# Patient Record
Sex: FEMALE | Race: WHITE | Employment: UNEMPLOYED | URBAN - METROPOLITAN AREA
[De-identification: names, ages, dates, MRNs, and addresses within clinical notes are randomized per-mention and may not be internally consistent; named-entity substitution may affect disease eponyms.]

---

## 2017-02-02 PROBLEM — F31.81 BIPOLAR 2 DISORDER (HCC): Status: ACTIVE | Noted: 2017-02-02

## 2017-02-02 PROBLEM — F11.23 OPIOID DEPENDENCE WITH WITHDRAWAL (HCC): Status: ACTIVE | Noted: 2017-02-02

## 2017-02-02 PROBLEM — F32.A DEPRESSION: Status: ACTIVE | Noted: 2017-02-02

## 2017-02-02 PROBLEM — F14.10 COCAINE ABUSE (HCC): Status: ACTIVE | Noted: 2017-02-02

## 2017-09-11 LAB — HIV AG/AB: NORMAL

## 2018-05-01 ENCOUNTER — OFFICE VISIT (OUTPATIENT)
Dept: FAMILY MEDICINE CLINIC | Age: 23
End: 2018-05-01

## 2018-05-01 VITALS
SYSTOLIC BLOOD PRESSURE: 116 MMHG | HEIGHT: 61 IN | DIASTOLIC BLOOD PRESSURE: 74 MMHG | HEART RATE: 85 BPM | WEIGHT: 201.8 LBS | OXYGEN SATURATION: 98 % | BODY MASS INDEX: 38.1 KG/M2 | RESPIRATION RATE: 14 BRPM

## 2018-05-01 DIAGNOSIS — M54.50 ACUTE MIDLINE LOW BACK PAIN WITHOUT SCIATICA: Primary | ICD-10-CM

## 2018-05-01 PROCEDURE — G8417 CALC BMI ABV UP PARAM F/U: HCPCS | Performed by: FAMILY MEDICINE

## 2018-05-01 PROCEDURE — 1036F TOBACCO NON-USER: CPT | Performed by: FAMILY MEDICINE

## 2018-05-01 PROCEDURE — G8427 DOCREV CUR MEDS BY ELIG CLIN: HCPCS | Performed by: FAMILY MEDICINE

## 2018-05-01 PROCEDURE — 99203 OFFICE O/P NEW LOW 30 MIN: CPT | Performed by: FAMILY MEDICINE

## 2018-05-01 RX ORDER — NORETHINDRONE ACETATE AND ETHINYL ESTRADIOL 1MG-20(21)
1 KIT ORAL
COMMUNITY
Start: 2018-03-26 | End: 2018-11-09

## 2018-05-01 RX ORDER — IBUPROFEN 800 MG/1
800 TABLET ORAL
COMMUNITY
Start: 2018-02-27 | End: 2021-07-19 | Stop reason: ALTCHOICE

## 2018-05-01 RX ORDER — ACYCLOVIR 400 MG/1
400 TABLET ORAL
COMMUNITY
Start: 2018-02-13

## 2018-05-01 RX ORDER — NAPROXEN 500 MG/1
500 TABLET ORAL 2 TIMES DAILY WITH MEALS
COMMUNITY
End: 2018-11-09

## 2018-05-01 ASSESSMENT — PATIENT HEALTH QUESTIONNAIRE - PHQ9
SUM OF ALL RESPONSES TO PHQ QUESTIONS 1-9: 0
SUM OF ALL RESPONSES TO PHQ9 QUESTIONS 1 & 2: 0
1. LITTLE INTEREST OR PLEASURE IN DOING THINGS: 0
2. FEELING DOWN, DEPRESSED OR HOPELESS: 0

## 2018-05-01 ASSESSMENT — ENCOUNTER SYMPTOMS
GASTROINTESTINAL NEGATIVE: 1
EYES NEGATIVE: 1
ALLERGIC/IMMUNOLOGIC NEGATIVE: 1
RESPIRATORY NEGATIVE: 1

## 2018-11-09 ENCOUNTER — OFFICE VISIT (OUTPATIENT)
Dept: FAMILY MEDICINE CLINIC | Age: 23
End: 2018-11-09
Payer: MEDICAID

## 2018-11-09 VITALS
WEIGHT: 205 LBS | HEART RATE: 85 BPM | BODY MASS INDEX: 39.38 KG/M2 | OXYGEN SATURATION: 98 % | RESPIRATION RATE: 14 BRPM | DIASTOLIC BLOOD PRESSURE: 84 MMHG | SYSTOLIC BLOOD PRESSURE: 110 MMHG

## 2018-11-09 DIAGNOSIS — R53.83 FATIGUE, UNSPECIFIED TYPE: Primary | ICD-10-CM

## 2018-11-09 DIAGNOSIS — Z23 NEED FOR INFLUENZA VACCINATION: ICD-10-CM

## 2018-11-09 DIAGNOSIS — R05.9 COUGH: ICD-10-CM

## 2018-11-09 PROCEDURE — 1036F TOBACCO NON-USER: CPT | Performed by: FAMILY MEDICINE

## 2018-11-09 PROCEDURE — 90686 IIV4 VACC NO PRSV 0.5 ML IM: CPT | Performed by: FAMILY MEDICINE

## 2018-11-09 PROCEDURE — G8417 CALC BMI ABV UP PARAM F/U: HCPCS | Performed by: FAMILY MEDICINE

## 2018-11-09 PROCEDURE — 90471 IMMUNIZATION ADMIN: CPT | Performed by: FAMILY MEDICINE

## 2018-11-09 PROCEDURE — G8482 FLU IMMUNIZE ORDER/ADMIN: HCPCS | Performed by: FAMILY MEDICINE

## 2018-11-09 PROCEDURE — G8427 DOCREV CUR MEDS BY ELIG CLIN: HCPCS | Performed by: FAMILY MEDICINE

## 2018-11-09 PROCEDURE — 99214 OFFICE O/P EST MOD 30 MIN: CPT | Performed by: FAMILY MEDICINE

## 2018-11-09 RX ORDER — ALBUTEROL SULFATE 90 UG/1
2 AEROSOL, METERED RESPIRATORY (INHALATION) EVERY 6 HOURS PRN
Qty: 1 INHALER | Refills: 0 | Status: SHIPPED | OUTPATIENT
Start: 2018-11-09 | End: 2021-03-29

## 2018-11-09 ASSESSMENT — ENCOUNTER SYMPTOMS
CHEST TIGHTNESS: 0
CHOKING: 0
EYES NEGATIVE: 1
APNEA: 0
SHORTNESS OF BREATH: 1
GASTROINTESTINAL NEGATIVE: 1
COUGH: 1

## 2018-11-09 NOTE — PROGRESS NOTES
SUBJECTIVE:  Patient ID: Vito Lopez is a 20 y.o. y.o. female     HPI   Fatigue: Patient complains of fatigue. Symptoms began several weeks ago. Sentinal symptom the patient feels fatigue began with: none. Symptoms of her fatigue have been diffuse soft tissue aches and pains and general malaise. Patient describes the following psychologic symptoms: none. Patient denies fever, significant change in weight, symptoms of arthritis and exercise intolerance. Symptoms have been intermittent. Severity has been mild. Previous visits for this problem: none. Cough: Patient complains of nonproductive cough. Symptoms began 1 month ago. The cough is non-productive, without wheezing, dyspnea or hemoptysis and is aggravated by exercise Associated symptoms include:shortness of breath. Patient does not have new pets. Patient does not have a history of asthma. Patient does not have a history of environmental allergens. Patient does not have recent travel. Patient does not have a history of smoking. Patient  does not have previous Chest X-ray. Patient does not have had a PPD done.   Concern about occasional easy bruising is not sure were why she had that in the past sometimes she remembers she had her arm or leg but sometimes she can't remember    Past Medical History:   Diagnosis Date    Anxiety     [facility]     Borderline personality disorder ([facility]     Depression     H/O drug abuse     hx of heroin/cocaine    IBS (irritable bowel syndrome)     Obese       Past Surgical History:   Procedure Laterality Date    WISDOM TOOTH EXTRACTION       Family History   Problem Relation Age of Onset    Other Maternal Grandmother         demetia    Heart Disease Maternal Grandmother     High Cholesterol Maternal Grandfather     Diabetes Maternal Grandfather     High Blood Pressure Maternal Grandfather     Heart Disease Maternal Grandfather      Social History     Social History    Marital status:

## 2018-11-09 NOTE — PROGRESS NOTES
Vaccine Information Sheet, \"Influenza - Inactivated\"  given to Dami Lancaster, or parent/legal guardian of  Dmai Lancaster and verbalized understanding. Patient responses:    Have you ever had a reaction to a flu vaccine? No  Are you able to eat eggs without adverse effects? Yes  Do you have any current illness? No  Have you ever had Guillian Chestnut Syndrome? No    Flu vaccine given per order. Please see immunization tab.

## 2018-11-09 NOTE — PATIENT INSTRUCTIONS
energy. You may feel fatigue because of too much or not enough activity. It can also come from stress, lack of sleep, boredom, and poor diet. Many medical problems, such as viral infections, can cause fatigue. Emotional problems, especially depression, are often the cause of fatigue. Fatigue is most often a symptom of another problem. Treatment for fatigue depends on the cause. For example, if you have fatigue because you have a certain health problem, treating this problem also treats your fatigue. If depression or anxiety is the cause, treatment may help. Follow-up care is a key part of your treatment and safety. Be sure to make and go to all appointments, and call your doctor if you are having problems. It's also a good idea to know your test results and keep a list of the medicines you take. How can you care for yourself at home? · Get regular exercise. But don't overdo it. Go back and forth between rest and exercise. · Get plenty of rest.  · Eat a healthy diet. Do not skip meals, especially breakfast.  · Reduce your use of caffeine, tobacco, and alcohol. Caffeine is most often found in coffee, tea, cola drinks, and chocolate. · Limit medicines that can cause fatigue. This includes tranquilizers and cold and allergy medicines. When should you call for help? Watch closely for changes in your health, and be sure to contact your doctor if:    · You have new symptoms such as fever or a rash.     · Your fatigue gets worse.     · You have been feeling down, depressed, or hopeless. Or you may have lost interest in things that you usually enjoy.     · You are not getting better as expected. Where can you learn more? Go to [email or web address] and sign in to your "[employer] account. Enter Q326 in the [employer] box to learn more about \"Fatigue: Care Instructions. \"     If you do not have an account, please click on the \"Sign Up Now\" link.   Current as of: November 20, 2017  Content

## 2018-11-16 DIAGNOSIS — R53.83 FATIGUE, UNSPECIFIED TYPE: ICD-10-CM

## 2018-11-16 LAB
HCT VFR BLD CALC: 38.9 % (ref 36–48)
HEMOGLOBIN: 12.7 G/DL (ref 12–16)
MCH RBC QN AUTO: 28.9 PG (ref 26–34)
MCHC RBC AUTO-ENTMCNC: 32.6 G/DL (ref 31–36)
MCV RBC AUTO: 88.7 FL (ref 80–100)
PDW BLD-RTO: 14.3 % (ref 12.4–15.4)
PLATELET # BLD: 332 K/UL (ref 135–450)
PMV BLD AUTO: 9.4 FL (ref 5–10.5)
RBC # BLD: 4.39 M/UL (ref 4–5.2)
WBC # BLD: 8.1 K/UL (ref 4–11)

## 2018-11-17 LAB
ALBUMIN SERPL-MCNC: 4.7 G/DL (ref 3.4–5)
ALP BLD-CCNC: 103 U/L (ref 40–129)
ALT SERPL-CCNC: 30 U/L (ref 10–40)
ANION GAP SERPL CALCULATED.3IONS-SCNC: 14 MMOL/L (ref 3–16)
AST SERPL-CCNC: 21 U/L (ref 15–37)
BILIRUB SERPL-MCNC: 0.4 MG/DL (ref 0–1)
BILIRUBIN DIRECT: <0.2 MG/DL (ref 0–0.3)
BILIRUBIN, INDIRECT: NORMAL MG/DL (ref 0–1)
BUN BLDV-MCNC: 12 MG/DL (ref 7–20)
CALCIUM SERPL-MCNC: 9.7 MG/DL (ref 8.3–10.6)
CHLORIDE BLD-SCNC: 101 MMOL/L (ref 99–110)
CHOLESTEROL, TOTAL: 164 MG/DL (ref 0–199)
CO2: 23 MMOL/L (ref 21–32)
CREAT SERPL-MCNC: 0.5 MG/DL (ref 0.6–1.1)
FOLATE: 13.96 NG/ML (ref 4.78–24.2)
GFR AFRICAN AMERICAN: >60
GFR NON-AFRICAN AMERICAN: >60
GLUCOSE BLD-MCNC: 90 MG/DL (ref 70–99)
HDLC SERPL-MCNC: 52 MG/DL (ref 40–60)
LDL CHOLESTEROL CALCULATED: 99 MG/DL
POTASSIUM SERPL-SCNC: 4.3 MMOL/L (ref 3.5–5.1)
SODIUM BLD-SCNC: 138 MMOL/L (ref 136–145)
TOTAL PROTEIN: 8.1 G/DL (ref 6.4–8.2)
TRIGL SERPL-MCNC: 63 MG/DL (ref 0–150)
TSH SERPL DL<=0.05 MIU/L-ACNC: 2.13 UIU/ML (ref 0.27–4.2)
VITAMIN B-12: 400 PG/ML (ref 211–911)
VITAMIN D 25-HYDROXY: 29.6 NG/ML
VLDLC SERPL CALC-MCNC: 13 MG/DL

## 2018-11-30 ENCOUNTER — OFFICE VISIT (OUTPATIENT)
Dept: FAMILY MEDICINE CLINIC | Age: 23
End: 2018-11-30
Payer: MEDICAID

## 2018-11-30 VITALS
WEIGHT: 201.2 LBS | DIASTOLIC BLOOD PRESSURE: 78 MMHG | RESPIRATION RATE: 14 BRPM | HEART RATE: 91 BPM | OXYGEN SATURATION: 97 % | BODY MASS INDEX: 38.65 KG/M2 | SYSTOLIC BLOOD PRESSURE: 132 MMHG

## 2018-11-30 DIAGNOSIS — R53.83 FATIGUE, UNSPECIFIED TYPE: Primary | ICD-10-CM

## 2018-11-30 PROCEDURE — G8427 DOCREV CUR MEDS BY ELIG CLIN: HCPCS | Performed by: FAMILY MEDICINE

## 2018-11-30 PROCEDURE — G8417 CALC BMI ABV UP PARAM F/U: HCPCS | Performed by: FAMILY MEDICINE

## 2018-11-30 PROCEDURE — 99213 OFFICE O/P EST LOW 20 MIN: CPT | Performed by: FAMILY MEDICINE

## 2018-11-30 PROCEDURE — G8482 FLU IMMUNIZE ORDER/ADMIN: HCPCS | Performed by: FAMILY MEDICINE

## 2018-11-30 PROCEDURE — 1036F TOBACCO NON-USER: CPT | Performed by: FAMILY MEDICINE

## 2018-11-30 ASSESSMENT — ENCOUNTER SYMPTOMS
EYES NEGATIVE: 1
RESPIRATORY NEGATIVE: 1
GASTROINTESTINAL NEGATIVE: 1

## 2020-06-19 ENCOUNTER — VIRTUAL VISIT (OUTPATIENT)
Dept: PRIMARY CARE CLINIC | Age: 25
End: 2020-06-19
Payer: COMMERCIAL

## 2020-06-19 PROCEDURE — 99213 OFFICE O/P EST LOW 20 MIN: CPT | Performed by: FAMILY MEDICINE

## 2020-06-19 RX ORDER — MOMETASONE FUROATE 1 MG/G
CREAM TOPICAL
Qty: 50 G | Refills: 0 | Status: SHIPPED | OUTPATIENT
Start: 2020-06-19 | End: 2021-03-29

## 2020-06-19 SDOH — ECONOMIC STABILITY: TRANSPORTATION INSECURITY
IN THE PAST 12 MONTHS, HAS LACK OF TRANSPORTATION KEPT YOU FROM MEETINGS, WORK, OR FROM GETTING THINGS NEEDED FOR DAILY LIVING?: NO

## 2020-06-19 SDOH — ECONOMIC STABILITY: FOOD INSECURITY: WITHIN THE PAST 12 MONTHS, YOU WORRIED THAT YOUR FOOD WOULD RUN OUT BEFORE YOU GOT MONEY TO BUY MORE.: NEVER TRUE

## 2020-06-19 SDOH — ECONOMIC STABILITY: FOOD INSECURITY: WITHIN THE PAST 12 MONTHS, THE FOOD YOU BOUGHT JUST DIDN'T LAST AND YOU DIDN'T HAVE MONEY TO GET MORE.: NEVER TRUE

## 2020-06-19 SDOH — ECONOMIC STABILITY: TRANSPORTATION INSECURITY
IN THE PAST 12 MONTHS, HAS THE LACK OF TRANSPORTATION KEPT YOU FROM MEDICAL APPOINTMENTS OR FROM GETTING MEDICATIONS?: NO

## 2020-06-19 SDOH — ECONOMIC STABILITY: INCOME INSECURITY: HOW HARD IS IT FOR YOU TO PAY FOR THE VERY BASICS LIKE FOOD, HOUSING, MEDICAL CARE, AND HEATING?: NOT HARD AT ALL

## 2020-06-19 ASSESSMENT — ENCOUNTER SYMPTOMS
RESPIRATORY NEGATIVE: 1
EYES NEGATIVE: 1
GASTROINTESTINAL NEGATIVE: 1

## 2020-06-19 NOTE — PROGRESS NOTES
SUBJECTIVE:  Patient ID: Candelaria Patton is a 30 y.o. y.o. female     HPI   Patient is here with a concern about possible abscess under her left arm she felt it yesterday little sore she feels the whole side of her is a little bit not right  No fever no chills  She does shave in her armpit  She was drinking some kind of beer and she felt flushed she took Benadryl that took care of it she feels better  No prior history of abscess  She just gave birth to twin boys  Past Medical History:   Diagnosis Date    Anxiety     [facility] disorder ([facility]     Borderline personality disorder ([facility]     Depression     H/O drug abuse ([facility]     hx of heroin/cocaine    IBS (irritable bowel syndrome)     Obese       Past Surgical History:   Procedure Laterality Date    WISDOM TOOTH EXTRACTION       Family History   Problem Relation Age of Onset    Other Maternal Grandmother         demetia    Heart Disease Maternal Grandmother     High Cholesterol Maternal Grandfather     Diabetes Maternal Grandfather     High Blood Pressure Maternal Grandfather     Heart Disease Maternal Grandfather      Social History     Socioeconomic History    Marital status:      Spouse name: None    Number of children: None    Years of education: None    Highest education level: None   Occupational History     Comment: Refused to answer   Social Needs    Financial resource strain: Not hard at all   [place] insecurity     Worry: Never true     Inability: Never true    Transportation needs     Medical: No     Non-medical: No   Tobacco Use    Smoking status: Never Smoker    Smokeless tobacco: Never Used   Substance and Sexual Activity    Alcohol use: Yes     Comment: socially    Drug use: Yes     Types: Cocaine, IV     Comment: heroin-snort    Sexual activity: Yes     Partners: Male   Lifestyle    Physical activity     Days per week: None     Minutes per session: None    Stress: None   Relationships    Social connections     Talks on normal.      Breath sounds: Normal breath sounds. No wheezing or rales. Abdominal:      General: Bowel sounds are normal. There is no distension. Palpations: Abdomen is soft. There is no mass. Tenderness: There is no abdominal tenderness. Hernia: No hernia is present. Lymphadenopathy:      Cervical: No cervical adenopathy. Skin:     Findings: No rash. Neurological:      Mental Status: She is alert and oriented to person, place, and time. ASSESSMENT:     Diagnosis Orders   1.  Bug bite, sequela  mometasone (ELOCON) 0.1 % cream       PLAN:  See orders  Discussed skin care  Any worse changes to let me know assured the patient there is no abscess

## 2020-09-24 ENCOUNTER — OFFICE VISIT (OUTPATIENT)
Dept: PRIMARY CARE CLINIC | Age: 25
End: 2020-09-24
Payer: COMMERCIAL

## 2020-09-24 VITALS
TEMPERATURE: 98 F | HEART RATE: 89 BPM | BODY MASS INDEX: 35.84 KG/M2 | OXYGEN SATURATION: 99 % | SYSTOLIC BLOOD PRESSURE: 122 MMHG | WEIGHT: 186.6 LBS | RESPIRATION RATE: 14 BRPM | DIASTOLIC BLOOD PRESSURE: 78 MMHG

## 2020-09-24 PROCEDURE — G8417 CALC BMI ABV UP PARAM F/U: HCPCS | Performed by: FAMILY MEDICINE

## 2020-09-24 PROCEDURE — G8427 DOCREV CUR MEDS BY ELIG CLIN: HCPCS | Performed by: FAMILY MEDICINE

## 2020-09-24 PROCEDURE — 99212 OFFICE O/P EST SF 10 MIN: CPT | Performed by: FAMILY MEDICINE

## 2020-09-24 PROCEDURE — 1036F TOBACCO NON-USER: CPT | Performed by: FAMILY MEDICINE

## 2020-09-24 RX ORDER — LANOLIN ALCOHOL/MO/W.PET/CERES
325 CREAM (GRAM) TOPICAL
COMMUNITY
End: 2021-04-12

## 2020-09-24 NOTE — PROGRESS NOTES
SUBJECTIVE:  Patient ID: Sara Cadet is a 30 y.o. y.o. female     HPI   Pt with concern  About a  Mole on her back,  She is concerned because a family history of skin cancer  She wants to see dermatology  Past Medical History:   Diagnosis Date    Anxiety     B[facility] ([facility]     Borderline personality disorder ([facility]     Depression     H/O drug abuse ([facility]     hx of heroin/cocaine    IBS (irritable bowel syndrome)     Obese       Past Surgical History:   Procedure Laterality Date    WISDOM TOOTH EXTRACTION       Family History   Problem Relation Age of Onset    Other Maternal Grandmother         demetia    Heart Disease Maternal Grandmother     High Cholesterol Maternal Grandfather     Diabetes Maternal Grandfather     High Blood Pressure Maternal Grandfather     Heart Disease Maternal Grandfather      Social History     Socioeconomic History    Marital status: Life Partner     Spouse name: None    Number of children: None    Years of education: None    Highest education level: None   Occupational History     Comment: Refused to answer   Social Needs    Financial resource strain: Not hard at all   [place] insecurity     Worry: Never true     Inability: Never true    Transportation needs     Medical: No     Non-medical: No   Tobacco Use    Smoking status: Never Smoker    Smokeless tobacco: Never Used   Substance and Sexual Activity    Alcohol use: Yes     Comment: socially    Drug use: Yes     Types: Cocaine, IV     Comment: heroin-snort    Sexual activity: Yes     Partners: Male   Lifestyle    Physical activity     Days per week: None     Minutes per session: None    Stress: None   Relationships    Social connections     Talks on phone: None     Gets together: None     Attends Sikhism service: None     Active member of club or organization: None     Attends meetings of clubs or organizations: None     Relationship status: None    Intimate partner violence     Fear of current or ex partner: None     Emotionally abused: None     Physically abused: None     Forced sexual activity: None   Other Topics Concern    None   Social History Narrative    None     Current Outpatient Medications   Medication Sig Dispense Refill    ferrous sulfate (FE TABS 325) 325 (65 Fe) MG EC tablet Take 325 mg by mouth daily (with breakfast)      metFORMIN (GLUCOPHAGE) 500 MG tablet Take 500 mg by mouth daily (with breakfast)      acyclovir (ZOVIRAX) 400 MG tablet Take 400 mg by mouth      ibuprofen (ADVIL;MOTRIN) 800 MG tablet Take 800 mg by mouth      mometasone (ELOCON) 0.1 % cream Apply topically daily. 50 g 0    albuterol sulfate HFA (PROAIR HFA) 108 (90 Base) MCG/ACT inhaler Inhale 2 puffs into the lungs every 6 hours as needed for Wheezing (Patient not taking: Reported on 6/19/2020) 1 Inhaler 0    PRENATAL MULTIVIT-MIN-FE-FA PO Take by mouth       No current facility-administered medications for this visit. Allergies   Allergen Reactions    Nickel Rash     Skin began to excoriate    Lexapro [Escitalopram Oxalate]      Jittery and could not sleep       Review of Systems   Constitutional: Negative. HENT: Negative. Eyes: Negative. Respiratory: Negative. Cardiovascular: Negative. Gastrointestinal: Negative. All other systems reviewed and are negative. OBJECTIVE:  /78 (Site: Left Upper Arm, Position: Sitting, Cuff Size: Large Adult)   Pulse 89   Temp 98 °F (36.7 °C) (Temporal)   Resp 14   Wt 186 lb 9.6 oz (84.6 kg)   LMP 09/20/2020 (Approximate)   SpO2 99%   Breastfeeding No   BMI 35.84 kg/m²     Physical Exam  Vitals signs reviewed. Skin:               ASSESSMENT:     Diagnosis Orders   1.  Skin lesion  Ambulatory referral to Dermatology       PLAN:  See orders  Discussed skin care  Avoid sun/ and the  use of sunscreen

## 2020-09-28 ASSESSMENT — ENCOUNTER SYMPTOMS
GASTROINTESTINAL NEGATIVE: 1
RESPIRATORY NEGATIVE: 1
EYES NEGATIVE: 1

## 2020-12-01 ENCOUNTER — OFFICE VISIT (OUTPATIENT)
Dept: PRIMARY CARE CLINIC | Age: 25
End: 2020-12-01
Payer: COMMERCIAL

## 2020-12-01 VITALS
DIASTOLIC BLOOD PRESSURE: 76 MMHG | BODY MASS INDEX: 32.89 KG/M2 | SYSTOLIC BLOOD PRESSURE: 128 MMHG | WEIGHT: 185.6 LBS | RESPIRATION RATE: 12 BRPM | HEART RATE: 75 BPM | HEIGHT: 63 IN | OXYGEN SATURATION: 98 % | TEMPERATURE: 97.3 F

## 2020-12-01 PROCEDURE — 1036F TOBACCO NON-USER: CPT | Performed by: FAMILY MEDICINE

## 2020-12-01 PROCEDURE — G8417 CALC BMI ABV UP PARAM F/U: HCPCS | Performed by: FAMILY MEDICINE

## 2020-12-01 PROCEDURE — 99213 OFFICE O/P EST LOW 20 MIN: CPT | Performed by: FAMILY MEDICINE

## 2020-12-01 PROCEDURE — G8482 FLU IMMUNIZE ORDER/ADMIN: HCPCS | Performed by: FAMILY MEDICINE

## 2020-12-01 PROCEDURE — G8427 DOCREV CUR MEDS BY ELIG CLIN: HCPCS | Performed by: FAMILY MEDICINE

## 2020-12-01 RX ORDER — UBIDECARENONE 75 MG
50 CAPSULE ORAL DAILY
COMMUNITY
End: 2021-04-12

## 2020-12-01 RX ORDER — FLUTICASONE PROPIONATE 50 MCG
2 SPRAY, SUSPENSION (ML) NASAL DAILY
Qty: 1 BOTTLE | Refills: 1 | Status: SHIPPED | OUTPATIENT
Start: 2020-12-01 | End: 2021-04-20

## 2020-12-01 ASSESSMENT — ENCOUNTER SYMPTOMS
RESPIRATORY NEGATIVE: 1
GASTROINTESTINAL NEGATIVE: 1
EYES NEGATIVE: 1

## 2020-12-01 NOTE — PROGRESS NOTES
SUBJECTIVE:  Patient ID: Mora Borja is a 30 y.o. y.o. female     HPI   Pt with concern about left ear pain/pressure feels vibrating , it did affect her hearing in someway,  Weeks ago she had an upper respiratory infection and the symptoms started gradually  No fever  No Sore throat no cough  Past Medical History:   Diagnosis Date    Anxiety     Bipolar 2 disorder ([facility]     Borderline personality disorder ([facility]     Depression     H/O drug abuse ([facility]     hx of heroin/cocaine    IBS (irritable bowel syndrome)     Obese       Past Surgical History:   Procedure Laterality Date    WISDOM TOOTH EXTRACTION       Family History   Problem Relation Age of Onset    Other Maternal Grandmother         demetia    Heart Disease Maternal Grandmother     High Cholesterol Maternal Grandfather     Diabetes Maternal Grandfather     High Blood Pressure Maternal Grandfather     Heart Disease Maternal Grandfather      Social History     Socioeconomic History    Marital status: Life Partner     Spouse name: None    Number of children: None    Years of education: None    Highest education level: None   Occupational History     Comment: Refused to answer   Social Needs    Financial resource strain: Not hard at all   [employer] insecurity     Worry: Never true     Inability: Never true    Transportation needs     Medical: No     Non-medical: No   Tobacco Use    Smoking status: Never Smoker    Smokeless tobacco: Never Used   Substance and Sexual Activity    Alcohol use: Yes     Comment: socially    Drug use: Yes     Types: Cocaine, IV     Comment: heroin-snort    Sexual activity: Yes     Partners: Male   Lifestyle    Physical activity     Days per week: None     Minutes per session: None    Stress: None   Relationships    Social connections     Talks on phone: None     Gets together: None     Attends Yarsanism service: None     Active member of club or organization: None     Attends meetings of clubs or organizations: None     Relationship status: None    Intimate partner violence     Fear of current or ex partner: None     Emotionally abused: None     Physically abused: None     Forced sexual activity: None   Other Topics Concern    None   Social History Narrative    None     Current Outpatient Medications   Medication Sig Dispense Refill    vitamin B-12 (CYANOCOBALAMIN) 100 MCG tablet Take 50 mcg by mouth daily      ferrous sulfate (FE TABS 325) 325 (65 Fe) MG EC tablet Take 325 mg by mouth daily (with breakfast)      metFORMIN (GLUCOPHAGE) 500 MG tablet Take 500 mg by mouth 2 times daily (with meals)       acyclovir (ZOVIRAX) 400 MG tablet Take 400 mg by mouth      ibuprofen (ADVIL;MOTRIN) 800 MG tablet Take 800 mg by mouth      mometasone (ELOCON) 0.1 % cream Apply topically daily. (Patient not taking: Reported on 12/1/2020) 50 g 0    albuterol sulfate HFA (PROAIR HFA) 108 (90 Base) MCG/ACT inhaler Inhale 2 puffs into the lungs every 6 hours as needed for Wheezing (Patient not taking: Reported on 12/1/2020) 1 Inhaler 0    PRENATAL MULTIVIT-MIN-FE-FA PO Take by mouth       No current facility-administered medications for this visit. Allergies   Allergen Reactions    Nickel Rash     Skin began to excoriate    Lexapro [Escitalopram Oxalate]      Jittery and could not sleep       Review of Systems   Constitutional: Negative. HENT: Positive for ear pain, postnasal drip and tinnitus. Eyes: Negative. Respiratory: Negative. Cardiovascular: Negative. Gastrointestinal: Negative. All other systems reviewed and are negative. OBJECTIVE:  /76 (Site: Left Upper Arm, Position: Sitting, Cuff Size: Small Adult)   Pulse 75   Temp 97.3 °F (36.3 °C) (Temporal)   Resp 12   Ht 5' 3.25\" (1.607 m)   Wt 185 lb 9.6 oz (84.2 kg)   LMP 10/28/2020   SpO2 98%   Breastfeeding No   BMI 32.62 kg/m²     Physical Exam  Vitals signs reviewed. HENT:      Head: Normocephalic.       Right Ear:

## 2021-02-23 ENCOUNTER — OFFICE VISIT (OUTPATIENT)
Dept: PRIMARY CARE CLINIC | Age: 26
End: 2021-02-23
Payer: COMMERCIAL

## 2021-02-23 VITALS
DIASTOLIC BLOOD PRESSURE: 70 MMHG | SYSTOLIC BLOOD PRESSURE: 130 MMHG | RESPIRATION RATE: 12 BRPM | WEIGHT: 187 LBS | OXYGEN SATURATION: 98 % | BODY MASS INDEX: 32.86 KG/M2 | HEART RATE: 76 BPM | TEMPERATURE: 98.2 F

## 2021-02-23 DIAGNOSIS — G89.29 CHRONIC LEFT SHOULDER PAIN: Primary | ICD-10-CM

## 2021-02-23 DIAGNOSIS — M25.512 CHRONIC LEFT SHOULDER PAIN: Primary | ICD-10-CM

## 2021-02-23 DIAGNOSIS — R51.9 ACUTE INTRACTABLE HEADACHE, UNSPECIFIED HEADACHE TYPE: ICD-10-CM

## 2021-02-23 LAB
HCT VFR BLD CALC: 39.8 % (ref 36–48)
HEMOGLOBIN: 13.2 G/DL (ref 12–16)
MCH RBC QN AUTO: 29.8 PG (ref 26–34)
MCHC RBC AUTO-ENTMCNC: 33.1 G/DL (ref 31–36)
MCV RBC AUTO: 89.9 FL (ref 80–100)
PDW BLD-RTO: 13.6 % (ref 12.4–15.4)
PLATELET # BLD: 334 K/UL (ref 135–450)
PMV BLD AUTO: 9.2 FL (ref 5–10.5)
RBC # BLD: 4.42 M/UL (ref 4–5.2)
SEDIMENTATION RATE, ERYTHROCYTE: 14 MM/HR (ref 0–20)
WBC # BLD: 7.7 K/UL (ref 4–11)

## 2021-02-23 PROCEDURE — G8417 CALC BMI ABV UP PARAM F/U: HCPCS | Performed by: FAMILY MEDICINE

## 2021-02-23 PROCEDURE — 99214 OFFICE O/P EST MOD 30 MIN: CPT | Performed by: FAMILY MEDICINE

## 2021-02-23 PROCEDURE — 1036F TOBACCO NON-USER: CPT | Performed by: FAMILY MEDICINE

## 2021-02-23 PROCEDURE — G8427 DOCREV CUR MEDS BY ELIG CLIN: HCPCS | Performed by: FAMILY MEDICINE

## 2021-02-23 PROCEDURE — G8482 FLU IMMUNIZE ORDER/ADMIN: HCPCS | Performed by: FAMILY MEDICINE

## 2021-02-23 RX ORDER — RIZATRIPTAN BENZOATE 10 MG/1
10 TABLET ORAL
Qty: 12 TABLET | Refills: 2 | Status: SHIPPED | OUTPATIENT
Start: 2021-02-23 | End: 2021-03-05 | Stop reason: SDUPTHER

## 2021-02-23 RX ORDER — CYCLOBENZAPRINE HCL 5 MG
5 TABLET ORAL NIGHTLY PRN
Qty: 30 TABLET | Refills: 0 | Status: SHIPPED | OUTPATIENT
Start: 2021-02-23 | End: 2021-06-01

## 2021-02-23 NOTE — PROGRESS NOTES
SUBJECTIVE:  Patient ID: Erasmo Peraza is a 27 y.o. y.o. female     HPI   Shoulder Pain: Patient complaints of left shoulder pain. This is evaluated as a personal injury. The pain is described as aching and throbbing. The onset of the pain was gradual, starting about several months ago. The pain occurs every day and lasts 4 hours. Location is lateral, neck. No history of dislocation. Symptoms are aggravated by reaching, repetitive use. Symptoms are diminished by  rest.   Limited activities include: no limitations. mild stiffness, no weakness, no swelling, no crepitus noted is reported. 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 Headache: Patient presents with headache. Symptoms began about several months ago. Generally, the headaches last about a few hours and occur frequent, daily. The headache do not seem to be related to any time of the day. The headaches are usually sharp and throbbing and are right-sided unilateral, left-sided unilateral in location. The patient rates her most severe headaches a 8 on a scale from 1 to 10. Recently, the headaches are increasing in frequency. School attendance or other daily activities are affected by the headaches. Precipitating factors include none which have been determined. The headaches are usually not preceded by an aura. Associated neurologic symptoms which are present include: decreased physical activity. The patient denies depression, loss of balance, muscle weakness, numbness of extremities, vomiting in the early morning and worsening school/work performance. Other associated symptoms include: nothing pertinent. Symptoms which are not present include: conjunctivitis, diarrhea, ear pulling, fever, hoarseness, nasal congestion, nausea, photophobia and rash. Home treatment has included acetaminophen and ibuprofen with fair improvement.  Other history includes: headaches of unknown type diagnosed in the past. Family history includes no known family members with significant headaches.         Past Medical History:   Diagnosis Date    Anxiety     [facility]     [facility]     Depression     H/O drug abuse ([facility]     hx of heroin/cocaine    IBS (irritable bowel syndrome)     Obese       Past Surgical History:   Procedure Laterality Date    WISDOM TOOTH EXTRACTION       Family History   Problem Relation Age of Onset    Other Maternal Grandmother         demetia    Heart Disease Maternal Grandmother     High Cholesterol Maternal Grandfather     Diabetes Maternal Grandfather     High Blood Pressure Maternal Grandfather     Heart Disease Maternal Grandfather      Social History     Socioeconomic History    Marital status: Life Partner     Spouse name: None    Number of children: None    Years of education: None    Highest education level: None   Occupational History     Comment: Refused to answer   Social Needs    Financial resource strain: Not hard at all   [place]-[employer]iel[employer] insecurity     Worry: Never true     Inability: Never true    Transportation needs     Medical: No     Non-medical: No   Tobacco Use    Smoking status: Never Smoker    Smokeless tobacco: Never Used   Substance and Sexual Activity    Alcohol use: Yes     Comment: socially    Drug use: Yes     Types: Cocaine, IV     Comment: heroin-snort    Sexual activity: Yes     Partners: Male   Lifestyle    Physical activity     Days per week: None     Minutes per session: None    Stress: None   Relationships    Social connections     Talks on phone: None     Gets together: None     Attends [employer] service: None     Active member of club or organization: None     Attends meetings of clubs or organizations: None     Relationship status: None    Intimate partner violence     Fear of current or ex partner: None     Emotionally abused: None     Physically abused: None     Forced sexual activity: None   Other Topics Concern    None   Social History Narrative    None     Current Outpatient Medications   Medication Sig Dispense Refill    vitamin B-12 (CYANOCOBALAMIN) 100 MCG tablet Take 50 mcg by mouth daily      fluticasone (FLONASE) 50 MCG/ACT nasal spray 2 sprays by Each Nostril route daily 1 Bottle 1    ferrous sulfate (FE TABS 325) 325 (65 Fe) MG EC tablet Take 325 mg by mouth daily (with breakfast)      metFORMIN (GLUCOPHAGE) 500 MG tablet Take 500 mg by mouth 2 times daily (with meals)       acyclovir (ZOVIRAX) 400 MG tablet Take 400 mg by mouth      ibuprofen (ADVIL;MOTRIN) 800 MG tablet Take 800 mg by mouth      mometasone (ELOCON) 0.1 % cream Apply topically daily. (Patient not taking: Reported on 12/1/2020) 50 g 0    albuterol sulfate HFA (PROAIR HFA) 108 (90 Base) MCG/ACT inhaler Inhale 2 puffs into the lungs every 6 hours as needed for Wheezing (Patient not taking: Reported on 12/1/2020) 1 Inhaler 0     No current facility-administered medications for this visit. Allergies   Allergen Reactions    Nickel Rash     Skin began to excoriate    Lexapro [Escitalopram Oxalate]      Jittery and could not sleep       Review of Systems   Constitutional: Negative. HENT: Negative. Eyes: Negative. Respiratory: Negative. Cardiovascular: Negative. Gastrointestinal: Negative. Neurological: Positive for headaches. Negative for dizziness, tremors, seizures, syncope, facial asymmetry, speech difficulty, weakness, light-headedness and numbness. All other systems reviewed and are negative. OBJECTIVE:  /70 (Site: Left Upper Arm, Position: Sitting, Cuff Size: Large Adult)   Pulse 76   Temp 98.2 °F (36.8 °C)   Resp 12   Wt 187 lb (84.8 kg)   LMP 02/10/2021 (Exact Date)   SpO2 98%   Breastfeeding No   BMI 32.86 kg/m²     Physical Exam  Vitals signs reviewed. Constitutional:       Appearance: Normal appearance. HENT:      Head: Normocephalic and atraumatic. Eyes:      General: No scleral icterus.      Conjunctiva/sclera: Conjunctivae normal.   Neck:      Thyroid: No thyromegaly. Vascular: No JVD. Cardiovascular:      Rate and Rhythm: Normal rate and regular rhythm. Heart sounds: Normal heart sounds. No murmur. No friction rub. No gallop. Pulmonary:      Effort: Pulmonary effort is normal.      Breath sounds: Normal breath sounds. No wheezing or rales. Abdominal:      General: Bowel sounds are normal. There is no distension. Palpations: Abdomen is soft. There is no mass. Tenderness: There is no abdominal tenderness. Hernia: No hernia is present. Musculoskeletal:      Left shoulder: She exhibits decreased range of motion, tenderness and pain. She exhibits no bony tenderness, no swelling, no effusion, no crepitus, no deformity, no laceration, no spasm, normal pulse and normal strength. Lymphadenopathy:      Cervical: No cervical adenopathy. Skin:     Findings: No rash. Neurological:      Mental Status: She is alert and oriented to person, place, and time. ASSESSMENT:   Diagnosis Orders   1. Chronic left shoulder pain  cyclobenzaprine (FLEXERIL) 5 MG tablet   2.  Acute intractable headache, unspecified headache type  rizatriptan (MAXALT) 10 MG tablet    CBC    Basic Metabolic Panel    TSH without Reflex    Vitamin B12 & Folate    Hepatic Function Panel    Sedimentation Rate    C-Reactive Protein       PLAN:  See orders  Discussed may be there is a connection between her headache and left shoulder pain continue to monitor  Exercise sheet is given to patient  Close follow-up  Keep a diary of headaches

## 2021-02-23 NOTE — PATIENT INSTRUCTIONS
Patient Education        Shoulder Blade: Exercises  Introduction  Here are some examples of exercises for you to try. The exercises may be suggested for a condition or for rehabilitation. Start each exercise slowly. Ease off the exercises if you start to have pain. You will be told when to start these exercises and which ones will work best for you. How to do the exercises  Shoulder roll   1. Stand tall with your chin slightly tucked. Imagine that a string at the top of your head is pulling you straight up. 2. Keep your arms relaxed. All motion will be in your shoulders. 3. Shrug your shoulders up toward your ears, then up and back. [place] your shoulders down and back, like you're sliding your hands down into your back pants pockets. 4. Repeat the circles at least 2 to 4 times. 5. This exercise is also helpful anytime you want to relax. Lower neck and upper back stretch   1. With your arms about shoulder height, clasp your hands in front of you. 2. Drop your chin toward your chest.  3. Reach straight forward so you are rounding your upper back. Think about pulling your shoulder blades apart. Zabrina Kc feel a stretch across your upper back and shoulders. Hold for at least 6 seconds. 4. Repeat 2 to 4 times. Triceps stretch   1. Reach your arm straight up. 2. Keeping your elbow in place, bend your arm and reach your hand down behind your back. 3. With your other hand, apply gentle pressure to the bent elbow. Zabrina Kc feel a stretch at the back of your upper arm and shoulder. Hold about 6 seconds. 4. Repeat 2 to 4 times with each arm. Shoulder stretch   1. Relax your shoulders. 2. Raise one arm to shoulder height, and reach it across your chest.  3. Pull the arm slightly toward you with your other arm. This will help you get a gentle stretch. Hold for about 6 seconds. 4. Repeat 2 to 4 times. Shoulder blade squeeze   1. Sit or stand up tall with your arms at your sides. 2. Keep your shoulders relaxed and down, not shrugged. 3. Squeeze your shoulder blades together. Hold for 6 seconds, then relax. 4. Repeat 8 to 12 times. Straight-arm shoulder blade squeeze   1. Sit or stand tall. Relax your shoulders. 2. With palms down, hold your elastic tubing or band straight out in front of you. 3. Start with slight tension in the tubing or band, with your hands about shoulder-width apart. 4. Slowly pull straight out to the sides, squeezing your shoulder blades together. Keep your arms straight and at shoulder height. Slowly release. 5. Repeat 8 to 12 times. Rowing   1. [place] your elastic tubing or band at about waist height. Take one end in each hand. 2. Sit or stand with your feet hip-width apart. 3. Hold your arms straight in front of you. Adjust your distance to create slight tension in the tubing or band. 4. Slightly tuck your chin. Relax your shoulders. 5. Without shrugging your shoulders, pull straight back. Your elbows will pass alongside your waist.    Pull-downs   1. [place] your elastic tubing or band in the top of a closed door. Take one end in each hand. 2. Either sit or stand, depending on what is more comfortable. If you feel unsteady, sit on a chair. 3. Start with your arms up and comfortably apart, elbows straight. There should be a slight tension in the tubing or band. 4. Slightly tuck your chin, and look straight ahead. 5. Keeping your back straight, slowly pull down and back, bending your elbows. 6. Stop where your hands are level with your chin, in a \"goalpost\" position. 7. Repeat 8 to 12 times. Chest T stretch   1. Lie on your back. Raise your knees so they are bent. Plant your feet on the floor, hip-width apart. 2. Tuck your chin, and relax your shoulders. 3. Reach your arms straight out to the sides. If you don't feel a mild stretch in your shoulders and across your chest, use a foam roll or a tightly rolled blanket under your spine, from your tailbone to your head. 4. Relax in this position for at least 15 to 30 seconds while you breathe normally. Repeat 2 to 4 times. 5. As you get used to this stretch, keep adding a little more time until you are able relax in this position for 2 or 3 minutes. When you can relax for at least 2 minutes, you only need to do the exercise 1 time per session. Chest goalpost stretch   1. Lie on your back. Raise your knees so they are bent. Plant your feet on the floor, hip-width apart. 2. Tuck your chin, and relax your shoulders. 3. Reach your arms straight out to the sides. 4. Bend your arms at the elbows, with your hands pointed toward the top of your head. Your arms should make an L on either side of your head. Your palms should be facing up. 5. If you don't feel a mild stretch in your shoulders and across your chest, use a foam roll or tightly rolled blanket under your spine, from your tailbone to your head. 6. Relax in this position for at least 15 to 30 seconds while you breathe normally. Repeat 2 to 4 times. 7. Each day you do this exercise, add a little more time until you can relax in this position for 2 or 3 minutes. When you can relax for at least 2 minutes, you only need to do the exercise 1 time per session. Follow-up care is a key part of your treatment and safety. Be sure to make and go to all appointments, and call your doctor if you are having problems. It's also a good idea to know your test results and keep a list of the medicines you take. Where can you learn more? Go to https://TerraGo Technologiesozieleb.Sellbox. org and sign in to your [employer] account. Enter [phone] in the [employer] box to learn more about \"Shoulder Blade: Exercises. \" If you do not have an account, please click on the \"Sign Up Now\" link. Current as of: March 2, 2020               Content Version: 12.6  © 2006-2020 [employer] Inc[employer]. Care instructions adapted under license by [facility] ([facility]). If you have questions about a medical condition or this instruction, always ask your healthcare professional. [facility] any warranty or liability for your use of this information. Patient Education        Shoulder Arthritis: Exercises  Introduction  Here are some examples of exercises for you to try. The exercises may be suggested for a condition or for rehabilitation. Start each exercise slowly. Ease off the exercises if you start to have pain. You will be told when to start these exercises and which ones will work best for you. How to do the exercises  Shoulder flexion (lying down)   To make a wand for this exercise, use a piece of PVC pipe or a broom handle with the broom removed. Make the wand about a foot wider than your shoulders. 1. Lie on your back, holding a wand with both hands. Your palms should face down as you hold the wand. 2. Keeping your elbows straight, slowly raise your arms over your head. Raise them until you feel a stretch in your shoulders, upper back, and chest.  3. Hold for 15 to 30 seconds. 4. Repeat 2 to 4 times. Shoulder rotation (lying down)   To make a wand for this exercise, use a piece of PVC pipe or a broom handle with the broom removed. Make the wand about a foot wider than your shoulders. 1. Lie on your back. Hold a wand with both hands with your elbows bent and palms up. 2. Keep your elbows close to your body, and move the wand across your body toward the sore arm. 3. Hold for 8 to 12 seconds. 4. Repeat 2 to 4 times. Shoulder internal rotation with towel   1. Hold a towel above and behind your head with the arm that is not sore. 2. With your sore arm, reach behind your back and grasp the towel. 2. Stand or sit with your shoulder relaxed and your elbow bent 90 degrees. Your upper arm should rest comfortably against your side. Squeeze a rolled towel between your elbow and your body for comfort. This will help keep your arm at your side. 3. Hold one end of the elastic band in the hand of the painful arm. 4. Slowly rotate your forearm toward your body until it touches your belly. Slowly move it back to where you started. 5. Keep your elbow and upper arm firmly tucked against the towel roll or at your side. 6. Repeat 8 to 12 times. Pendulum swing   If you have pain in your back, do not do this exercise. 1. Hold on to a table or the back of a chair with your good arm. Then bend forward a little and let your sore arm hang straight down. This exercise does not use the arm muscles. Rather, use your legs and your hips to create movement that makes your arm swing freely. 2. Use the movement from your hips and legs to guide the slightly swinging arm back and forth like a pendulum (or elephant trunk). Then guide it in circles that start small (about the size of a dinner plate). Make the circles a bit larger each day, as your pain allows. 3. Do this exercise for 5 minutes, 5 to 7 times each day. 4. As you have less pain, try bending over a little farther to do this exercise. This will increase the amount of movement at your shoulder. Follow-up care is a key part of your treatment and safety. Be sure to make and go to all appointments, and call your doctor if you are having problems. It's also a good idea to know your test results and keep a list of the medicines you take. Where can you learn more? Go to https://FuturestateITgavi.KODA. org and sign in to your [employer] account. Enter H562 in the [employer] box to learn more about \"Shoulder Arthritis: Exercises. \"     If you do not have an account, please click on the \"Sign Up Now\" link. Current as of: March 2, 2020               Content Version: 12.6  © 2430-2779 [employer]. Care instructions adapted under license by [facility] ([facility]). If you have questions about a medical condition or this instruction, always ask your healthcare professional. [address] any warranty or liability for your use of this information. Patient Education        Headache: Care Instructions  Your Care Instructions     Headaches have many possible causes. Most headaches aren't a sign of a more serious problem, and they will get better on their own. Home treatment may help you feel better faster. The doctor has checked you carefully, but problems can develop later. If you notice any problems or new symptoms, get medical treatment right away. Follow-up care is a key part of your treatment and safety. Be sure to make and go to all appointments, and call your doctor if you are having problems. It's also a good idea to know your test results and keep a list of the medicines you take. How can you care for yourself at home? · Do not drive if you have taken a prescription pain medicine. · Rest in a quiet, dark room until your headache is gone. Close your eyes and try to relax or go to sleep. Don't watch TV or read. · Put a cold, moist cloth or cold pack on the painful area for 10 to 20 minutes at a time. Put a thin cloth between the cold pack and your skin. · Use a warm, moist towel or a heating pad set on low to relax tight shoulder and neck muscles. · Have someone gently massage your neck and shoulders. · Take pain medicines exactly as directed. ? If the doctor gave you a prescription medicine for pain, take it as prescribed. ? If you are not taking a prescription pain medicine, ask your doctor if you can take an over-the-counter medicine. · Be careful not to take pain medicine more often than the instructions allow, because you may get worse or more frequent headaches when the medicine wears off. · Do not ignore new symptoms that occur with a headache, such as a fever, weakness or numbness, vision changes, or confusion. These may be signs of a more serious problem. To prevent headaches  · Keep a headache diary so you can figure out what triggers your headaches. Avoiding triggers may help you prevent headaches. Record when each headache began, how long it lasted, and what the pain was like (throbbing, aching, stabbing, or dull). Write down any other symptoms you had with the headache, such as nausea, flashing lights or dark spots, or sensitivity to bright light or loud noise. Note if the headache occurred near your period. List anything that might have triggered the headache, such as certain foods (chocolate, cheese, wine) or odors, smoke, bright light, stress, or lack of sleep. · Find healthy ways to deal with stress. Headaches are most common during or right after stressful times. Take time to relax before and after you do something that has caused a headache in the past.  · Try to keep your muscles relaxed by keeping good posture. Check your jaw, face, neck, and shoulder muscles for tension, and try relaxing them. When sitting at a desk, change positions often, and stretch for 30 seconds each hour. · Get plenty of sleep and exercise. · Eat regularly and well. Long periods without food can trigger a headache. · Treat yourself to a massage. Some people find that regular massages are very helpful in relieving tension. · Limit caffeine by not drinking too much coffee, tea, or soda. But don't quit caffeine suddenly, because that can also give you headaches. · Reduce eyestrain from computers by blinking frequently and looking away from the computer screen every so often. Make sure you have proper eyewear and that your monitor is set up properly, about an arm's length away. · Seek help if you have depression or anxiety. Your headaches may be linked to these conditions. Treatment can both prevent headaches and help with symptoms of anxiety or depression. When should you call for help? Call 911 anytime you think you may need emergency care. For example, call if:    · You have signs of a stroke. These may include:  ? Sudden numbness, paralysis, or weakness in your face, arm, or leg, especially on only one side of your body. ? Sudden vision changes. ? Sudden trouble speaking. ? Sudden confusion or trouble understanding simple statements. ? Sudden problems with walking or balance. ? A sudden, severe headache that is different from past headaches. Call your doctor now or seek immediate medical care if:    · You have a new or worse headache.     · Your headache gets much worse. Where can you learn more? Go to https://2345.com.LiquidText. org and sign in to your [employer]egis [employer] account. Enter [id number] in the K[employer] box to learn more about \"Headache: Care Instructions. \"     If you do not have an account, please click on the \"Sign Up Now\" link. Current as of: November 20, 2019               Content Version: 12.6  © 0339-1690 [employer]atron, Incorporated. Care instructions adapted under license by [facility] ([facility]). If you have questions about a medical condition or this instruction, always ask your healthcare professional. [place] [address] any warranty or liability for your use of this information.          Patient Education        Migraine Headache: Care Instructions  Your Care Instructions Migraines are painful, throbbing headaches that often start on one side of the head. They may cause nausea and vomiting and make you sensitive to light, sound, or smell. Without treatment, migraines can last from 4 hours to a few days. Medicines can help prevent migraines or stop them after they have started. Your doctor can help you find which ones work best for you. Follow-up care is a key part of your treatment and safety. Be sure to make and go to all appointments, and call your doctor if you are having problems. It's also a good idea to know your test results and keep a list of the medicines you take. How can you care for yourself at home? · Do not drive if you have taken a prescription pain medicine. · Rest in a quiet, dark room until your headache is gone. Close your eyes, and try to relax or go to sleep. Don't watch TV or read. · Put a cold, moist cloth or cold pack on the painful area for 10 to 20 minutes at a time. Put a thin cloth between the cold pack and your skin. · Use a warm, moist towel or a heating pad set on low to relax tight shoulder and neck muscles. · Have someone gently massage your neck and shoulders. · Take your medicines exactly as prescribed. Call your doctor if you think you are having a problem with your medicine. You will get more details on the specific medicines your doctor prescribes. · Don't take medicine for headache pain too often. Talk to your doctor if you are taking medicine more than 2 days a week to stop a headache. Taking too much pain medicine can lead to more headaches. These are called medicine-overuse headaches.   To prevent migraines · Keep a headache diary so you can figure out what triggers your headaches. Avoiding triggers may help you prevent headaches. Record when each headache began, how long it lasted, and what the pain was like. Write down any other symptoms you had with the headache, such as nausea, flashing lights or dark spots, or sensitivity to bright light or loud noise. Note if the headache occurred near your period. List anything that might have triggered the headache. Triggers may include certain foods (chocolate, cheese, wine) or odors, smoke, bright light, stress, or lack of sleep. · If your doctor has prescribed medicine for your migraines, take it as directed. You may have medicine that you take only when you get a migraine and medicine that you take all the time to help prevent migraines. ? If your doctor has prescribed medicine for when you get a headache, take it at the first sign of a migraine, unless your doctor has given you other instructions. ? If your doctor has prescribed medicine to prevent migraines, take it exactly as prescribed. Call your doctor if you think you are having a problem with your medicine. · Find healthy ways to deal with stress. Migraines are most common during or right after stressful times. Try finding ways to reduce stress like practicing mindfulness or deep breathing exercises. · Get plenty of sleep and exercise. But be careful to not push yourself too hard during exercise. It may trigger a headache. · Eat meals on a regular schedule. Avoid foods and drinks that often trigger migraines. These include chocolate, alcohol (especially red wine and port), aspartame, monosodium glutamate (MSG), and some additives found in foods (such as hot dogs, greenfield, cold cuts, aged cheeses, and pickled foods). · Limit caffeine. Don't drink too much coffee, tea, or soda. But don't quit caffeine suddenly. That can also give you migraines. · Do not smoke or allow others to smoke around you. If you need help quitting, talk to your doctor about stop-smoking programs and medicines. These can increase your chances of quitting for good. · If you are taking birth control pills or hormone therapy, talk to your doctor about whether they are triggering your migraines. When should you call for help? Call 911 anytime you think you may need emergency care. For example, call if:    · You have signs of a stroke. These may include:  ? Sudden numbness, paralysis, or weakness in your face, arm, or leg, especially on only one side of your body. ? Sudden vision changes. ? Sudden trouble speaking. ? Sudden confusion or trouble understanding simple statements. ? Sudden problems with walking or balance. ? A sudden, severe headache that is different from past headaches. Call your doctor now or seek immediate medical care if:    · You have new or worse nausea and vomiting.     · You have a new or higher fever.     · Your headache gets much worse. Watch closely for changes in your health, and be sure to contact your doctor if:    · You are not getting better after 2 days (48 hours). Where can you learn more? Go to https://Turbulenz.J&V Big Game [employer]fitters. org and sign in to your [facility] account. Enter E647 in the [facility] box to learn more about \"Migraine Headache: Care Instructions. \"     If you do not have an account, please click on the \"Sign Up Now\" link. Current as of: November 20, 2019               Content Version: 12.6  © 0323-0739 [employer]ogogo, Incorporated. Care instructions adapted under license by [facility] ([facility]). If you have questions about a medical condition or this instruction, always ask your healthcare professional. [place] [address] any warranty or liability for your use of this information.

## 2021-02-24 LAB
ALBUMIN SERPL-MCNC: 4.9 G/DL (ref 3.4–5)
ALP BLD-CCNC: 110 U/L (ref 40–129)
ALT SERPL-CCNC: 67 U/L (ref 10–40)
ANION GAP SERPL CALCULATED.3IONS-SCNC: 12 MMOL/L (ref 3–16)
AST SERPL-CCNC: 32 U/L (ref 15–37)
BILIRUB SERPL-MCNC: 0.4 MG/DL (ref 0–1)
BILIRUBIN DIRECT: <0.2 MG/DL (ref 0–0.3)
BILIRUBIN, INDIRECT: ABNORMAL MG/DL (ref 0–1)
BUN BLDV-MCNC: 10 MG/DL (ref 7–20)
C-REACTIVE PROTEIN: <3 MG/L (ref 0–5.1)
CALCIUM SERPL-MCNC: 10.1 MG/DL (ref 8.3–10.6)
CHLORIDE BLD-SCNC: 102 MMOL/L (ref 99–110)
CO2: 24 MMOL/L (ref 21–32)
CREAT SERPL-MCNC: 0.6 MG/DL (ref 0.6–1.1)
FOLATE: 9.04 NG/ML (ref 4.78–24.2)
GFR AFRICAN AMERICAN: >60
GFR NON-AFRICAN AMERICAN: >60
GLUCOSE BLD-MCNC: 83 MG/DL (ref 70–99)
POTASSIUM SERPL-SCNC: 4.7 MMOL/L (ref 3.5–5.1)
SODIUM BLD-SCNC: 138 MMOL/L (ref 136–145)
TOTAL PROTEIN: 8.7 G/DL (ref 6.4–8.2)
TSH SERPL DL<=0.05 MIU/L-ACNC: 2.59 UIU/ML (ref 0.27–4.2)
VITAMIN B-12: 1355 PG/ML (ref 211–911)

## 2021-02-25 ASSESSMENT — ENCOUNTER SYMPTOMS
GASTROINTESTINAL NEGATIVE: 1
EYES NEGATIVE: 1
RESPIRATORY NEGATIVE: 1

## 2021-03-02 ENCOUNTER — TELEPHONE (OUTPATIENT)
Dept: PRIMARY CARE CLINIC | Age: 26
End: 2021-03-02

## 2021-03-05 DIAGNOSIS — R51.9 ACUTE INTRACTABLE HEADACHE, UNSPECIFIED HEADACHE TYPE: ICD-10-CM

## 2021-03-05 RX ORDER — RIZATRIPTAN BENZOATE 10 MG/1
10 TABLET ORAL
Qty: 12 TABLET | Refills: 2 | Status: SHIPPED | OUTPATIENT
Start: 2021-03-05 | End: 2021-03-29

## 2021-03-05 RX ORDER — SUMATRIPTAN 100 MG/1
100 TABLET, FILM COATED ORAL
Qty: 12 TABLET | Refills: 1 | Status: SHIPPED | OUTPATIENT
Start: 2021-03-05 | End: 2021-03-29 | Stop reason: SDUPTHER

## 2021-03-05 NOTE — TELEPHONE ENCOUNTER
Patient states that she can not  her maxalt until 3/14. Patient is taking medication daily, sometimes 2 pills a day. Can Rx be changed?

## 2021-03-05 NOTE — TELEPHONE ENCOUNTER
Medication:   Requested Prescriptions     Pending Prescriptions Disp Refills    rizatriptan (MAXALT) 10 MG tablet 12 tablet 2     Sig: Take 1 tablet by mouth once as needed for Migraine May repeat in 2 hours if needed     Last Filled:  2/23/21    Last appt: 2/23/2021   Next appt: Visit date not found

## 2021-03-08 ENCOUNTER — TELEPHONE (OUTPATIENT)
Dept: PRIMARY CARE CLINIC | Age: 26
End: 2021-03-08

## 2021-03-08 NOTE — TELEPHONE ENCOUNTER
Need to know if you want the directions kept they way they are or add for the patient to repeat in 2 hours.  Please Clarify on the following medication       SUMAtriptan (IMITREX) 100 MG tablet [[id number]]  ENDED    Order Details  Dose: 100 mg Route: Oral Frequency: ONCE PRN for Migraine   Dispense Quantity: 12 tablet Refills: 1          Sig: Take 1 tablet by mouth once as needed for Migraine                 Thank you

## 2021-03-29 ENCOUNTER — OFFICE VISIT (OUTPATIENT)
Dept: PRIMARY CARE CLINIC | Age: 26
End: 2021-03-29
Payer: COMMERCIAL

## 2021-03-29 VITALS
TEMPERATURE: 98 F | WEIGHT: 188.6 LBS | BODY MASS INDEX: 33.15 KG/M2 | RESPIRATION RATE: 12 BRPM | DIASTOLIC BLOOD PRESSURE: 70 MMHG | OXYGEN SATURATION: 99 % | HEART RATE: 115 BPM | SYSTOLIC BLOOD PRESSURE: 102 MMHG

## 2021-03-29 DIAGNOSIS — G43.819 OTHER MIGRAINE WITHOUT STATUS MIGRAINOSUS, INTRACTABLE: Primary | ICD-10-CM

## 2021-03-29 PROCEDURE — 99214 OFFICE O/P EST MOD 30 MIN: CPT | Performed by: FAMILY MEDICINE

## 2021-03-29 PROCEDURE — 1036F TOBACCO NON-USER: CPT | Performed by: FAMILY MEDICINE

## 2021-03-29 PROCEDURE — G8427 DOCREV CUR MEDS BY ELIG CLIN: HCPCS | Performed by: FAMILY MEDICINE

## 2021-03-29 PROCEDURE — G8417 CALC BMI ABV UP PARAM F/U: HCPCS | Performed by: FAMILY MEDICINE

## 2021-03-29 PROCEDURE — G8482 FLU IMMUNIZE ORDER/ADMIN: HCPCS | Performed by: FAMILY MEDICINE

## 2021-03-29 RX ORDER — SUMATRIPTAN 100 MG/1
100 TABLET, FILM COATED ORAL
Qty: 12 TABLET | Refills: 1 | Status: SHIPPED | OUTPATIENT
Start: 2021-03-29 | End: 2021-05-20

## 2021-03-29 RX ORDER — TOPIRAMATE 25 MG/1
25 TABLET ORAL NIGHTLY
Qty: 30 TABLET | Refills: 2 | Status: SHIPPED | OUTPATIENT
Start: 2021-03-29 | End: 2021-04-20

## 2021-03-29 ASSESSMENT — ENCOUNTER SYMPTOMS
RESPIRATORY NEGATIVE: 1
GASTROINTESTINAL NEGATIVE: 1
EYES NEGATIVE: 1

## 2021-03-29 NOTE — PROGRESS NOTES
SUBJECTIVE:  Patient ID: Heidi Lockwood is a 27 y.o. y.o. female     Migraine   Pertinent negatives include no dizziness, numbness, seizures or weakness. Headache: Patient presents with headache, her migraine headache is getting worse Maxalt was not helping much Imitrex was sent to the wrong pharmacy could not get it symptoms began about several months ago. Generally, the headaches last about a few hours and occur frequent, daily. The headache do not seem to be related to any time of the day. The headaches are usually sharp and throbbing and are right-sided unilateral, left-sided unilateral in location. The patient rates her most severe headaches a 8 on a scale from 1 to 10. Recently, the headaches are increasing in frequency. School attendance or other daily activities are affected by the headaches. Precipitating factors include none which have been determined. The headaches are usually not preceded by an aura. Associated neurologic symptoms which are present include: decreased physical activity. The patient denies depression, loss of balance, muscle weakness, numbness of extremities, vomiting in the early morning and worsening school/work performance. Other associated symptoms include: nothing pertinent. Symptoms which are not present include: conjunctivitis, diarrhea, ear pulling, fever, hoarseness, nasal congestion, nausea, photophobia and rash. Home treatment has included acetaminophen and ibuprofen with fair improvement. Other history includes: headaches of unknown type diagnosed in the past. Family history includes no known family members with significant headaches.         Past Medical History:   Diagnosis Date    Anxiety     [facility] ([facility]     Borderline personality disorder ([facility]     Depression     H/O drug abuse (HCC)     hx of heroin/cocaine    IBS (irritable bowel syndrome)     Obese       Past Surgical History:   Procedure Laterality Date    WISDOM TOOTH EXTRACTION       Family History   Problem Relation Age of Onset    Other Maternal Grandmother         demetia    Heart Disease Maternal Grandmother     High Cholesterol Maternal Grandfather     Diabetes Maternal Grandfather     High Blood Pressure Maternal Grandfather     Heart Disease Maternal Grandfather      Social History     Socioeconomic History    Marital status: Life Partner     Spouse name: Not on file    Number of children: Not on file    Years of education: Not on file    Highest education level: Not on file   Occupational History     Comment: Refused to answer   Social Needs    Financial resource strain: Not hard at all   [address] insecurity     Worry: Never true     Inability: Never true    Transportation needs     Medical: No     Non-medical: No   Tobacco Use    Smoking status: Never Smoker    Smokeless tobacco: Never Used   Substance and Sexual Activity    Alcohol use: Yes     Comment: socially    Drug use: Yes     Types: Cocaine, IV     Comment: heroin-snort    Sexual activity: Yes     Partners: Male   Lifestyle    Physical activity     Days per week: Not on file     Minutes per session: Not on file    Stress: Not on file   Relationships    Social connections     Talks on phone: Not on file     Gets together: Not on file     Attends Restorationist service: Not on file     Active member of club or organization: Not on file     Attends meetings of clubs or organizations: Not on file     Relationship status: Not on file    Intimate partner violence     Fear of current or ex partner: Not on file     Emotionally abused: Not on file     Physically abused: Not on file     Forced sexual activity: Not on file   Other Topics Concern    Not on file   Social History Narrative    Not on file     Current Outpatient Medications   Medication Sig Dispense Refill    fluticasone (FLONASE) 50 MCG/ACT nasal spray 2 sprays by Each Nostril route daily 1 Bottle 1    metFORMIN (GLUCOPHAGE) 500 MG tablet Take 500 mg by mouth 2 times daily (with meals)       acyclovir (ZOVIRAX) 400 MG tablet Take 400 mg by mouth      ibuprofen (ADVIL;MOTRIN) 800 MG tablet Take 800 mg by mouth      SUMAtriptan (IMITREX) 100 MG tablet Take 1 tablet by mouth once as needed for Migraine 12 tablet 1    vitamin B-12 (CYANOCOBALAMIN) 100 MCG tablet Take 50 mcg by mouth daily      ferrous sulfate (FE TABS 325) 325 (65 Fe) MG EC tablet Take 325 mg by mouth daily (with breakfast)       No current facility-administered medications for this visit. Allergies   Allergen Reactions    Nickel Rash     Skin began to excoriate    Lexapro [Escitalopram Oxalate]      Jittery and could not sleep       Review of Systems   Constitutional: Negative. HENT: Negative. Eyes: Negative. Respiratory: Negative. Cardiovascular: Negative. Gastrointestinal: Negative. Neurological: Positive for headaches. Negative for dizziness, tremors, seizures, syncope, facial asymmetry, speech difficulty, weakness, light-headedness and numbness. All other systems reviewed and are negative. OBJECTIVE:  /70 (Site: Left Upper Arm, Position: Sitting, Cuff Size: Large Adult)   Pulse 115   Temp 98 °F (36.7 °C)   Resp 12   Wt 188 lb 9.6 oz (85.5 kg)   LMP 03/10/2021   SpO2 99%   BMI 33.15 kg/m²     Physical Exam  Vitals signs reviewed. Constitutional:       Appearance: Normal appearance. HENT:      Head: Normocephalic and atraumatic. Eyes:      General: No scleral icterus. Conjunctiva/sclera: Conjunctivae normal.   Neck:      Thyroid: No thyromegaly. Vascular: No JVD. Cardiovascular:      Rate and Rhythm: Normal rate and regular rhythm. Heart sounds: Normal heart sounds. No murmur. No friction rub. No gallop. Pulmonary:      Effort: Pulmonary effort is normal.      Breath sounds: Normal breath sounds. No wheezing or rales. Abdominal:      General: Bowel sounds are normal. There is no distension. Palpations: Abdomen is soft.  There is no mass. Tenderness: There is no abdominal tenderness. Hernia: No hernia is present. Musculoskeletal:      Left shoulder: She exhibits normal range of motion, no tenderness, no bony tenderness, no swelling, no effusion, no crepitus, no deformity, no laceration, no pain, no spasm, normal pulse and normal strength. Lymphadenopathy:      Cervical: No cervical adenopathy. Skin:     Findings: No rash. Neurological:      Mental Status: She is alert and oriented to person, place, and time. ASSESSMENT:   Diagnosis Orders   1.  Other migraine without status migrainosus, intractable  SUMAtriptan (IMITREX) 100 MG tablet    MRI BRAIN W WO CONTRAST    topiramate (TOPAMAX) 25 MG tablet       PLAN:  See orders  Start Topamax  Close follow-up  Keep a diary of headache  Information for migraine/headaches given to patient

## 2021-04-06 ENCOUNTER — HOSPITAL ENCOUNTER (OUTPATIENT)
Dept: MRI IMAGING | Age: 26
Discharge: HOME | End: 2021-04-06
Payer: COMMERCIAL

## 2021-04-06 DIAGNOSIS — G43.819 OTHER MIGRAINE WITHOUT STATUS MIGRAINOSUS, INTRACTABLE: ICD-10-CM

## 2021-04-06 PROCEDURE — A9579 GAD-BASE MR CONTRAST NOS,1ML: HCPCS | Performed by: FAMILY MEDICINE

## 2021-04-06 PROCEDURE — 70553 MRI BRAIN STEM W/O & W/DYE: CPT

## 2021-04-06 PROCEDURE — 6360000004 HC RX CONTRAST MEDICATION: Performed by: FAMILY MEDICINE

## 2021-04-06 RX ADMIN — GADOTERIDOL 17 ML: 279.3 INJECTION, SOLUTION INTRAVENOUS at 13:25

## 2021-04-12 ENCOUNTER — VIRTUAL VISIT (OUTPATIENT)
Dept: PRIMARY CARE CLINIC | Age: 26
End: 2021-04-12
Payer: COMMERCIAL

## 2021-04-12 DIAGNOSIS — J06.9 VIRAL URI: Primary | ICD-10-CM

## 2021-04-12 PROCEDURE — 1036F TOBACCO NON-USER: CPT | Performed by: FAMILY MEDICINE

## 2021-04-12 PROCEDURE — G8417 CALC BMI ABV UP PARAM F/U: HCPCS | Performed by: FAMILY MEDICINE

## 2021-04-12 PROCEDURE — 99213 OFFICE O/P EST LOW 20 MIN: CPT | Performed by: FAMILY MEDICINE

## 2021-04-12 PROCEDURE — G8427 DOCREV CUR MEDS BY ELIG CLIN: HCPCS | Performed by: FAMILY MEDICINE

## 2021-04-12 ASSESSMENT — ENCOUNTER SYMPTOMS
GASTROINTESTINAL NEGATIVE: 1
STRIDOR: 0
WHEEZING: 0
SHORTNESS OF BREATH: 0
SORE THROAT: 1
COUGH: 1
APNEA: 0
CHOKING: 0
CHEST TIGHTNESS: 0

## 2021-04-12 NOTE — PROGRESS NOTES
2021    TELEHEALTH EVALUATION -- Audio/Visual (During KKNUT-04 public health emergency)    HPI:    Morales Magallanes (:  1995) has requested an audio/video evaluation for the following concern(s):    Upper Respiratory Infection: Patient complains of symptoms of a URI. Symptoms include congestion, cough, fever and sore throat. Onset of symptoms was a few days ago, gradually improving since that time. She also c/o congestion, low grade fever, nasal congestion, productive cough with  clear  colored sputum and sinus pressure for the past 5 days . She is drinking plenty of fluids. Evaluation to date: none. Treatment to date: oral decongestant, antihistamines. Review of Systems   Constitutional: Positive for chills and fever. HENT: Positive for congestion, postnasal drip and sore throat. Respiratory: Positive for cough. Negative for apnea, choking, chest tightness, shortness of breath, wheezing and stridor. Cardiovascular: Negative. Gastrointestinal: Negative. All other systems reviewed and are negative. Prior to Visit Medications    Medication Sig Taking?  Authorizing Provider   topiramate (TOPAMAX) 25 MG tablet Take 1 tablet by mouth nightly Yes Timothy Macedo MD   fluticasone (FLONASE) 50 MCG/ACT nasal spray 2 sprays by Each Nostril route daily Yes Timothy Macedo MD   metFORMIN (GLUCOPHAGE) 500 MG tablet Take 500 mg by mouth 2 times daily (with meals)  Yes Historical Provider, MD   acyclovir (ZOVIRAX) 400 MG tablet Take 400 mg by mouth Yes Historical Provider, MD   ibuprofen (ADVIL;MOTRIN) 800 MG tablet Take 800 mg by mouth Yes Historical Provider, MD   SUMAtriptan (IMITREX) 100 MG tablet Take 1 tablet by mouth once as needed for Migraine  Timothy Macedo MD   vitamin B-12 (CYANOCOBALAMIN) 100 MCG tablet Take 50 mcg by mouth daily  Historical Provider, MD   ferrous sulfate (FE TABS 325) 325 (65 Fe) MG EC tablet Take 325 mg by mouth daily (with breakfast)  Historical Provider, MD       Social History     Tobacco Use    Smoking status: Never Smoker    Smokeless tobacco: Never Used   Substance Use Topics    Alcohol use: Yes     Comment: socially    Drug use: Yes     Types: Cocaine, IV     Comment: heroin-snort        Allergies   Allergen Reactions    Nickel Rash     Skin began to excoriate    Lexapro [Escitalopram Oxalate]      Jittery and could not sleep   ,   Past Medical History:   Diagnosis Date    Anxiety     B[facility] 2 disorder ([facility]     Borderline personality disorder ([facility]     Depression     [facility]     hx of heroin/cocaine    IBS (irritable bowel syndrome)     Obese    ,   Past Surgical History:   Procedure Laterality Date    WISDOM TOOTH EXTRACTION     ,   Social History     Tobacco Use    Smoking status: Never Smoker    Smokeless tobacco: Never Used   Substance Use Topics    Alcohol use: Yes     Comment: socially    Drug use: Yes     Types: Cocaine, IV     Comment: heroin-snort   ,   Family History   Problem Relation Age of Onset    Other Maternal Grandmother         demetia    Heart Disease Maternal Grandmother     High Cholesterol Maternal Grandfather     Diabetes Maternal Grandfather     High Blood Pressure Maternal Grandfather     Heart Disease Maternal Grandfather    ,   Immunization History   Administered Date(s) Administered    BCG (Tin BCG) 08/21/1998, 03/12/2012    DTP 1993, 1993, 1993, 08/08/1994, 08/21/1998    HPV Quadrivalent (Gardasil) 05/18/2009, 07/23/2009, 11/25/2009    Hepatitis B 08/21/1998, 10/24/1998, 12/12/1998    Hib, unspecified 08/21/1998    Influenza Nasal 09/29/2010    Influenza Vaccine, unspecified formulation 01/11/2013, 10/22/2013, 10/21/2015, 10/01/2016, 09/29/2017    Influenza, Live, Intranasal, Quadv, (Flumist 2-49 yrs) 09/29/2010    Influenza, MDCK Quadv, IM, PF (Flucelvax 4 yrs and older) 09/17/2020    Influenza, Quadv, IM, PF (6 mo and older Fluzone, Flulaval, Fluarix, and 3 yrs and older Afluria) 10/21/2015, 11/09/2018    MMR 05/20/1994, 02/16/2004    Meningococcal MPSV4 (Menomune) 06/20/2005, 08/17/2011    PPD Test 03/12/2012    Polio OPV 1993, 1993, 1993, 08/08/1994    Td, unspecified formulation 06/20/2005    Tdap (Boostrix, Adacel) 05/18/2009, 12/29/2017    Varicella (Varivax) 09/26/1998, 12/16/2013   ,   Health Maintenance   Topic Date Due    Hepatitis C screen  Never done    Hepatitis B vaccine (4 of 4 - 4-dose series) 12/19/1998    COVID-19 Vaccine (1) Never done    Cervical cancer screen  09/11/2020    DTaP/Tdap/Td vaccine (8 - Td) 12/29/2027    Varicella vaccine  Completed    Flu vaccine  Completed    HIV screen  Completed    Hepatitis A vaccine  Aged Out    Hib vaccine  Aged Out    Meningococcal (ACWY) vaccine  Aged Out    Pneumococcal 0-64 years Vaccine  Aged Out       PHYSICAL EXAMINATION:  [ INSTRUCTIONS:  \"[x]\" Indicates a positive item  \"[]\" Indicates a negative item  -- DELETE ALL ITEMS NOT EXAMINED]  Vital Signs: (As obtained by patient/caregiver or practitioner observation)    Blood pressure-  Heart rate-    Respiratory rate-    Temperature-  Pulse oximetry-     Constitutional: [x] Appears well-developed and well-nourished [x] No apparent distress      [] Abnormal-   Mental status  [x] Alert and awake  [x] Oriented to person/place/time []Able to follow commands      Eyes:  EOM    [x]  Normal  [] Abnormal-  Sclera  [x]  Normal  [] Abnormal -         Discharge [x]  None visible  [] Abnormal -    HENT:   [x] Normocephalic, atraumatic.   [] Abnormal   [x] Mouth/Throat: Mucous membranes are moist.     External Ears [x] Normal  [] Abnormal-     Neck: [x] No visualized mass     Pulmonary/Chest: [] Respiratory effort normal.  [] No visualized signs of difficulty breathing or respiratory distress        [] Abnormal-      Musculoskeletal:   [] Normal gait with no signs of ataxia         [] Normal range of motion of neck        [] Abnormal-       Neurological: [x] No Facial Asymmetry (Cranial nerve 7 motor function) (limited exam to video visit)          [] No gaze palsy        [] Abnormal-         Skin:        [x] No significant exanthematous lesions or discoloration noted on facial skin         [] Abnormal-            Psychiatric:       [x] Normal Affect [] No Hallucinations        [] Abnormal-     Other pertinent observable physical exam findings-     ASSESSMENT/PLAN:   Diagnosis Orders   1. Viral URI     Symptomatic treatment orders   recommend doing Covid testing  She ready feeling better to monitor her symptoms    Isa Hunt, was evaluated through a synchronous (real-time) audio-video encounter. The patient (or guardian if applicable) is aware that this is a billable service. Verbal consent to proceed has been obtained within the past 12 months. The visit was conducted pursuant to the emergency declaration under the [address] [place] [address] authority and the [employer] General Act. Patient identification was verified, and a caregiver was present when appropriate. The patient was located in a state where the provider was credentialed to provide care. Total time spent on this encounter: Not billed by time    --Lashawn Fernaneds MD on 4/12/2021 at 2:53 PM    An electronic signature was used to authenticate this note.

## 2021-04-13 ENCOUNTER — OFFICE VISIT (OUTPATIENT)
Dept: PRIMARY CARE CLINIC | Age: 26
End: 2021-04-13
Payer: COMMERCIAL

## 2021-04-13 DIAGNOSIS — Z20.822 SUSPECTED COVID-19 VIRUS INFECTION: Primary | ICD-10-CM

## 2021-04-13 LAB — SARS-COV-2: NOT DETECTED

## 2021-04-13 PROCEDURE — 99211 OFF/OP EST MAY X REQ PHY/QHP: CPT | Performed by: NURSE PRACTITIONER

## 2021-04-13 PROCEDURE — G8417 CALC BMI ABV UP PARAM F/U: HCPCS | Performed by: NURSE PRACTITIONER

## 2021-04-13 PROCEDURE — G8428 CUR MEDS NOT DOCUMENT: HCPCS | Performed by: NURSE PRACTITIONER

## 2021-04-13 NOTE — PROGRESS NOTES
Rocio Starks received a viral test for COVID-19. They were educated on isolation and quarantine as appropriate. For any symptoms, they were directed to seek care from their PCP, given contact information to establish with a doctor, directed to an urgent care or the emergency room.

## 2021-04-20 DIAGNOSIS — G43.819 OTHER MIGRAINE WITHOUT STATUS MIGRAINOSUS, INTRACTABLE: ICD-10-CM

## 2021-04-20 DIAGNOSIS — H69.82 DYSFUNCTION OF LEFT EUSTACHIAN TUBE: ICD-10-CM

## 2021-04-20 RX ORDER — TOPIRAMATE 25 MG/1
TABLET ORAL
Qty: 30 TABLET | Refills: 2 | Status: SHIPPED | OUTPATIENT
Start: 2021-04-20 | End: 2021-05-18

## 2021-04-20 RX ORDER — FLUTICASONE PROPIONATE 50 MCG
SPRAY, SUSPENSION (ML) NASAL
Qty: 1 BOTTLE | Refills: 1 | Status: SHIPPED | OUTPATIENT
Start: 2021-04-20 | End: 2021-06-22

## 2021-04-20 NOTE — TELEPHONE ENCOUNTER
Medication:   Requested Prescriptions     Pending Prescriptions Disp Refills    fluticasone (FLONASE) 50 MCG/ACT nasal spray [Pharmacy Med Name: FLUTICASONE PROP 50 MCG SPRAY] 1 Bottle 1     Sig: SPRAY 2 SPRAYS INTO EACH NOSTRIL EVERY DAY    topiramate (TOPAMAX) 25 MG tablet [Pharmacy Med Name: TOPIRAMATE 25 MG TABLET] 30 tablet 2     Sig: TAKE 1 TABLET BY MOUTH EVERY DAY AT NIGHT        Last Filled:      Patient Phone Number: [phone] (home)     Last appt: 4/12/2021   Next appt: 4/26/2021    Last OARRS: No flowsheet data found.     Preferred Pharmacy:   [address], [address] [place] [address] [phone] Adrianne Husain [phone]  [address] [place] [id number]  Phone: [phone] Fax: [address] [facility], Jenison Oostsingel  [phone] Adrianne Husain [phone]  [address] [place] [address] [address] [address]  Phone: [phone] Fax: [phone]    [facility] [phone] [place] [address], [address]  [phone] Adrianne Husain [phone]  [place] [address]  Phone: [phone] Fax: [phone]

## 2021-04-26 ENCOUNTER — OFFICE VISIT (OUTPATIENT)
Dept: PRIMARY CARE CLINIC | Age: 26
End: 2021-04-26
Payer: COMMERCIAL

## 2021-04-26 VITALS
TEMPERATURE: 97.9 F | DIASTOLIC BLOOD PRESSURE: 70 MMHG | BODY MASS INDEX: 32.3 KG/M2 | RESPIRATION RATE: 12 BRPM | HEART RATE: 106 BPM | SYSTOLIC BLOOD PRESSURE: 124 MMHG | WEIGHT: 183.8 LBS | OXYGEN SATURATION: 99 %

## 2021-04-26 DIAGNOSIS — G43.819 OTHER MIGRAINE WITHOUT STATUS MIGRAINOSUS, INTRACTABLE: Primary | ICD-10-CM

## 2021-04-26 DIAGNOSIS — J32.0 MAXILLARY SINUSITIS, UNSPECIFIED CHRONICITY: ICD-10-CM

## 2021-04-26 PROCEDURE — 1036F TOBACCO NON-USER: CPT | Performed by: FAMILY MEDICINE

## 2021-04-26 PROCEDURE — G8417 CALC BMI ABV UP PARAM F/U: HCPCS | Performed by: FAMILY MEDICINE

## 2021-04-26 PROCEDURE — G8427 DOCREV CUR MEDS BY ELIG CLIN: HCPCS | Performed by: FAMILY MEDICINE

## 2021-04-26 PROCEDURE — 99214 OFFICE O/P EST MOD 30 MIN: CPT | Performed by: FAMILY MEDICINE

## 2021-04-26 RX ORDER — AMOXICILLIN AND CLAVULANATE POTASSIUM 875; 125 MG/1; MG/1
1 TABLET, FILM COATED ORAL 2 TIMES DAILY
Qty: 20 TABLET | Refills: 0 | Status: SHIPPED | OUTPATIENT
Start: 2021-04-26 | End: 2021-05-06

## 2021-04-26 ASSESSMENT — ENCOUNTER SYMPTOMS
RESPIRATORY NEGATIVE: 1
SINUS PRESSURE: 1
GASTROINTESTINAL NEGATIVE: 1
EYES NEGATIVE: 1

## 2021-04-26 NOTE — PROGRESS NOTES
SUBJECTIVE:  Patient ID: Shukri Hart is a 27 y.o. y.o. female        Headache: Patient presents for follow up  with headache. Symptoms began about several months ago. Generally, the headaches last about a few hours and occur frequent, daily. The headache do not seem to be related to any time of the day. The headaches are usually sharp and throbbing and are right-sided unilateral, left-sided unilateral in location. The patient rates her most severe headaches a 8 on a scale from 1 to 10. Recently, the headaches are decreasing in frequency  activities are affected by the headaches. Precipitating factors include none which have been determined. The headaches are usually not preceded by an aura. Associated neurologic symptoms which are present include: decreased physical activity. The patient denies depression, loss of balance, muscle weakness, numbness of extremities, vomiting in the early morning and worsening school/work performance. Other associated symptoms include: nothing pertinent. Symptoms which are not present include: conjunctivitis, diarrhea, ear pulling, fever, hoarseness, nasal congestion, nausea, photophobia and rash. Home treatment has included acetaminophen and ibuprofen with fair improvement. Other history includes: headaches of unknown type diagnosed in the past. Family history includes no known family members with significant headaches.   Doing a lot better on current medication  She had an MRI show thickening in the mucus in the sinuses plus mucus retention cysts in the left maxillary where she feels most of the pressure        Past Medical History:   Diagnosis Date    Anxiety     Bipolar 2 disorder ([address]     [facility]line [facility]     Depression     H/O drug abuse ([facility]     hx of heroin/cocaine    IBS (irritable bowel syndrome)     Obese       Past Surgical History:   Procedure Laterality Date    WISDOM TOOTH EXTRACTION       Family History   Problem Relation Age of Onset  Other Maternal Grandmother         demetia    Heart Disease Maternal Grandmother     High Cholesterol Maternal Grandfather     Diabetes Maternal Grandfather     High Blood Pressure Maternal Grandfather     Heart Disease Maternal Grandfather      Social History     Socioeconomic History    Marital status: Life Partner     Spouse name: None    Number of children: None    Years of education: None    Highest education level: None   Occupational History     Comment: Refused to answer   Social Needs    Financial resource strain: Not hard at all   Kenisha-Mariela insecurity     Worry: Never true     Inability: Never true    Transportation needs     Medical: No     Non-medical: No   Tobacco Use    Smoking status: Never Smoker    Smokeless tobacco: Never Used   Substance and Sexual Activity    Alcohol use: Yes     Comment: socially    Drug use: Yes     Types: Cocaine, IV     Comment: heroin-snort    Sexual activity: Yes     Partners: Male   Lifestyle    Physical activity     Days per week: None     Minutes per session: None    Stress: None   Relationships    Social connections     Talks on phone: None     Gets together: None     Attends Anabaptism service: None     Active member of club or organization: None     Attends meetings of clubs or organizations: None     Relationship status: None    Intimate partner violence     Fear of current or ex partner: None     Emotionally abused: None     Physically abused: None     Forced sexual activity: None   Other Topics Concern    None   Social History Narrative    None     Current Outpatient Medications   Medication Sig Dispense Refill    Fexofenadine-Pseudoephedrine (ALLEGRA-D 12 HOUR PO) Take 1 tablet by mouth daily      fluticasone (FLONASE) 50 MCG/ACT nasal spray SPRAY 2 SPRAYS INTO EACH NOSTRIL EVERY DAY 1 Bottle 1    topiramate (TOPAMAX) 25 MG tablet TAKE 1 TABLET BY MOUTH EVERY DAY AT NIGHT 30 tablet 2    metFORMIN (GLUCOPHAGE) 500 MG tablet Take 500 mg by mouth 2 times daily (with meals)       acyclovir (ZOVIRAX) 400 MG tablet Take 400 mg by mouth      ibuprofen (ADVIL;MOTRIN) 800 MG tablet Take 800 mg by mouth      SUMAtriptan (IMITREX) 100 MG tablet Take 1 tablet by mouth once as needed for Migraine 12 tablet 1     No current facility-administered medications for this visit. Allergies   Allergen Reactions    Nickel Rash     Skin began to excoriate    Lexapro [Escitalopram Oxalate]      Jittery and could not sleep       Review of Systems   Constitutional: Negative. HENT: Positive for sinus pressure. Eyes: Negative. Respiratory: Negative. Cardiovascular: Negative. Gastrointestinal: Negative. Neurological: Positive for headaches. Negative for dizziness, tremors, seizures, syncope, facial asymmetry, speech difficulty, weakness, light-headedness and numbness. All other systems reviewed and are negative. OBJECTIVE:  /70 (Site: Left Upper Arm, Position: Sitting, Cuff Size: Large Adult)   Pulse 106   Temp 97.9 °F (36.6 °C) (Oral)   Resp 12   Wt 183 lb 12.8 oz (83.4 kg)   LMP 04/12/2021   SpO2 99%   BMI 32.30 kg/m²     Physical Exam  Vitals signs reviewed. Constitutional:       Appearance: Normal appearance. HENT:      Head: Normocephalic and atraumatic. Eyes:      General: No scleral icterus. Conjunctiva/sclera: Conjunctivae normal.   Neck:      Thyroid: No thyromegaly. Vascular: No JVD. Cardiovascular:      Rate and Rhythm: Normal rate and regular rhythm. Heart sounds: Normal heart sounds. No murmur. No friction rub. No gallop. Pulmonary:      Effort: Pulmonary effort is normal.      Breath sounds: Normal breath sounds. No wheezing or rales. Abdominal:      General: Bowel sounds are normal. There is no distension. Palpations: Abdomen is soft. There is no mass. Tenderness: There is no abdominal tenderness. Hernia: No hernia is present.    Musculoskeletal:      Left shoulder: She exhibits decreased range of motion, tenderness and pain. She exhibits no bony tenderness, no swelling, no effusion, no crepitus, no deformity, no laceration, no spasm, normal pulse and normal strength. Lymphadenopathy:      Cervical: No cervical adenopathy. Skin:     Findings: No rash. Neurological:      Mental Status: She is alert and oriented to person, place, and time. ASSESSMENT:   Diagnosis Orders   1. Other migraine without status migrainosus, intractable     2.  Maxillary sinusitis, unspecified chronicity         PLAN:  See orders  Doing better continue the same  Trial of antibiotic  If not better with a pressure in the sinuses may need to be seen by ENT she will call and let me know  Reviewed MRI results with the patient answered all her questions and concerns

## 2021-05-10 ENCOUNTER — TELEPHONE (OUTPATIENT)
Dept: PRIMARY CARE CLINIC | Age: 26
End: 2021-05-10

## 2021-05-10 NOTE — TELEPHONE ENCOUNTER
Pt says her migraines are not getting any better. She was told to call the office if this is the case and Dr. Mandi Lobato will refer her to a specialist. Please put in a referral for the pt and let her know who Dr. Mandi Lobato recommends.     LOV 4/26/21  FOV 7/26/21

## 2021-05-11 ENCOUNTER — PATIENT MESSAGE (OUTPATIENT)
Dept: PRIMARY CARE CLINIC | Age: 26
End: 2021-05-11

## 2021-05-12 NOTE — TELEPHONE ENCOUNTER
From: Neto Anglin  To: Barbi Branham MD  Sent: 5/11/2021 3:21 PM EDT  Subject: Visit Euniceozzy Lawson! I called the office yesterday but haven't heard anything back. Looks like I'll need the referral to the Ear, Nose, Throat doctor. Still having the constant pressure on the left side of my face/head.      Neto Anglin

## 2021-05-17 DIAGNOSIS — H69.82 DYSFUNCTION OF LEFT EUSTACHIAN TUBE: Primary | ICD-10-CM

## 2021-05-18 DIAGNOSIS — G43.819 OTHER MIGRAINE WITHOUT STATUS MIGRAINOSUS, INTRACTABLE: ICD-10-CM

## 2021-05-18 RX ORDER — TOPIRAMATE 25 MG/1
25 TABLET ORAL 2 TIMES DAILY
Qty: 30 TABLET | Refills: 2 | Status: SHIPPED | OUTPATIENT
Start: 2021-05-18 | End: 2021-06-01

## 2021-05-19 DIAGNOSIS — G43.819 OTHER MIGRAINE WITHOUT STATUS MIGRAINOSUS, INTRACTABLE: ICD-10-CM

## 2021-05-19 NOTE — TELEPHONE ENCOUNTER
Medication:   Requested Prescriptions     Pending Prescriptions Disp Refills    SUMAtriptan (IMITREX) 100 MG tablet [Pharmacy Med Name: SUMATRIPTAN SUCC 100 MG TABLET] 12 tablet 1     Sig: TAKE 1 TABLET BY MOUTH ONCE AS NEEDED FOR MIGRAINE     Last Filled:  3/29/21    Last appt: 4/26/2021   Next appt: 7/26/2021

## 2021-05-20 RX ORDER — SUMATRIPTAN 100 MG/1
100 TABLET, FILM COATED ORAL
Qty: 12 TABLET | Refills: 1 | Status: SHIPPED | OUTPATIENT
Start: 2021-05-20 | End: 2021-07-20

## 2021-05-20 NOTE — PROGRESS NOTES
Morales Magallanes   1995, 27 y.o. female   <[id number]>       Referring Provider: Liza Schmitt DO  Referral Type: In an order in [address] [place] [address]    Reason for Visit: Evaluation of suspected change in hearing, tinnitus, or balance. ADULT AUDIOLOGIC EVALUATION      Morales Magallanes is a 27 y.o. female seen today, 5/25/2021, for an initial audiologic evaluation. AUDIOLOGIC AND OTHER PERTINENT MEDICAL HISTORY:        Morales Magallanes noted ear pressure left, pressure sensation in head and left ear, all left sided, 24/7, onset around 16 months ago; sensitivity to sounds in left ear; has migraine headaches; dizziness - comes and goes, feels like she hasn't eaten, improves with rest; some intermittent tinnitus; can have pain in left ear, can feel like she has an ear infection at times. Morales Magallanes denied otorrhea, history of occupational/recreational noise exposure, history of ear surgery, and family history of hearing loss. IMPRESSIONS:       Today's results are consistent with hearing sensitivity within normal limits with normal middle ear function and excellent word recognition for both ears. Discussed good communication strategies; follow medical recommendations from Dr. Barrett Kwon. ASSESSMENT AND FINDINGS:       Otoscopy revealed: Minimal cerumen in ear canals bilaterally      RIGHT EAR:  Hearing Sensitivity: Within normal limits. Speech Recognition Threshold: 10 dBHL  Word Recognition: Excellent (100%), based on NU-6 25-word list at 45 dBHL using recorded speech stimuli. Tympanometry: Normal peak pressure and compliance, Type A tympanogram, consistent with normal middle ear function. LEFT EAR:  Hearing Sensitivity: Within normal limits. Speech Recognition Threshold: 5 dBHL  Word Recognition: Excellent (100%), based on NU-6 25-word list at 45 dBHL using recorded speech stimuli. Tympanometry: Normal peak pressure and compliance, Type A tympanogram, consistent with normal middle ear function. Reliability: Good  Transducer: Inserts    See scanned audiogram dated 5/25/2021 for results. PATIENT EDUCATION:       The following items were discussed with the patient:    - Good Communication Strategies   - Tinnitus Management Strategies    - Dizziness    Educational information was shared in the After Visit Summary. RECOMMENDATIONS:                                                                                                                                                                                                                                                                      The following items are recommended based on patient report and results from today's appointment:   - Continue medical follow-up with Jerardo Montana DO.   - Retest hearing as medically indicated and/or sooner if a change in hearing is noted. - Utilize \"Good Communication Strategies\" as discussed to assist in speech understanding with communication partners. - Maintain a sound enriched environment to assist in the management of tinnitus symptoms. - If medically indicated, consider vestibular evaluation to further investigate symptoms of dizziness. [place], [place]  Audiologist      Chart CC'd to:  Jerardo Montana DO      Degree of   Hearing Sensitivity dB Range   Within Normal Limits (WNL) 0 - 20   Mild 20 - 40   Moderate 40 - 55   Moderately-Severe 55 - 70   Severe 70 - 90   Profound 90 +

## 2021-05-25 ENCOUNTER — PROCEDURE VISIT (OUTPATIENT)
Dept: AUDIOLOGY | Age: 26
End: 2021-05-25
Payer: COMMERCIAL

## 2021-05-25 ENCOUNTER — OFFICE VISIT (OUTPATIENT)
Dept: ENT CLINIC | Age: 26
End: 2021-05-25
Payer: COMMERCIAL

## 2021-05-25 VITALS
DIASTOLIC BLOOD PRESSURE: 81 MMHG | HEIGHT: 60 IN | RESPIRATION RATE: 18 BRPM | WEIGHT: 183 LBS | SYSTOLIC BLOOD PRESSURE: 127 MMHG | OXYGEN SATURATION: 99 % | HEART RATE: 98 BPM | TEMPERATURE: 97.6 F | BODY MASS INDEX: 35.93 KG/M2

## 2021-05-25 DIAGNOSIS — H92.02 EAR PAIN, LEFT: ICD-10-CM

## 2021-05-25 DIAGNOSIS — H93.8X2 EAR PRESSURE, LEFT: ICD-10-CM

## 2021-05-25 DIAGNOSIS — J32.0 MAXILLARY SINUSITIS, UNSPECIFIED CHRONICITY: Primary | ICD-10-CM

## 2021-05-25 DIAGNOSIS — R42 DIZZINESS AND GIDDINESS: ICD-10-CM

## 2021-05-25 DIAGNOSIS — Z01.10 ENCOUNTER FOR EXAMINATION OF EARS AND HEARING WITHOUT ABNORMAL FINDINGS: ICD-10-CM

## 2021-05-25 DIAGNOSIS — H92.02 OTALGIA, LEFT EAR: Primary | ICD-10-CM

## 2021-05-25 PROCEDURE — 92556 SPEECH AUDIOMETRY COMPLETE: CPT | Performed by: AUDIOLOGIST

## 2021-05-25 PROCEDURE — G8428 CUR MEDS NOT DOCUMENT: HCPCS | Performed by: OTOLARYNGOLOGY

## 2021-05-25 PROCEDURE — 92567 TYMPANOMETRY: CPT | Performed by: AUDIOLOGIST

## 2021-05-25 PROCEDURE — 1036F TOBACCO NON-USER: CPT | Performed by: OTOLARYNGOLOGY

## 2021-05-25 PROCEDURE — G8417 CALC BMI ABV UP PARAM F/U: HCPCS | Performed by: OTOLARYNGOLOGY

## 2021-05-25 PROCEDURE — 92552 PURE TONE AUDIOMETRY AIR: CPT | Performed by: AUDIOLOGIST

## 2021-05-25 PROCEDURE — 99203 OFFICE O/P NEW LOW 30 MIN: CPT | Performed by: OTOLARYNGOLOGY

## 2021-05-25 ASSESSMENT — ENCOUNTER SYMPTOMS
PHOTOPHOBIA: 0
NAUSEA: 0
EYE ITCHING: 0
SHORTNESS OF BREATH: 0
VOICE CHANGE: 0
COLOR CHANGE: 0
FACIAL SWELLING: 0
SINUS PRESSURE: 1
VOMITING: 0
EYE DISCHARGE: 0
BACK PAIN: 0
TROUBLE SWALLOWING: 0
BLOOD IN STOOL: 0
CONSTIPATION: 0
COUGH: 0
SORE THROAT: 0
STRIDOR: 0
WHEEZING: 0
CHOKING: 0
RHINORRHEA: 0
SINUS PAIN: 0
DIARRHEA: 0

## 2021-05-25 NOTE — Clinical Note
Dr. Corinna Bran,    Please see note from this patient's audiogram from today. Please let me know if there is anything further you need.       Erwin Soto [address] [place]  Audiologist

## 2021-05-25 NOTE — PROGRESS NOTES
[facility]  [address]  [facility], [address] [place] [address]  P: [phone]  F: [phone]       Patient     Vic Hargrove  1995    ChiefComplaint     Chief Complaint   Patient presents with    Ear Problem     Poss L ear infection        History of Present Illness     Vic Hargrove is a pleasant 27 y.o. female referred by her primary care physician for possible eustachian tube dysfunction, sinusitis. Patient has been suffering from migraine symptoms, predominantly left-sided for the past few months. She has been on Topamax 25 mg twice daily now. Since initiating this therapy, her left-sided ear pain, phonophobia and facial paresthesias have improved. Additionally her headaches have improved as well. However she does have persistent left-sided facial pressure. This does not resolve with Imitrex or her Topamax. She had a recent MRI of her brain which revealed possible left maxillary sinusitis. She does not have any significant history of sinus disease otherwise. She denies any change in her hearing. She did have previous otalgia that was relatively significant, however today it is better. Denies otorrhea, tinnitus, spinning sensation. She did undergo audiogram today which was normal.  Denies significant difficulty breathing through her nose.       Past Medical History     Past Medical History:   Diagnosis Date    Anxiety     Bipolar 2 disorder ([address]     Borderline personality disorder ([address]     Depression     H/O drug abuse ([address]     hx of heroin/cocaine    IBS (irritable bowel syndrome)     Obese        Past Surgical History     Past Surgical History:   Procedure Laterality Date    WISDOM TOOTH EXTRACTION         Family History     Family History   Problem Relation Age of Onset    Other Maternal Grandmother         demetia    Heart Disease Maternal Grandmother     High Cholesterol Maternal Grandfather     Diabetes Maternal Grandfather     High Blood Pressure Maternal Grandfather     Heart Disease Maternal Grandfather        Social History     Social History     Socioeconomic History    Marital status: Life Partner     Spouse name: Not on file    Number of children: Not on file    Years of education: Not on file    Highest education level: Not on file   Occupational History     Comment: Refused to answer   Tobacco Use    Smoking status: Never Smoker    Smokeless tobacco: Never Used   Substance and Sexual Activity    Alcohol use: Yes     Comment: socially    Drug use: Yes     Types: Cocaine, IV     Comment: heroin-snort    Sexual activity: Yes     Partners: Male   Other Topics Concern    Not on file   Social History Narrative    Not on file     Social Determinants of Health     Financial Resource Strain: Low Risk     Difficulty of Paying Living Expenses: Not hard at all   Food Insecurity: No Food Insecurity    Worried About [address] in the Last Year: Never true    [address] in the Last Year: Never true   Transportation Needs: No Transportation Needs    Lack of Transportation (Medical): No    Lack of Transportation (Non-Medical): No   Physical Activity:     Days of Exercise per Week:     Minutes of Exercise per Session:    Stress:     Feeling of Stress :    Social Connections:     Frequency of Communication with Friends and Family:     Frequency of Social Gatherings with Friends and Family:     Attends [employer] Services:     Active Member of Clubs or Organizations:     Attends Club or Organization Meetings:     Marital Status:    Intimate Partner Violence:     Fear of Current or Ex-Partner:     Emotionally Abused:     Physically Abused:     Sexually Abused:         Allergies     Allergies   Allergen Reactions    Nickel Rash     Skin began to excoriate    Lexapro [Escitalopram Oxalate]      Jittery and could not sleep       Medications     Current Outpatient Medications   Medication Sig Dispense Refill    topiramate Pulse: 98   Resp: 18   Temp: 97.6 °F (36.4 °C)   SpO2: 99%       Physical Exam  Constitutional:       Appearance: She is well-developed. HENT:      Head: Normocephalic and atraumatic. Not macrocephalic and not microcephalic. No raccoon eyes, Morillo's sign, abrasion, contusion, right periorbital erythema, left periorbital erythema or laceration. Hair is normal.      Jaw: No trismus. Right Ear: Hearing, tympanic membrane and external ear normal. No decreased hearing noted. No drainage, swelling or tenderness. No middle ear effusion. No mastoid tenderness. Tympanic membrane is not perforated, retracted or bulging. Tympanic membrane has normal mobility. Left Ear: Hearing, tympanic membrane and external ear normal. No decreased hearing noted. No drainage, swelling or tenderness. No middle ear effusion. No mastoid tenderness. Tympanic membrane is not perforated, retracted or bulging. Tympanic membrane has normal mobility. Nose: No nasal deformity, septal deviation, laceration, mucosal edema or rhinorrhea. Right Sinus: No maxillary sinus tenderness or frontal sinus tenderness. Left Sinus: No maxillary sinus tenderness or frontal sinus tenderness. Mouth/Throat:      Mouth: Mucous membranes are not pale, not dry and not cyanotic. No lacerations or oral lesions. Dentition: Normal dentition. No dental caries or dental abscesses. Pharynx: Uvula midline. No oropharyngeal exudate, posterior oropharyngeal erythema or uvula swelling. Tonsils: No tonsillar abscesses. Eyes:      General: Lids are normal.         Right eye: No discharge. Left eye: No discharge. Extraocular Movements:      Right eye: Normal extraocular motion and no nystagmus. Left eye: Normal extraocular motion and no nystagmus. Conjunctiva/sclera:      Right eye: No chemosis or exudate. Left eye: No chemosis or exudate. Neck:      Thyroid: No thyroid mass or thyromegaly.       Vascular: Audiogram is still performed today which reveals normal hearing bilaterally, type a tympanograms, excellent word recognition scores. I do not suspect any concern for eustachian tube dysfunction at this time. Return for after ct. Portions of this note were dictated using Dragon.  There may be linguistic errors secondary to the use of this program.

## 2021-05-25 NOTE — PATIENT INSTRUCTIONS
Good Communication Strategies    Communication can be challenging for anyone, but can be especially difficult for those with some degree of hearing loss. While we may not be able to control every factor that may lead to difficulty with communication, there are Good Communication Strategies that we can all use in our day-to-day lives. Communication takes both parties working together for it to be successful. Tips as a Listener:   1. Control your environment. It is important to limit the amount of background noise in the room when possible. You should also consider having a good light source in the room to best see the other person. 2. Ask for clarification. Instead of saying \"What?\", you can use parts of what you heard to make a new question. For example, if you heard the word \"Thursday\" but not the rest of the week, you may ask \"What was that about Thursday? \" or \"What did you want to do Thursday? \". This shows the person talking that you are listening and will help them better explain what they are saying. 3. Be an advocate for yourself. If you are hearing but not understanding, tell the other person \"I can hear you, but I need you to slow down when you speak. \"  Or if someone is facing the other direction, say \"I cannot hear you when you are not looking at me when we talk. \"       Tips as a Talker:   - Sit or stand 3 to 6 feet away to maximize audibility         -- It is unrealistic to believe someone else will fully hear your message if you are speaking from across the room or in a different room in the house   - Stay at eye level to help with visual cues   - Make sure you have the persons attention before speaking   - Use facial expressions and gestures to accentuate your message   - Raise your voice slightly (do not scream)   - Speak slowly and distinctly   - Use short, simple sentences   - Rephrase your words if the person is having a hard time understanding you    - To avoid distortion, dont speak directly into a persons ear      Some additional items that may be helpful:   - Remain patient - this is important for both parties   - Write down items that still cannot be heard/understood. You may write with pen/paper or consider typing/texting on a cell phone or smart device. - If background noise is unavoidable, try to keep yourself in a good position in the room. By sitting at a marshall on the side of the restaurant (preferably a corner), it will be easier to communicate than if you were sitting at a table in the middle with background noise surrounding you. Keep yourself positioned away from music speakers or heavy foot traffic.   - If you have difficulty with the television, consider these options:      -- Use closed-captioning, which is a setting you can turn on that displays the spoken words in a written form on the screen. There may be a slight delay, but this can help fill in missing information. This can be especially helpful when watching programs with accented speech. -- Consider use of a sound bar or speakers that come from the front of the TV. With modern flat screen TVs, many of them have speakers that come out of the back of the device, which makes sound bounce off the wall behind it, then go into the room. Sound bars can allow the sound to go straight in your direction and can improve sound quality. -- Consider ear level devices to help improve the volume and/or sound quality of the program.  There are devices that work like headphones that you can adjust the volume for your ears while others can have the volume at a more comfortable level, such as \"TV Ears\". Most hearing aids have devices that allow them to connect directly to the TV and improve sound quality. Tinnitus: Overview and Management Strategies          Many people have some ringing sounds in their ears once in a while. You may hear a roar, a hiss, a tinkle, or a buzz. The sound usually lasts only a few minutes. If it goes on all the time, you may have tinnitus. Tinnitus is usually caused by long-term exposure to loud noise. This damages the nerves in the inner ear. It can occur with all types of hearing loss. It may be a symptom of almost any ear problem. Tinnitus may be caused by a buildup of earwax. Or, it may be caused by ear infections or certain medicines (especially antibiotics or large amounts of aspirin). You can also hear noises in your ears because of an injury to the ears, drinking too much alcohol or caffeine, or a medical condition. Other conditions may also contribute to tinnitus, including: head and neck trauma, temporomandibular joint disorder (TMJ), sinus pressure and barometric trauma, traumatic brain injury, metabolic disorders, autoimmune disorders, stress, and high blood pressure. You may need tests to evaluate your hearing and to find causes of long-lasting tinnitus. Your doctor may suggest one or more treatments to help you cope with the tinnitus. You can also do things at home to help reduce symptoms. Follow-up care is a key part of your treatment and safety. Be sure to make and go to all appointments, and call your doctor if you are having problems. It's also a good idea to know your test results and keep a list of the medicines you take. How can you care for yourself at home? · Limit or cut out alcohol, caffeine, and sodium. They can make your symptoms worse. · Do not smoke or use other tobacco products. Nicotine reduces blood flow to the ear and makes tinnitus worse. If you need help quitting, talk to your doctor about stop-smoking programs and medicines. These can increase your chances of quitting for good. · Talk to your doctor about whether to stop taking aspirin and similar products such as ibuprofen or naproxen. · Get exercise often. It can help improve blood flow to the ear. Ways to manage/cope with tinnitus  Some tinnitus may last a long time.  To manage your tinnitus, · You develop other symptoms. These may include hearing loss (or worse hearing loss), balance problems, dizziness, nausea, or vomiting. Watch closely for changes in your health, and be sure to contact your doctor if:    · Your tinnitus moves from both ears to one ear. · Your hearing loss gets worse within 1 day after an ear injury. · Your tinnitus or hearing loss does not get better within 1 week after an ear injury. · Your tinnitus bothers you enough that you want to take medicines to help you cope with it. If you notice changes in your tinnitus and/or your hearing, it is recommended that you have your hearing tested by your audiologist and to follow-up with your physician that manages your hearing loss (such as your ENT or Primary Care doctor). Dizziness: Care Instructions  Your Care Instructions  Dizziness is the feeling of unsteadiness or fuzziness in your head. It is different than having vertigo, which is a feeling that the room is spinning or that you are moving or falling. It is also different from lightheadedness, which is the feeling that you are about to faint. It can be hard to know what causes dizziness. Some people feel dizzy when they have migraine headaches. Sometimes bouts of flu can make you feel dizzy. Some medical conditions, such as heart problems or high blood pressure, can make you feel dizzy. Many medicines can cause dizziness, including medicines for high blood pressure, pain, or anxiety. If a medicine causes your symptoms, your doctor may recommend that you stop or change the medicine. If it is a problem with your heart, you may need medicine to help your heart work better. If there is no clear reason for your symptoms, your doctor may suggest watching and waiting for a while to see if the dizziness goes away on its own. Follow-up care is a key part of your treatment and safety.      Be sure to make and go to all appointments, and call your doctor if you are having problems. It's also a good idea to know your test results and keep a list of the medicines you take. How can you care for yourself at home? · If your doctor recommends or prescribes medicine, take it exactly as directed. Call your doctor if you think you are having a problem with your medicine. · Do not drive while you feel dizzy. · Try to prevent falls. Steps you can take include:  ? Using nonskid mats, adding grab bars near the tub, and using night-lights. ? Clearing your home so that walkways are free of anything you might trip on.  ? Letting family and friends know that you have been feeling dizzy. This will help them know how to help you. When should you call for help? Call 911 anytime you think you may need emergency care. For example, call if:    · You passed out (lost consciousness). · You have dizziness along with symptoms of a heart attack. These may include:  ? Chest pain or pressure, or a strange feeling in the chest.  ? Sweating. ? Shortness of breath. ? Nausea or vomiting. ? Pain, pressure, or a strange feeling in the back, neck, jaw, or upper belly or in one or both shoulders or arms. ? Lightheadedness or sudden weakness. ? A fast or irregular heartbeat. · You have symptoms of a stroke. These may include:  ? Sudden numbness, tingling, weakness, or loss of movement in your face, arm, or leg, especially on only one side of your body. ? Sudden vision changes. ? Sudden trouble speaking. ? Sudden confusion or trouble understanding simple statements. ? Sudden problems with walking or balance. ? A sudden, severe headache that is different from past headaches. Call your doctor now or seek immediate medical care if:    · You feel dizzy and have a fever, headache, or ringing in your ears. · You have new or increased nausea and vomiting. · Your dizziness does not go away or comes back.     Watch closely for changes in your health, and be sure to contact your doctor if:    · You do not get better as expected. Where can you learn more? Go to https://chpepiceweb.[email or web address]. org and sign in to your [email or web address] account. Enter U354 in the [employer] box to learn more about \"Dizziness: Care Instructions. \"     If you do not have an account, please click on the \"Sign Up Now\" link. Current as of: September 23, 2018  Content Version: 11.9  © 1925-9601 [employer]. Care instructions adapted under license by [facility] ([facility]). If you have questions about a medical condition or this instruction, always ask your healthcare professional. [address] any warranty or liability for your use of this information.

## 2021-05-29 DIAGNOSIS — M25.512 CHRONIC LEFT SHOULDER PAIN: ICD-10-CM

## 2021-05-29 DIAGNOSIS — G89.29 CHRONIC LEFT SHOULDER PAIN: ICD-10-CM

## 2021-05-29 DIAGNOSIS — G43.819 OTHER MIGRAINE WITHOUT STATUS MIGRAINOSUS, INTRACTABLE: ICD-10-CM

## 2021-06-01 ENCOUNTER — HOSPITAL ENCOUNTER (OUTPATIENT)
Dept: CT IMAGING | Age: 26
Discharge: HOME | End: 2021-06-01
Payer: COMMERCIAL

## 2021-06-01 DIAGNOSIS — J32.0 MAXILLARY SINUSITIS, UNSPECIFIED CHRONICITY: ICD-10-CM

## 2021-06-01 PROCEDURE — 70486 CT MAXILLOFACIAL W/O DYE: CPT

## 2021-06-01 RX ORDER — CYCLOBENZAPRINE HCL 5 MG
5 TABLET ORAL NIGHTLY PRN
Qty: 30 TABLET | Refills: 0 | Status: SHIPPED | OUTPATIENT
Start: 2021-06-01 | End: 2021-06-11

## 2021-06-01 RX ORDER — TOPIRAMATE 25 MG/1
TABLET ORAL
Qty: 90 TABLET | Refills: 0 | Status: SHIPPED | OUTPATIENT
Start: 2021-06-01 | End: 2021-06-15 | Stop reason: SDUPTHER

## 2021-06-01 NOTE — TELEPHONE ENCOUNTER
Medication:   Requested Prescriptions     Pending Prescriptions Disp Refills    topiramate (TOPAMAX) 25 MG tablet [Pharmacy Med Name: TOPIRAMATE 25 MG TABLET] 90 tablet      Sig: TAKE 1 TABLET BY MOUTH EVERY DAY AT NIGHT    cyclobenzaprine (FLEXERIL) 5 MG tablet [Pharmacy Med Name: CYCLOBENZAPRINE 5 MG TABLET] 30 tablet 0     Sig: TAKE 1 TABLET BY MOUTH NIGHTLY AS NEEDED FOR MUSCLE SPASMS     Last Filled:  5/18 & 5/20    Last appt: 4/26/2021   Next appt: 7/26/2021

## 2021-06-02 DIAGNOSIS — J32.0 CHRONIC MAXILLARY SINUSITIS: Primary | ICD-10-CM

## 2021-06-02 RX ORDER — AMOXICILLIN AND CLAVULANATE POTASSIUM 875; 125 MG/1; MG/1
1 TABLET, FILM COATED ORAL 2 TIMES DAILY
Qty: 42 TABLET | Refills: 0 | Status: SHIPPED | OUTPATIENT
Start: 2021-06-02 | End: 2021-06-23

## 2021-06-03 ENCOUNTER — TELEPHONE (OUTPATIENT)
Dept: ENT CLINIC | Age: 26
End: 2021-06-03

## 2021-06-03 NOTE — TELEPHONE ENCOUNTER
----- Message from Quay Cranker, DO sent at 6/2/2021  1:58 PM EDT -----  Please have patient follow up 3-4 weeks to discuss CT, symptoms, and allergy testing.

## 2021-06-03 NOTE — TELEPHONE ENCOUNTER
Spoke with patient regarding message and made an appointment for her on 6-24 and will ask Giovanna Hills to call her regarding allergies

## 2021-06-04 DIAGNOSIS — J32.0 CHRONIC MAXILLARY SINUSITIS: Primary | ICD-10-CM

## 2021-06-04 RX ORDER — FLUCONAZOLE 100 MG/1
100 TABLET ORAL DAILY
Qty: 2 TABLET | Refills: 0 | Status: SHIPPED | OUTPATIENT
Start: 2021-06-04 | End: 2021-06-06

## 2021-06-15 ENCOUNTER — OFFICE VISIT (OUTPATIENT)
Dept: PRIMARY CARE CLINIC | Age: 26
End: 2021-06-15
Payer: COMMERCIAL

## 2021-06-15 ENCOUNTER — PATIENT MESSAGE (OUTPATIENT)
Dept: PRIMARY CARE CLINIC | Age: 26
End: 2021-06-15

## 2021-06-15 VITALS
HEART RATE: 107 BPM | OXYGEN SATURATION: 98 % | SYSTOLIC BLOOD PRESSURE: 120 MMHG | HEIGHT: 60 IN | WEIGHT: 181 LBS | BODY MASS INDEX: 35.53 KG/M2 | DIASTOLIC BLOOD PRESSURE: 78 MMHG | TEMPERATURE: 98.2 F

## 2021-06-15 DIAGNOSIS — G43.819 OTHER MIGRAINE WITHOUT STATUS MIGRAINOSUS, INTRACTABLE: ICD-10-CM

## 2021-06-15 PROCEDURE — 99214 OFFICE O/P EST MOD 30 MIN: CPT | Performed by: FAMILY MEDICINE

## 2021-06-15 PROCEDURE — 1036F TOBACCO NON-USER: CPT | Performed by: FAMILY MEDICINE

## 2021-06-15 PROCEDURE — G8417 CALC BMI ABV UP PARAM F/U: HCPCS | Performed by: FAMILY MEDICINE

## 2021-06-15 PROCEDURE — G8427 DOCREV CUR MEDS BY ELIG CLIN: HCPCS | Performed by: FAMILY MEDICINE

## 2021-06-15 RX ORDER — TOPIRAMATE 50 MG/1
50 TABLET, FILM COATED ORAL 2 TIMES DAILY
Qty: 60 TABLET | Refills: 2 | Status: SHIPPED | OUTPATIENT
Start: 2021-06-15 | End: 2021-08-24 | Stop reason: ALTCHOICE

## 2021-06-15 ASSESSMENT — ENCOUNTER SYMPTOMS
EYES NEGATIVE: 1
RESPIRATORY NEGATIVE: 1
GASTROINTESTINAL NEGATIVE: 1

## 2021-06-15 NOTE — PROGRESS NOTES
SUBJECTIVE:  Patient ID: Neto Anglin is a 27 y.o. y.o. female         Headache: Patient presents for follow up onheadache, her migraine headache is getting worse Maxalt was not helping much Imitrex was sent to the wrong pharmacy could not get it symptoms began about several months ago. Generally, the headaches last about a few hours and occur frequent, daily. The headache do not seem to be related to any time of the day. The headaches are usually sharp and throbbing and are right-sided unilateral, left-sided unilateral in location. The patient rates her most severe headaches a 8 on a scale from 1 to 10. Recently, the headaches significantly improved on the Topamax still to having 3-4 headaches. School attendance or other daily activities are affected by the headaches. Precipitating factors include none which have been determined. The headaches are usually not preceded by an aura. Associated neurologic symptoms which are present include: decreased physical activity. The patient denies depression, loss of balance, muscle weakness, numbness of extremities, vomiting in the early morning and worsening school/work performance. Other associated symptoms include: nothing pertinent. Symptoms which are not present include: conjunctivitis, diarrhea, ear pulling, fever, hoarseness, nasal congestion, nausea, photophobia and rash. Tolerating Topamax well with no side effect  She wants to try a higher dose  She is been having issue sinus on the left side she seen by ENT he is on antibiotic as a trial to see if improvement if not may need surgery according to patient.     Past Medical History:   Diagnosis Date    Anxiety     Bipolar 2 disorder ([address]     Borderline personality disorder ([address]     Depression     H/O drug abuse (HCC)     hx of heroin/cocaine    IBS (irritable bowel syndrome)     Obese       Past Surgical History:   Procedure Laterality Date    WISDOM TOOTH EXTRACTION       Family History   Problem Relation Age of Onset    Other Maternal Grandmother         demetia    Heart Disease Maternal Grandmother     High Cholesterol Maternal Grandfather     Diabetes Maternal Grandfather     High Blood Pressure Maternal Grandfather     Heart Disease Maternal Grandfather      Social History     Socioeconomic History    Marital status: Life Partner     Spouse name: None    Number of children: None    Years of education: None    Highest education level: None   Occupational History     Comment: Refused to answer   Tobacco Use    Smoking status: Never Smoker    Smokeless tobacco: Never Used   Substance and Sexual Activity    Alcohol use: Yes     Comment: socially    Drug use: Yes     Types: Cocaine, IV     Comment: heroin-snort    Sexual activity: Yes     Partners: Male   Other Topics Concern    None   Social History Narrative    None     Social Determinants of Health     Financial Resource Strain: Low Risk     Difficulty of Paying Living Expenses: Not hard at all   Food Insecurity: No Food Insecurity    Worried About Running Out of Food in the Last Year: Never true    Mike of Food in the Last Year: Never true   Transportation Needs: No Transportation Needs    Lack of Transportation (Medical): No    Lack of Transportation (Non-Medical):  No   Physical Activity:     Days of Exercise per Week:     Minutes of Exercise per Session:    Stress:     Feeling of Stress :    Social Connections:     Frequency of Communication with Friends and Family:     Frequency of Social Gatherings with Friends and Family:     Attends Cheondoism Services:     Active Member of Clubs or Organizations:     Attends Club or Organization Meetings:     Marital Status:    Intimate Partner Violence:     Fear of Current or Ex-Partner:     Emotionally Abused:     Physically Abused:     Sexually Abused:      Current Outpatient Medications   Medication Sig Dispense Refill    amoxicillin-clavulanate (AUGMENTIN) 875-125 MG per tablet Take 1 tablet by mouth 2 times daily for 21 days 42 tablet 0    topiramate (TOPAMAX) 25 MG tablet TAKE 1 TABLET BY MOUTH EVERY DAY AT NIGHT 90 tablet 0    Fexofenadine-Pseudoephedrine (ALLEGRA-D 12 HOUR PO) Take 1 tablet by mouth daily      fluticasone (FLONASE) 50 MCG/ACT nasal spray SPRAY 2 SPRAYS INTO EACH NOSTRIL EVERY DAY 1 Bottle 1    metFORMIN (GLUCOPHAGE) 500 MG tablet Take 500 mg by mouth 2 times daily (with meals)       acyclovir (ZOVIRAX) 400 MG tablet Take 400 mg by mouth      ibuprofen (ADVIL;MOTRIN) 800 MG tablet Take 800 mg by mouth      SUMAtriptan (IMITREX) 100 MG tablet TAKE 1 TABLET BY MOUTH ONCE AS NEEDED FOR MIGRAINE 12 tablet 1     No current facility-administered medications for this visit. Allergies   Allergen Reactions    Nickel Rash     Skin began to excoriate    Lexapro [Escitalopram Oxalate]      Jittery and could not sleep       Review of Systems   Constitutional: Negative. HENT: Negative. Eyes: Negative. Respiratory: Negative. Cardiovascular: Negative. Gastrointestinal: Negative. Neurological: Positive for headaches. Negative for dizziness, tremors, seizures, syncope, facial asymmetry, speech difficulty, weakness, light-headedness and numbness. All other systems reviewed and are negative. OBJECTIVE:  /78 (Site: Left Upper Arm, Position: Sitting, Cuff Size: Small Adult)   Pulse 107   Temp 98.2 °F (36.8 °C)   Ht 5' (1.524 m)   Wt 181 lb (82.1 kg)   LMP 05/28/2021 (Exact Date)   SpO2 98%   BMI 35.35 kg/m²     Physical Exam  Vitals reviewed. Constitutional:       Appearance: Normal appearance. HENT:      Head: Normocephalic and atraumatic. Eyes:      General: No scleral icterus. Conjunctiva/sclera: Conjunctivae normal.   Neck:      Thyroid: No thyromegaly. Vascular: No JVD. Cardiovascular:      Rate and Rhythm: Normal rate and regular rhythm. Heart sounds: Normal heart sounds. No murmur heard. No friction rub.  No

## 2021-06-15 NOTE — TELEPHONE ENCOUNTER
From: Shahid Alert  To: Masood Hair MD  Sent: 6/15/2021 2:55 PM EDT  Subject: Prescription Question    Hi Dr. Biju Barrera! I just went to fill my weekly pill thing and saw I only have enough Topamax for the next couple days for the new dosage. Are you able to put that new prescription through to the [facility] pharmacy? Realized I went through the 90 days faster due to the first increase. Thank you!   Amada Woods

## 2021-06-22 DIAGNOSIS — H69.82 DYSFUNCTION OF LEFT EUSTACHIAN TUBE: ICD-10-CM

## 2021-06-22 RX ORDER — FLUTICASONE PROPIONATE 50 MCG
SPRAY, SUSPENSION (ML) NASAL
Qty: 1 BOTTLE | Refills: 1 | Status: SHIPPED | OUTPATIENT
Start: 2021-06-22 | End: 2021-07-06

## 2021-06-22 NOTE — TELEPHONE ENCOUNTER
Medication:   Requested Prescriptions     Pending Prescriptions Disp Refills    fluticasone (FLONASE) 50 MCG/ACT nasal spray [Pharmacy Med Name: FLUTICASONE PROP 50 MCG SPRAY] 1 Bottle 1     Sig: SPRAY 2 SPRAYS INTO EACH NOSTRIL EVERY DAY     Last Filled:  04/20/21    Last appt: 6/15/2021   Next appt: 8/24/2021    Last OARRS: No flowsheet data found.

## 2021-06-25 ENCOUNTER — OFFICE VISIT (OUTPATIENT)
Dept: ENT CLINIC | Age: 26
End: 2021-06-25
Payer: COMMERCIAL

## 2021-06-25 VITALS
WEIGHT: 179 LBS | TEMPERATURE: 98.3 F | DIASTOLIC BLOOD PRESSURE: 63 MMHG | HEIGHT: 60 IN | SYSTOLIC BLOOD PRESSURE: 115 MMHG | HEART RATE: 106 BPM | BODY MASS INDEX: 35.14 KG/M2

## 2021-06-25 DIAGNOSIS — J34.2 DNS (DEVIATED NASAL SEPTUM): ICD-10-CM

## 2021-06-25 DIAGNOSIS — J34.89 NASAL OBSTRUCTION: ICD-10-CM

## 2021-06-25 DIAGNOSIS — J32.0 CHRONIC MAXILLARY SINUSITIS: Primary | ICD-10-CM

## 2021-06-25 DIAGNOSIS — J34.3 NASAL TURBINATE HYPERTROPHY: ICD-10-CM

## 2021-06-25 DIAGNOSIS — J32.3 CHRONIC SPHENOIDAL SINUSITIS: ICD-10-CM

## 2021-06-25 PROCEDURE — G8427 DOCREV CUR MEDS BY ELIG CLIN: HCPCS | Performed by: OTOLARYNGOLOGY

## 2021-06-25 PROCEDURE — 99214 OFFICE O/P EST MOD 30 MIN: CPT | Performed by: OTOLARYNGOLOGY

## 2021-06-25 PROCEDURE — 1036F TOBACCO NON-USER: CPT | Performed by: OTOLARYNGOLOGY

## 2021-06-25 PROCEDURE — G8417 CALC BMI ABV UP PARAM F/U: HCPCS | Performed by: OTOLARYNGOLOGY

## 2021-06-25 ASSESSMENT — ENCOUNTER SYMPTOMS
TROUBLE SWALLOWING: 0
CHOKING: 0
DIARRHEA: 0
EYE REDNESS: 0
VOICE CHANGE: 0
NAUSEA: 0
SHORTNESS OF BREATH: 0
EYE ITCHING: 0
RHINORRHEA: 1
SORE THROAT: 0
EYE PAIN: 0
COUGH: 0
SINUS PRESSURE: 1
SINUS PAIN: 1
STRIDOR: 0
FACIAL SWELLING: 0
COLOR CHANGE: 0
PHOTOPHOBIA: 0

## 2021-06-25 NOTE — LETTER
[facility]    Surgery Schedule Request Form: 06/25/21  [address] [place] [address]      DATE OF SURGERY: 07-  TIME OF SURGERY:  10:00 am             CONF #: ____________________       Patient Information:    Patient name: Erin Chairez    YOB: 1995 Age/Sex:26 y.o./female    SS #:[id number]    Wt Readings from Last 1 Encounters:   06/25/21 179 lb (81.2 kg)       Telephone Information:   Mobile [phone]     Home [phone]     Surgeon & Procedure Information:     Lead surgeon: Torito Levin Co-Surgeon: elizabeth  Phone: [phone] Fax: [phone]  PCP: Willam Davila MD    Diagnosis: 1. Chronic maxillary sinusitis J32.0 2. Chronic sphenoid sinusitis   J32.3 3. Deviated nasal septum   J34.2 4. Bilateral turbinate hypertrophy  J34.3    Procedure name/CPT: Sphenoidotomy right (94868), Bilateral Anterior Ethmoidectomy (52504-95), Bilateral Inferior Turbinate Reduction (97669-24), Bilateral Maxillary Antrostomy (05673-80) and Septoplasty (58269) Nasal Scope (81380-92)    Procedure length: 1 hour Anesthesia: General     Covid vaccinated: NO (pt instructed to get testing done on July 21, 2021    Special Equipment: yes - Sinus tray, nasal tray, sinus shaver, tower    Patient Status:     Primary Payor Plan: [facility]  Member ID: [id number]   [address] [place] [address] Drive name: Erin Chairez    [] Implement attached clinical orders for patient.       Electronically signed by Cece Pichardo DO on 6/25/2021 at 3:21 PM

## 2021-06-25 NOTE — PROGRESS NOTES
tablet Take 1 tablet by mouth 2 times daily 60 tablet 2    Fexofenadine-Pseudoephedrine (ALLEGRA-D 12 HOUR PO) Take 1 tablet by mouth daily      metFORMIN (GLUCOPHAGE) 500 MG tablet Take 500 mg by mouth 2 times daily (with meals)       acyclovir (ZOVIRAX) 400 MG tablet Take 400 mg by mouth      ibuprofen (ADVIL;MOTRIN) 800 MG tablet Take 800 mg by mouth      SUMAtriptan (IMITREX) 100 MG tablet TAKE 1 TABLET BY MOUTH ONCE AS NEEDED FOR MIGRAINE 12 tablet 1     No current facility-administered medications for this visit. Review of Systems     Review of Systems   Constitutional: Negative for chills, fatigue and fever. HENT: Positive for congestion, postnasal drip, rhinorrhea, sinus pressure and sinus pain. Negative for ear discharge, ear pain, facial swelling, hearing loss, nosebleeds, sneezing, sore throat, tinnitus, trouble swallowing and voice change. Eyes: Negative for photophobia, pain, redness, itching and visual disturbance. Respiratory: Negative for cough, choking, shortness of breath and stridor. Gastrointestinal: Negative for diarrhea and nausea. Musculoskeletal: Negative for neck pain and neck stiffness. Skin: Negative for color change and rash. Neurological: Negative for dizziness, facial asymmetry and light-headedness. Hematological: Negative for adenopathy. Psychiatric/Behavioral: Negative for agitation and confusion. PhysicalExam     Vitals:    06/25/21 1459   BP: 115/63   Pulse: 106   Temp: 98.3 °F (36.8 °C)       Physical Exam  Constitutional:       Appearance: She is well-developed. HENT:      Head: Normocephalic and atraumatic. Jaw: No trismus. Right Ear: Tympanic membrane, ear canal and external ear normal. No drainage. No middle ear effusion. Tympanic membrane is not perforated. Left Ear: Tympanic membrane, ear canal and external ear normal. No drainage. No middle ear effusion. Tympanic membrane is not perforated.       Nose: Septal deviation (2+right) present. No mucosal edema or rhinorrhea. Right Turbinates: Enlarged and swollen. Left Turbinates: Enlarged and swollen. Mouth/Throat:      Dentition: Normal dentition. Pharynx: Uvula midline. No oropharyngeal exudate. Eyes:      General: No scleral icterus. Right eye: No discharge. Left eye: No discharge. Pupils: Pupils are equal, round, and reactive to light. Neck:      Thyroid: No thyromegaly. Trachea: Phonation normal. No tracheal deviation. Pulmonary:      Effort: Pulmonary effort is normal. No respiratory distress. Breath sounds: No stridor. Musculoskeletal:      Cervical back: Neck supple. Lymphadenopathy:      Cervical: No cervical adenopathy. Skin:     General: Skin is warm and dry. Neurological:      Mental Status: She is alert and oriented to person, place, and time. Cranial Nerves: No cranial nerve deficit. Psychiatric:         Behavior: Behavior normal.           Procedure           Assessment and Plan     1. Chronic maxillary sinusitis  The patient completed a 3-week course of antibiotics. She is experiencing persistent symptoms of facial pressure, facial pain, tooth pain, congestion, postnasal drainage, bubbling from her sinus cavities. CT of the sinus images and report reviewed. Sinus CT reveals mild to moderate mucosal thickening left greater than right maxillary sinus, mild mucosal thickening right sphenoid sinus, septal deviation to the right, bilateral turbinate hypertrophy. Patient has been on maximum medical therapy including an adequate course of nasal steroid sprays, antihistamines, decongestants, nasal saline. Given that she is experiencing persistent symptoms, I recommend endoscopic sinus surgery with bilateral maxillary antrostomy, bilateral anterior ethmoidectomy, right sphenoidotomy, septoplasty and turbinate reduction. Risk, benefits and alternatives of the procedure discussed with the patient.   Risk include, but not limited to bleeding, infection, pain, septal perforation, septal hematoma, synechia formation, damage to the orbit and blindness, damage to the skull base and CSF leak. Patient understands the risk and is willing to proceed. The patient was counseled at length about the risks of osvaldo Covid-19 during their perioperative period and any recovery window from their procedure. The patient was made aware that osvaldo Covid-19  may worsen their prognosis for recovering from their procedure  and lend to a higher morbidity and/or mortality risk. All material risks, benefits, and reasonable alternatives including postponing the procedure were discussed. The patient does wish to proceed with the procedure at this time. 2. Chronic sphenoidal sinusitis      3. DNS (deviated nasal septum)      4. Nasal turbinate hypertrophy      5. Nasal obstruction        Return for 1 week post op. Portions of this note were dictated using Dragon.  There may be linguistic errors secondary to the use of this program.

## 2021-06-28 ENCOUNTER — TELEPHONE (OUTPATIENT)
Dept: ENT CLINIC | Age: 26
End: 2021-06-28

## 2021-07-01 ENCOUNTER — PATIENT MESSAGE (OUTPATIENT)
Dept: ENT CLINIC | Age: 26
End: 2021-07-01

## 2021-07-06 ENCOUNTER — OFFICE VISIT (OUTPATIENT)
Dept: PRIMARY CARE CLINIC | Age: 26
End: 2021-07-06
Payer: COMMERCIAL

## 2021-07-06 VITALS
OXYGEN SATURATION: 99 % | DIASTOLIC BLOOD PRESSURE: 80 MMHG | BODY MASS INDEX: 34.71 KG/M2 | SYSTOLIC BLOOD PRESSURE: 118 MMHG | HEART RATE: 80 BPM | WEIGHT: 176.8 LBS | HEIGHT: 60 IN | TEMPERATURE: 98.1 F

## 2021-07-06 DIAGNOSIS — H69.82 DYSFUNCTION OF LEFT EUSTACHIAN TUBE: ICD-10-CM

## 2021-07-06 DIAGNOSIS — G43.819 OTHER MIGRAINE WITHOUT STATUS MIGRAINOSUS, INTRACTABLE: ICD-10-CM

## 2021-07-06 DIAGNOSIS — Z01.818 PRE-OP EXAMINATION: Primary | ICD-10-CM

## 2021-07-06 DIAGNOSIS — Z01.818 PRE-OP EXAMINATION: ICD-10-CM

## 2021-07-06 DIAGNOSIS — J32.0 MAXILLARY SINUSITIS, UNSPECIFIED CHRONICITY: ICD-10-CM

## 2021-07-06 LAB
ALBUMIN SERPL-MCNC: 4.7 G/DL (ref 3.4–5)
ALP BLD-CCNC: 91 U/L (ref 40–129)
ALT SERPL-CCNC: 16 U/L (ref 10–40)
ANION GAP SERPL CALCULATED.3IONS-SCNC: 14 MMOL/L (ref 3–16)
AST SERPL-CCNC: 13 U/L (ref 15–37)
BILIRUB SERPL-MCNC: 0.3 MG/DL (ref 0–1)
BILIRUBIN DIRECT: <0.2 MG/DL (ref 0–0.3)
BILIRUBIN, INDIRECT: ABNORMAL MG/DL (ref 0–1)
BUN BLDV-MCNC: 8 MG/DL (ref 7–20)
CALCIUM SERPL-MCNC: 9.3 MG/DL (ref 8.3–10.6)
CHLORIDE BLD-SCNC: 106 MMOL/L (ref 99–110)
CO2: 18 MMOL/L (ref 21–32)
CREAT SERPL-MCNC: 0.6 MG/DL (ref 0.6–1.1)
GFR AFRICAN AMERICAN: >60
GFR NON-AFRICAN AMERICAN: >60
GLUCOSE BLD-MCNC: 80 MG/DL (ref 70–99)
HCT VFR BLD CALC: 38.6 % (ref 36–48)
HEMOGLOBIN: 12.9 G/DL (ref 12–16)
MCH RBC QN AUTO: 30.2 PG (ref 26–34)
MCHC RBC AUTO-ENTMCNC: 33.6 G/DL (ref 31–36)
MCV RBC AUTO: 89.9 FL (ref 80–100)
PDW BLD-RTO: 13.7 % (ref 12.4–15.4)
PLATELET # BLD: 331 K/UL (ref 135–450)
PMV BLD AUTO: 9.1 FL (ref 5–10.5)
POTASSIUM SERPL-SCNC: 3.9 MMOL/L (ref 3.5–5.1)
RBC # BLD: 4.29 M/UL (ref 4–5.2)
SODIUM BLD-SCNC: 138 MMOL/L (ref 136–145)
TOTAL PROTEIN: 7.8 G/DL (ref 6.4–8.2)
WBC # BLD: 7.2 K/UL (ref 4–11)

## 2021-07-06 PROCEDURE — G8427 DOCREV CUR MEDS BY ELIG CLIN: HCPCS | Performed by: FAMILY MEDICINE

## 2021-07-06 PROCEDURE — 99243 OFF/OP CNSLTJ NEW/EST LOW 30: CPT | Performed by: FAMILY MEDICINE

## 2021-07-06 PROCEDURE — G8417 CALC BMI ABV UP PARAM F/U: HCPCS | Performed by: FAMILY MEDICINE

## 2021-07-06 SDOH — ECONOMIC STABILITY: FOOD INSECURITY: WITHIN THE PAST 12 MONTHS, THE FOOD YOU BOUGHT JUST DIDN'T LAST AND YOU DIDN'T HAVE MONEY TO GET MORE.: NEVER TRUE

## 2021-07-06 SDOH — ECONOMIC STABILITY: FOOD INSECURITY: WITHIN THE PAST 12 MONTHS, YOU WORRIED THAT YOUR FOOD WOULD RUN OUT BEFORE YOU GOT MONEY TO BUY MORE.: NEVER TRUE

## 2021-07-06 SDOH — ECONOMIC STABILITY: INCOME INSECURITY: IN THE LAST 12 MONTHS, WAS THERE A TIME WHEN YOU WERE NOT ABLE TO PAY THE MORTGAGE OR RENT ON TIME?: NO

## 2021-07-06 SDOH — HEALTH STABILITY: PHYSICAL HEALTH: ON AVERAGE, HOW MANY DAYS PER WEEK DO YOU ENGAGE IN MODERATE TO STRENUOUS EXERCISE (LIKE A BRISK WALK)?: 6 DAYS

## 2021-07-06 SDOH — ECONOMIC STABILITY: HOUSING INSECURITY
IN THE LAST 12 MONTHS, WAS THERE A TIME WHEN YOU DID NOT HAVE A STEADY PLACE TO SLEEP OR SLEPT IN A SHELTER (INCLUDING NOW)?: NO

## 2021-07-06 SDOH — ECONOMIC STABILITY: HOUSING INSECURITY: IN THE LAST 12 MONTHS, HOW MANY PLACES HAVE YOU LIVED?: 1

## 2021-07-06 SDOH — HEALTH STABILITY: PHYSICAL HEALTH: ON AVERAGE, HOW MANY MINUTES DO YOU ENGAGE IN EXERCISE AT THIS LEVEL?: 40 MIN

## 2021-07-06 ASSESSMENT — SOCIAL DETERMINANTS OF HEALTH (SDOH)
HOW OFTEN DO YOU GET TOGETHER WITH FRIENDS OR RELATIVES?: TWICE A WEEK
HOW OFTEN DO YOU ATTENT MEETINGS OF THE CLUB OR ORGANIZATION YOU BELONG TO?: NEVER
HOW HARD IS IT FOR YOU TO PAY FOR THE VERY BASICS LIKE FOOD, HOUSING, MEDICAL CARE, AND HEATING?: NOT HARD AT ALL
HOW OFTEN DO YOU ATTEND CHURCH OR RELIGIOUS SERVICES?: NEVER
DO YOU BELONG TO ANY CLUBS OR ORGANIZATIONS SUCH AS CHURCH GROUPS UNIONS, FRATERNAL OR ATHLETIC GROUPS, OR SCHOOL GROUPS?: NO
ARE YOU MARRIED, WIDOWED, DIVORCED, SEPARATED, NEVER MARRIED, OR LIVING WITH A PARTNER?: LIVING WITH PARTNER
IN A TYPICAL WEEK, HOW MANY TIMES DO YOU TALK ON THE PHONE WITH FAMILY, FRIENDS, OR NEIGHBORS?: ONCE A WEEK

## 2021-07-06 ASSESSMENT — PATIENT HEALTH QUESTIONNAIRE - PHQ9
2. FEELING DOWN, DEPRESSED OR HOPELESS: NOT AT ALL
SUM OF ALL RESPONSES TO PHQ9 QUESTIONS 1 & 2: 1
SUM OF ALL RESPONSES TO PHQ9 QUESTIONS 1 & 2: 1
DEPRESSION UNABLE TO ASSESS: YES
DEPRESSION UNABLE TO ASSESS: YES
1. LITTLE INTEREST OR PLEASURE IN DOING THINGS: SEVERAL DAYS
2. FEELING DOWN, DEPRESSED OR HOPELESS: NOT AT ALL
1. LITTLE INTEREST OR PLEASURE IN DOING THINGS: SEVERAL DAYS
1. LITTLE INTEREST OR PLEASURE IN DOING THINGS: SEVERAL DAYS
2. FEELING DOWN, DEPRESSED OR HOPELESS: NOT AT ALL
SUM OF ALL RESPONSES TO PHQ9 QUESTIONS 1 & 2: 1
DEPRESSION UNABLE TO ASSESS: YES

## 2021-07-06 ASSESSMENT — ENCOUNTER SYMPTOMS
RESPIRATORY NEGATIVE: 1
GASTROINTESTINAL NEGATIVE: 1
ALLERGIC/IMMUNOLOGIC NEGATIVE: 1
EYES NEGATIVE: 1
SINUS PRESSURE: 1

## 2021-07-06 ASSESSMENT — LIFESTYLE VARIABLES: HOW OFTEN DO YOU HAVE A DRINK CONTAINING ALCOHOL: NEVER

## 2021-07-06 NOTE — PROGRESS NOTES
SUBJECTIVE:  Jose Rg is a 27 y.o. female who presents for evaluation for sinus surgery. The pain is described as pressure and is 7/10 in intensity. Onset was several months ago. Symptoms have been gradually worsening since. Aggravating factors:none. Alleviating factors: none. Associated symptoms: pressure in left sinus, sharp pain. The patient denies chest pain no SOB, no fever or chills, no cough, no N/V/D. PT with headache on Topamax doing a lot better she did experience if you side effect seems getting better,  Review of Systems   Constitutional: Negative. HENT: Positive for postnasal drip and sinus pressure. Eyes: Negative. Respiratory: Negative. Cardiovascular: Negative. Gastrointestinal: Negative. Endocrine: Negative. Genitourinary: Negative. Musculoskeletal: Negative. Allergic/Immunologic: Negative. Neurological: Negative. Hematological: Negative. Psychiatric/Behavioral: Negative. All other systems reviewed and are negative.      Past Medical History:   Diagnosis Date    Anxiety     B[facility] disorder ([facility]     Borderline personality disorder ([facility]     Depression     H/O drug abuse ([facility]     hx of heroin/cocaine    IBS (irritable bowel syndrome)     Obese       Patient Active Problem List    Diagnosis Date Noted    Depression 02/02/2017    Opioid dependence with withdrawal ([facility] 02/02/2017    Cocaine abuse ([facility] 02/02/2017    Heroin use     Suicidal ideation       Past Surgical History:   Procedure Laterality Date    WISDOM TOOTH EXTRACTION       Family History   Problem Relation Age of Onset    Other Maternal Grandmother         demetia    Heart Disease Maternal Grandmother     High Cholesterol Maternal Grandfather     Diabetes Maternal Grandfather     High Blood Pressure Maternal Grandfather     Heart Disease Maternal Grandfather      Social History     Socioeconomic History    Marital status: Life Partner     Spouse name: None    Number of children: None    Years of education: None    Highest education level: None   Occupational History     Comment: Refused to answer   Tobacco Use    Smoking status: Never Smoker    Smokeless tobacco: Never Used   Substance and Sexual Activity    Alcohol use: Yes     Comment: socially    Drug use: Yes     Types: Cocaine, IV     Comment: heroin-snort    Sexual activity: Yes     Partners: Male   Other Topics Concern    None   Social History Narrative    None     Social Determinants of Health     Financial Resource Strain: Low Risk     Difficulty of Paying Living Expenses: Not hard at all   Food Insecurity: No Food Insecurity    Worried About Running Out of Food in the Last Year: Never true    Mike of Food in the Last Year: Never true   Transportation Needs: No Transportation Needs    Lack of Transportation (Medical): No    Lack of Transportation (Non-Medical): No   Physical Activity: Sufficiently Active    Days of Exercise per Week: 6 days    Minutes of Exercise per Session: 40 min   Stress: No Stress Concern Present    Feeling of Stress : Not at all   Social Connections:  Moderately Isolated    Frequency of Communication with Friends and Family: Once a week    Frequency of Social Gatherings with Friends and Family: Twice a week    Attends Latter-day Services: Never    Active Member of Clubs or Organizations: No    Attends Club or Organization Meetings: Never    Marital Status: Living with partner   Intimate Partner Violence:     Fear of Current or Ex-Partner:     Emotionally Abused:     Physically Abused:     Sexually Abused:      Current Outpatient Medications   Medication Sig Dispense Refill    topiramate (TOPAMAX) 50 MG tablet Take 1 tablet by mouth 2 times daily 60 tablet 2    metFORMIN (GLUCOPHAGE) 500 MG tablet Take 500 mg by mouth 2 times daily (with meals)       acyclovir (ZOVIRAX) 400 MG tablet Take 400 mg by mouth      ibuprofen (ADVIL;MOTRIN) 800 MG tablet Take 800 mg by mouth friction rub. No gallop. Pulmonary:      Effort: Pulmonary effort is normal. No respiratory distress. Breath sounds: Normal breath sounds. No stridor. No wheezing or rales. Chest:      Chest wall: No tenderness. Abdominal:      General: Bowel sounds are normal. There is no distension. Palpations: Abdomen is soft. There is no mass. Tenderness: There is no abdominal tenderness. There is no guarding or rebound. Musculoskeletal:         General: No tenderness. Normal range of motion. Cervical back: Normal range of motion and neck supple. Lymphadenopathy:      Cervical: No cervical adenopathy. Skin:     General: Skin is warm and dry. Coloration: Skin is not pale. Findings: No erythema or rash. Neurological:      Mental Status: She is alert and oriented to person, place, and time. Cranial Nerves: No cranial nerve deficit. Motor: No abnormal muscle tone. Coordination: Coordination normal.      Deep Tendon Reflexes: Reflexes are normal and symmetric. Reflexes normal.   Psychiatric:         Behavior: Behavior normal.         Thought Content: Thought content normal.         Judgment: Judgment normal.         ASSESSMENT/PLAN:   Diagnosis Orders   1. Pre-op examination     2. Dysfunction of left eustachian tube     3. Maxillary sinusitis, unspecified chronicity     4. Other migraine without status migrainosus, intractable         1. Discussed the risk of surgery including bleeding and infection, and the risks of general anesthetic including MI, CVA, sudden death or even reaction to anesthetic medications. The patient understands the risks, any and all questions were answered to the patient's satisfaction. 2. Okay for the planned surgery        Date of Surgery Update (To be completed by Attending Surgeon day of surgery)  Copy is given to the pt and one is faxed to the surgeon.

## 2021-07-07 NOTE — TELEPHONE ENCOUNTER
Spoke with patient on the phone. All questions answered and patient scheduled for allergy testing on 8/23/21. She is not taking any allergy medications or nasal sprays at this time. PA not needed from [facility] Yoel Rae.

## 2021-07-08 DIAGNOSIS — J30.9 ALLERGIC RHINITIS, UNSPECIFIED SEASONALITY, UNSPECIFIED TRIGGER: Primary | ICD-10-CM

## 2021-07-12 ENCOUNTER — NURSE ONLY (OUTPATIENT)
Dept: ENT CLINIC | Age: 26
End: 2021-07-12
Payer: COMMERCIAL

## 2021-07-12 VITALS — TEMPERATURE: 97.6 F | HEART RATE: 73 BPM | SYSTOLIC BLOOD PRESSURE: 121 MMHG | DIASTOLIC BLOOD PRESSURE: 84 MMHG

## 2021-07-12 DIAGNOSIS — J30.9 ALLERGIC RHINITIS, UNSPECIFIED SEASONALITY, UNSPECIFIED TRIGGER: ICD-10-CM

## 2021-07-12 PROCEDURE — 95024 IQ TESTS W/ALLERGENIC XTRCS: CPT | Performed by: OTOLARYNGOLOGY

## 2021-07-12 PROCEDURE — 95004 PERQ TESTS W/ALRGNC XTRCS: CPT | Performed by: OTOLARYNGOLOGY

## 2021-07-12 NOTE — Clinical Note
Please order injections to begin after post op visit and call in Generic EpiPen to patient's pharmacy. Thank you.

## 2021-07-12 NOTE — PROGRESS NOTES
Allergy Testing Note        After obtaining consent, and per orders of Dr Pili Florentino, injections of allergy serum given per documentation below by MADHAV Faria RN. Test Information  Consent: Yes  Time Antigens Placed: 9932  Location: Arm (left)  Allergen : ALK Gee  Testing Nurse: MADHAV Faria RN  Reviewing Physician: Pili Florentino  Amount Injected (mL): 0.01    Controls  Negative 0.05% Glycerine-Saline: Not tested  Positive Histamine 1 mg/ml: Not tested    Margie Link: Not tested  Paper Hardin Renner: Not tested  Neches: 3+  Red Frederick: 3+  American Elm: 0  Ranger: Not tested  Miguel Pete: Not tested  Sugar Maple: Not tested  [employer] [place]: 0  Bur Idanha: Not tested  TrelliSoft: Not tested  Sealed Air Corporation: Not tested  [employer] 3+  Black [place]: Not tested  Miguel Thief [place]: Not tested  [employer]: 3+  [employer] Birch: 0  Rainier Eastern: 0  Leelanau/Pecan Mix: 0  Red Maple: 3+  White Oak: 0    Others  [employer] Mite: 3+  Dog Dander: 0  Cat Dander: 0  Feather (Mixed): 4+  Cockroach: 4+    Grasses  Ky Bluegrass: Not tested  Smooth Brome: Not tested  M[place]ova: Not tested  Langley's Pride Fescue: Not tested  Langley's Pride Fescue: 4+ (EP 6)  Luxembourg Nesmith: Not tested  Keya Barrier: 4+  Bermuda: 4+  Cristo: 4+ (EP 5)    Weeds  Cocklebur: Not tested  Unyqe Quarter: 4+ (EP 5)  Burweed Common: Not tested  Mugwort: Not tested  English Plantain: 4+  Giant Ragweed: Not tested  Short Ragweed: Not tested  Sheep Sorrel: 4+ (EP 5)  Kochia: 4+  Red Root Pigweed: 4+ (ROUGH PIGWEED EP 5)  Mixed Ragweed: 4+ (EP 5)    Molds/Fungi  Alternaria Hormodendrum: 4+  Dreschlera: Not tested  Epicoccum Nigrum: 3+  Epidermorphyto Floccosum: Not tested  Fusarium Moniliforme: Not tested  Fusarium Solani: Not tested  Geotrichum Candidum: Not tested  Gliocladium: Not tested  Deliquescence: Not tested  Spondylocladium: Not tested  Atrovirens Microsporum: Not tested  Phoma Betae: Not tested  Rhizopus Oryzae: Not tested  Rhodotorula: 4+  Saccharomyces Cerevisiae : Not tested  Stemphylium Solani: Not tested  Trichoderma Viride: Not tested  Trichophyton Mentagrophytes: Not tested  Cephalothecium Roseum: Not tested  Acremonium Strictum: Not tested  Aspergillus Flavus: Not tested  Aspergillus Fumigatus: Not tested (MOLD NOT AVAILABLE)  Aspergillus Talmage: Not tested  Aureobasidium Pullulans: 4+ (EP 5)  Bipolaris Sorokiniana: Not tested (MOLD NOT AVAILABLE)  Candida Albicans: 4+  Chaetomium Glosbosum: Not tested  Cladosporium Cladosporioides: 4+  Gibberella Pulicaris: 3+  Mucor Plumbeus: 4+  Penniccillum Notatum: 4+    Sudha  instructed to remain in clinic for 30 minutes post testing and to report any adverse reactions immediately. No changes noted in patient status post testing. Testing was completed today. Please view results in the media tab. Patient would like to start allergy injection immunotherapy after she has her surgery with Dr Karen Rodriguez on 7/26/21 and has her follow up appointment. Please advise.

## 2021-07-19 NOTE — PROGRESS NOTES
[address] [facility] patients having surgery or anesthesia are required to be Covid tested OR to have been vaccinated at least 14 days prior to your procedure. It is very important to return our call to [phone] and notify the staff of your last vaccination date otherwise you will be required to complete Covid PCR test within the 5-6 days prior to surgery & quarantine. The results will need to be faxed to PreAdmission Testing at [phone]. PRIOR TO PROCEDURE DATE:        1. PLEASE FOLLOW ANY  GUIDELINES/ INSTRUCTIONS PRIOR TO YOUR PROCEDURE AS ADVISED BY YOUR SURGEON. 2. Arrange for someone to drive you home and be with you for the first 24 hours after discharge for your safety after your procedure for which you received sedation. Ensure it is someone we can share information with regarding your discharge. 3. You must contact your surgeon for instructions IF:   You are taking any blood thinners, aspirin, anti-inflammatory or vitamin E.   There is a change in your physical condition such as a cold, fever, rash, cuts, sores or any other infection, especially near your surgical site. 4. Do not drink alcohol the day before or day of your procedure. 5. A Pre-op History and Physical for surgery MUST be completed by your Physician or Urgent Care within 30 days of your procedure date. Please bring a copy with you on the day of your procedure and along with any other testing performed. THE DAY OF YOUR PROCEDURE:  1. Follow instructions for ARRIVAL TIME as DIRECTED BY YOUR SURGEON. 2. Enter the MAIN entrance from [address] [place] [address] and follow the signs to the free [employer] or [employer] parking (offered free of charge 6am-5pm). 3. Enter the Main Entrance of the hospital (do not enter from the lower level of the parking garage). Upon entrance, check in with the  at the main desk on your left. If no one is available at the desk, proceed into the [facility] Waiting Room and go through the door directly into the [facility]. There is a Check-in desk ACROSS from Room 5 (marked with a sign hanging from the ceiling). The phone number for the surgery center is [phone]. 4. Please call [phone] option #2 option #2 if you have not been preregistered yet. On the day of your procedure bring your insurance card and photo ID. You will be registered at your bedside once brought back to your room. 5. DO NOT EAT ANYTHING eight hours prior to your arrival for surgery. May have 8 ounces of water 4 hours prior to your arrival for surgery. NOTE: ALL Gastric, Bariatric and Bowel surgery patients MUST follow their surgeon's instructions. 6. MEDICATIONS    Take the following medications with a SMALL sip of water:Topimax   Bariatric patient's call surgeon if on diabetic medications as some need to be stopped 1 week preop   Use your usual dose of inhalers the morning of surgery. BRING your rescue inhaler with you to hospital.    Anesthesia does NOT want you to take insulin the morning of surgery. They will control your blood sugar while you are at the hospital. Please contact your ordering physician for instructions regarding your insulin the night before your procedure. If you have an insulin pump, please keep it set on basal rate. 7. Do not swallow water when brushing teeth. No gum, candy, mints or ice chips. Refrain from smoking or at least decrease the amount. 8. Dress in loose, comfortable clothing appropriate for redressing after your procedure. Do not wear jewelry (including body piercings), make-up (especially NO eye make-up), fingernail polish (NO toenail polish if foot/leg surgery), lotion, powders or metal hairclips. 9. Dentures, glasses, or contacts will need to be removed before your procedure.  Bring cases for your glasses, contacts, dentures, or hearing aids to protect them while you are in surgery. 10. If you use a CPAP, please bring it with you on the day of your procedure. 11. We recommend that valuable personal  belongings such as cash, cell phones, e-tablets or jewelry, be left at home during your stay. The hospital will not be responsible for valuables that are not secured in the hospital safe. However, if your insurance requires a co-pay, you may want to bring a method of payment, i.e. Check or credit card, if you wish to pay your co-pay the day of surgery. 12. If you are to stay overnight, you may bring a bag with personal items. Please have any large items you may need brought in by your family after your arrival to your hospital room. 15. If you have a Living Will or Durable Power of , please bring a copy on the day of your procedure. 15. With your permission, one family member may accompany you while you are being prepared for surgery. Once you are ready, additional family members may join you. HOW WE KEEP YOU SAFE and WORK TO PREVENT SURGICAL SITE INFECTIONS:  1. Health care workers should always check your ID bracelet to verify your name and birth date. You will be asked many times to state your name, date of birth, and allergies. 2. Health care workers should always clean their hands with soap or alcohol gel before providing care to you. It is okay to ask anyone if they cleaned their hands before they touch you. 3. You will be actively involved in verifying the type of procedure you are having and ensuring the correct surgical site. This will be confirmed multiple times prior to your procedure. Do NOT russell your surgery site UNLESS instructed to by your surgeon. 4. Do not shave or wax for 72 hours prior to procedure near your operative site. Shaving with a razor can irritate your skin and make it easier to develop an infection.  On the day of your procedure, any hair that needs to be removed near the surgical site will be clipped by a healthcare worker using a special clippers designed to avoid skin irritation. 5. When you are in the operating room, your surgical site will be cleansed with a special soap, and in most cases, you will be given an antibiotic before the surgery begins. What to expect AFTER YOUR PROCEDURE:  1. Immediately following your procedure, your will be taken to the PACU for the first phase of your recovery. Your nurse will help you recover from any potential side effects of anesthesia, such as extreme drowsiness, changes in your vital signs or breathing patterns. Nausea, headache, muscle aches, or sore throat may also occur after anesthesia. Your nurse will help you manage these potential side effects. 2. For comfort and safety, arrange to have someone at home with you for the first 24 hours after discharge. 3. You and your family will be given written instructions about your diet, activity, dressing care, medications, and return visits. 4. Once at home, should issues with nausea, pain, or bleeding occur, or should you notice any signs of infection, you should call your surgeon. 5. Always clean your hands before and after caring for your wound. Do not let your family touch your surgery site without cleaning their hands. 6. Narcotic pain medications can cause significant constipation. You may want to add a stool softener to your postoperative medication schedule or speak to your surgeon on how best to manage this SIDE EFFECT. SPECIAL INSTRUCTIONS   Thank you for allowing us to care for you. We strive to exceed your expectations in the delivery of care and service provided to you and your family. If you need to contact the [place] [address] staff for any reason, please call us at [phone]    Instructions reviewed with patient during preadmission testing phone interview.   aJcqui Romeo RN.7/19/2021 .1:09 PM      ADDITIONAL EDUCATIONAL INFORMATION REVIEWED PER PHONE WITH YOU AND/OR YOUR FAMILY:    Yes Antibacterial Soap

## 2021-07-20 DIAGNOSIS — G43.819 OTHER MIGRAINE WITHOUT STATUS MIGRAINOSUS, INTRACTABLE: ICD-10-CM

## 2021-07-20 RX ORDER — SUMATRIPTAN 100 MG/1
100 TABLET, FILM COATED ORAL
Qty: 12 TABLET | Refills: 1 | Status: SHIPPED | OUTPATIENT
Start: 2021-07-20 | End: 2021-08-30 | Stop reason: SDUPTHER

## 2021-07-21 ENCOUNTER — HOSPITAL ENCOUNTER (OUTPATIENT)
Age: 26
Discharge: HOME | End: 2021-07-21
Payer: COMMERCIAL

## 2021-07-21 PROCEDURE — U0003 INFECTIOUS AGENT DETECTION BY NUCLEIC ACID (DNA OR RNA); SEVERE ACUTE RESPIRATORY SYNDROME CORONAVIRUS 2 (SARS-COV-2) (CORONAVIRUS DISEASE [COVID-19]), AMPLIFIED PROBE TECHNIQUE, MAKING USE OF HIGH THROUGHPUT TECHNOLOGIES AS DESCRIBED BY CMS-2020-01-R: HCPCS

## 2021-07-21 PROCEDURE — U0005 INFEC AGEN DETEC AMPLI PROBE: HCPCS

## 2021-07-22 ENCOUNTER — ANESTHESIA EVENT (OUTPATIENT)
Dept: OPERATING ROOM | Age: 26
End: 2021-07-22
Payer: COMMERCIAL

## 2021-07-22 LAB — SARS-COV-2: NOT DETECTED

## 2021-07-23 ENCOUNTER — TELEPHONE (OUTPATIENT)
Dept: ENT CLINIC | Age: 26
End: 2021-07-23

## 2021-07-23 NOTE — PROGRESS NOTES
[facility] [employer] / [facility] [place] ([facility]) [address] [facility], [address]    Acknowledgment of Informed Consent for Surgical or Medical Procedure and Sedation  I agree to allow doctor(s) Karen Lam and his/her associates or assistants, including residents and/or other qualified medical practitioner to perform the following medical treatment or procedure and to administer or direct the administration of sedation as necessary:  Procedure(s): SPHENOIDOTOMY RIGHT, BILATERAL ANTERIOR ETHMOIDECTOMY, BILATERAL INFERIOR TURBINATE REDUCTION, BILATERAL MAXILLARY ANTROSTOMY AND SEPTOPLASTY, NASAL SCOPE    My doctor has explained the following regarding the proposed procedure:   the explanation of the procedure   the benefits of the procedure   the potential problems that might occur during recuperation   the risks and side effects of the procedure which could include but are not limited to severe blood loss, infection, stroke or death   the benefits, risks and side effect of alternative procedures including the consequences of declining this procedure or any alternative procedures   the likelihood of achieving satisfactory results. I acknowledge no guarantee or assurance has been made to me regarding the results. I understand that during the course of this treatment/procedure, unforeseen conditions can occur which require an additional or different procedure. I agree to allow my physician or assistants to perform such extension of the original procedure as they may find necessary. I understand that sedation will often result in temporary impairment of memory and fine motor skills and that sedation can occasionally progress to a state of deep sedation or general anesthesia.     I understand the risks of anesthesia for surgery include, but are not limited to, sore throat, hoarseness, injury to face, mouth, or teeth; nausea; headache; injury to blood vessels or nerves; death, brain damage, or paralysis. I understand that if I have a Limitation of Treatment order in effect during my hospitalization, the order may or may not be in effect during this procedure. I give my doctor permission to give me blood or blood products. I understand that there are risks with receiving blood such as hepatitis, AIDS, fever, or allergic reaction. I acknowledge that the risks, benefits, and alternatives of this treatment have been explained to me and that no express or implied warranty has been given by the hospital, any blood bank, or any person or entity as to the blood or blood components transfused. At the discretion of my doctor, I agree to allow observers, equipment/product representatives and allow photographing, and/or televising of the procedure, provided my name or identity is maintained confidentially. I agree the hospital may dispose of or use for scientific or educational purposes any tissue, fluid, or body parts which may be removed.     ________________________________Date________Time______ am/pm  ([place] [employer])  Patient or Signature of Closest Relative or Legal Guardian    ________________________________Date________Time______am/pm      Page 1 of  1  Witness

## 2021-07-23 NOTE — TELEPHONE ENCOUNTER
Spoke with patient regarding upcoming surgery. Patient is aware of arrival and surgery time. Patient knows not to eat or drink anything after midnight the day of the surgery. Covid and pre-op are complete.

## 2021-07-26 ENCOUNTER — ANESTHESIA (OUTPATIENT)
Dept: OPERATING ROOM | Age: 26
End: 2021-07-26
Payer: COMMERCIAL

## 2021-07-26 ENCOUNTER — HOSPITAL ENCOUNTER (OUTPATIENT)
Age: 26
Setting detail: OUTPATIENT SURGERY
Discharge: HOME | End: 2021-07-26
Attending: OTOLARYNGOLOGY | Admitting: OTOLARYNGOLOGY
Payer: COMMERCIAL

## 2021-07-26 VITALS
RESPIRATION RATE: 13 BRPM | DIASTOLIC BLOOD PRESSURE: 56 MMHG | SYSTOLIC BLOOD PRESSURE: 103 MMHG | TEMPERATURE: 96.3 F | OXYGEN SATURATION: 99 %

## 2021-07-26 VITALS
TEMPERATURE: 98.3 F | BODY MASS INDEX: 34.36 KG/M2 | HEART RATE: 91 BPM | SYSTOLIC BLOOD PRESSURE: 111 MMHG | WEIGHT: 175 LBS | DIASTOLIC BLOOD PRESSURE: 71 MMHG | OXYGEN SATURATION: 99 % | HEIGHT: 60 IN | RESPIRATION RATE: 16 BRPM

## 2021-07-26 DIAGNOSIS — J34.3 HYPERTROPHY, NASAL, TURBINATE: ICD-10-CM

## 2021-07-26 DIAGNOSIS — J32.3 CHRONIC SPHENOIDAL SINUSITIS: ICD-10-CM

## 2021-07-26 DIAGNOSIS — J34.2 DEVIATED NASAL SEPTUM: ICD-10-CM

## 2021-07-26 DIAGNOSIS — J32.0 CHRONIC MAXILLARY SINUSITIS: ICD-10-CM

## 2021-07-26 PROBLEM — J32.4 CHRONIC PANSINUSITIS: Status: ACTIVE | Noted: 2021-07-26

## 2021-07-26 LAB
GLUCOSE BLD-MCNC: 91 MG/DL (ref 70–99)
PERFORMED ON: NORMAL
PREGNANCY, URINE: NEGATIVE

## 2021-07-26 PROCEDURE — 88311 DECALCIFY TISSUE: CPT

## 2021-07-26 PROCEDURE — 2500000003 HC RX 250 WO HCPCS: Performed by: NURSE ANESTHETIST, CERTIFIED REGISTERED

## 2021-07-26 PROCEDURE — 6360000002 HC RX W HCPCS: Performed by: ANESTHESIOLOGY

## 2021-07-26 PROCEDURE — 84703 CHORIONIC GONADOTROPIN ASSAY: CPT

## 2021-07-26 PROCEDURE — 7100000001 HC PACU RECOVERY - ADDTL 15 MIN: Performed by: OTOLARYNGOLOGY

## 2021-07-26 PROCEDURE — 30520 REPAIR OF NASAL SEPTUM: CPT | Performed by: OTOLARYNGOLOGY

## 2021-07-26 PROCEDURE — 2709999900 HC NON-CHARGEABLE SUPPLY: Performed by: OTOLARYNGOLOGY

## 2021-07-26 PROCEDURE — 7100000000 HC PACU RECOVERY - FIRST 15 MIN: Performed by: OTOLARYNGOLOGY

## 2021-07-26 PROCEDURE — 6360000002 HC RX W HCPCS: Performed by: NURSE ANESTHETIST, CERTIFIED REGISTERED

## 2021-07-26 PROCEDURE — 2720000010 HC SURG SUPPLY STERILE: Performed by: OTOLARYNGOLOGY

## 2021-07-26 PROCEDURE — 30140 RESECT INFERIOR TURBINATE: CPT | Performed by: OTOLARYNGOLOGY

## 2021-07-26 PROCEDURE — 31254 NSL/SINS NDSC W/PRTL ETHMDCT: CPT | Performed by: OTOLARYNGOLOGY

## 2021-07-26 PROCEDURE — 3600000004 HC SURGERY LEVEL 4 BASE: Performed by: OTOLARYNGOLOGY

## 2021-07-26 PROCEDURE — 3600000014 HC SURGERY LEVEL 4 ADDTL 15MIN: Performed by: OTOLARYNGOLOGY

## 2021-07-26 PROCEDURE — 88304 TISSUE EXAM BY PATHOLOGIST: CPT

## 2021-07-26 PROCEDURE — 3700000000 HC ANESTHESIA ATTENDED CARE: Performed by: OTOLARYNGOLOGY

## 2021-07-26 PROCEDURE — 2500000003 HC RX 250 WO HCPCS: Performed by: OTOLARYNGOLOGY

## 2021-07-26 PROCEDURE — 31287 NASAL/SINUS ENDOSCOPY SURG: CPT | Performed by: OTOLARYNGOLOGY

## 2021-07-26 PROCEDURE — 7100000010 HC PHASE II RECOVERY - FIRST 15 MIN: Performed by: OTOLARYNGOLOGY

## 2021-07-26 PROCEDURE — 2580000003 HC RX 258: Performed by: ANESTHESIOLOGY

## 2021-07-26 PROCEDURE — 2580000003 HC RX 258: Performed by: OTOLARYNGOLOGY

## 2021-07-26 PROCEDURE — 6370000000 HC RX 637 (ALT 250 FOR IP): Performed by: OTOLARYNGOLOGY

## 2021-07-26 PROCEDURE — 3700000001 HC ADD 15 MINUTES (ANESTHESIA): Performed by: OTOLARYNGOLOGY

## 2021-07-26 PROCEDURE — 31256 EXPLORATION MAXILLARY SINUS: CPT | Performed by: OTOLARYNGOLOGY

## 2021-07-26 PROCEDURE — 7100000011 HC PHASE II RECOVERY - ADDTL 15 MIN: Performed by: OTOLARYNGOLOGY

## 2021-07-26 RX ORDER — OXYMETAZOLINE HYDROCHLORIDE 0.05 G/100ML
SPRAY NASAL PRN
Status: DISCONTINUED | OUTPATIENT
Start: 2021-07-26 | End: 2021-07-26 | Stop reason: ALTCHOICE

## 2021-07-26 RX ORDER — ENALAPRILAT 2.5 MG/2ML
1.25 INJECTION INTRAVENOUS
Status: DISCONTINUED | OUTPATIENT
Start: 2021-07-26 | End: 2021-07-26 | Stop reason: HOSPADM

## 2021-07-26 RX ORDER — FENTANYL CITRATE 50 UG/ML
50 INJECTION, SOLUTION INTRAMUSCULAR; INTRAVENOUS EVERY 5 MIN PRN
Status: DISCONTINUED | OUTPATIENT
Start: 2021-07-26 | End: 2021-07-26 | Stop reason: HOSPADM

## 2021-07-26 RX ORDER — LIDOCAINE HYDROCHLORIDE 20 MG/ML
INJECTION, SOLUTION INFILTRATION; PERINEURAL PRN
Status: DISCONTINUED | OUTPATIENT
Start: 2021-07-26 | End: 2021-07-26 | Stop reason: SDUPTHER

## 2021-07-26 RX ORDER — ACETAMINOPHEN 500 MG
500 TABLET ORAL EVERY 6 HOURS PRN
Qty: 60 TABLET | Refills: 0 | Status: SHIPPED | OUTPATIENT
Start: 2021-07-26 | End: 2022-06-29

## 2021-07-26 RX ORDER — FENTANYL CITRATE 50 UG/ML
25 INJECTION, SOLUTION INTRAMUSCULAR; INTRAVENOUS EVERY 5 MIN PRN
Status: DISCONTINUED | OUTPATIENT
Start: 2021-07-26 | End: 2021-07-26 | Stop reason: HOSPADM

## 2021-07-26 RX ORDER — PROPOFOL 10 MG/ML
INJECTION, EMULSION INTRAVENOUS PRN
Status: DISCONTINUED | OUTPATIENT
Start: 2021-07-26 | End: 2021-07-26 | Stop reason: SDUPTHER

## 2021-07-26 RX ORDER — FENTANYL CITRATE 50 UG/ML
INJECTION, SOLUTION INTRAMUSCULAR; INTRAVENOUS PRN
Status: DISCONTINUED | OUTPATIENT
Start: 2021-07-26 | End: 2021-07-26 | Stop reason: SDUPTHER

## 2021-07-26 RX ORDER — LIDOCAINE HYDROCHLORIDE 10 MG/ML
1 INJECTION, SOLUTION EPIDURAL; INFILTRATION; INTRACAUDAL; PERINEURAL
Status: DISCONTINUED | OUTPATIENT
Start: 2021-07-26 | End: 2021-07-26 | Stop reason: HOSPADM

## 2021-07-26 RX ORDER — HYDRALAZINE HYDROCHLORIDE 20 MG/ML
5 INJECTION INTRAMUSCULAR; INTRAVENOUS EVERY 5 MIN PRN
Status: DISCONTINUED | OUTPATIENT
Start: 2021-07-26 | End: 2021-07-26 | Stop reason: HOSPADM

## 2021-07-26 RX ORDER — DEXAMETHASONE SODIUM PHOSPHATE 4 MG/ML
INJECTION, SOLUTION INTRA-ARTICULAR; INTRALESIONAL; INTRAMUSCULAR; INTRAVENOUS; SOFT TISSUE PRN
Status: DISCONTINUED | OUTPATIENT
Start: 2021-07-26 | End: 2021-07-26 | Stop reason: SDUPTHER

## 2021-07-26 RX ORDER — AMOXICILLIN 500 MG/1
500 CAPSULE ORAL 2 TIMES DAILY
Qty: 14 CAPSULE | Refills: 0 | Status: SHIPPED | OUTPATIENT
Start: 2021-07-26 | End: 2021-08-02

## 2021-07-26 RX ORDER — SODIUM CHLORIDE 0.9 % (FLUSH) 0.9 %
10 SYRINGE (ML) INJECTION PRN
Status: DISCONTINUED | OUTPATIENT
Start: 2021-07-26 | End: 2021-07-26 | Stop reason: HOSPADM

## 2021-07-26 RX ORDER — SODIUM CHLORIDE, SODIUM LACTATE, POTASSIUM CHLORIDE, CALCIUM CHLORIDE 600; 310; 30; 20 MG/100ML; MG/100ML; MG/100ML; MG/100ML
INJECTION, SOLUTION INTRAVENOUS CONTINUOUS
Status: DISCONTINUED | OUTPATIENT
Start: 2021-07-26 | End: 2021-07-26 | Stop reason: HOSPADM

## 2021-07-26 RX ORDER — ROCURONIUM BROMIDE 10 MG/ML
INJECTION, SOLUTION INTRAVENOUS PRN
Status: DISCONTINUED | OUTPATIENT
Start: 2021-07-26 | End: 2021-07-26 | Stop reason: SDUPTHER

## 2021-07-26 RX ORDER — LIDOCAINE HYDROCHLORIDE AND EPINEPHRINE 10; 10 MG/ML; UG/ML
INJECTION, SOLUTION INFILTRATION; PERINEURAL PRN
Status: DISCONTINUED | OUTPATIENT
Start: 2021-07-26 | End: 2021-07-26 | Stop reason: ALTCHOICE

## 2021-07-26 RX ORDER — MAGNESIUM HYDROXIDE 1200 MG/15ML
LIQUID ORAL CONTINUOUS PRN
Status: COMPLETED | OUTPATIENT
Start: 2021-07-26 | End: 2021-07-26

## 2021-07-26 RX ORDER — SODIUM CHLORIDE 9 MG/ML
25 INJECTION, SOLUTION INTRAVENOUS PRN
Status: DISCONTINUED | OUTPATIENT
Start: 2021-07-26 | End: 2021-07-26 | Stop reason: HOSPADM

## 2021-07-26 RX ORDER — LABETALOL HYDROCHLORIDE 5 MG/ML
5 INJECTION, SOLUTION INTRAVENOUS EVERY 10 MIN PRN
Status: DISCONTINUED | OUTPATIENT
Start: 2021-07-26 | End: 2021-07-26 | Stop reason: HOSPADM

## 2021-07-26 RX ORDER — ONDANSETRON 2 MG/ML
INJECTION INTRAMUSCULAR; INTRAVENOUS PRN
Status: DISCONTINUED | OUTPATIENT
Start: 2021-07-26 | End: 2021-07-26 | Stop reason: SDUPTHER

## 2021-07-26 RX ORDER — METOCLOPRAMIDE HYDROCHLORIDE 5 MG/ML
INJECTION INTRAMUSCULAR; INTRAVENOUS PRN
Status: DISCONTINUED | OUTPATIENT
Start: 2021-07-26 | End: 2021-07-26 | Stop reason: SDUPTHER

## 2021-07-26 RX ORDER — SODIUM CHLORIDE 0.9 % (FLUSH) 0.9 %
10 SYRINGE (ML) INJECTION EVERY 12 HOURS SCHEDULED
Status: DISCONTINUED | OUTPATIENT
Start: 2021-07-26 | End: 2021-07-26 | Stop reason: HOSPADM

## 2021-07-26 RX ORDER — ONDANSETRON 2 MG/ML
4 INJECTION INTRAMUSCULAR; INTRAVENOUS
Status: COMPLETED | OUTPATIENT
Start: 2021-07-26 | End: 2021-07-26

## 2021-07-26 RX ADMIN — LIDOCAINE HYDROCHLORIDE 100 MG: 20 INJECTION, SOLUTION INFILTRATION; PERINEURAL at 09:25

## 2021-07-26 RX ADMIN — FENTANYL CITRATE 200 MCG: 50 INJECTION, SOLUTION INTRAMUSCULAR; INTRAVENOUS at 09:23

## 2021-07-26 RX ADMIN — PROPOFOL 250 MG: 10 INJECTION, EMULSION INTRAVENOUS at 09:25

## 2021-07-26 RX ADMIN — FAMOTIDINE 20 MG: 10 INJECTION, SOLUTION INTRAVENOUS at 09:23

## 2021-07-26 RX ADMIN — SODIUM CHLORIDE, POTASSIUM CHLORIDE, SODIUM LACTATE AND CALCIUM CHLORIDE: 600; 310; 30; 20 INJECTION, SOLUTION INTRAVENOUS at 08:19

## 2021-07-26 RX ADMIN — SUGAMMADEX 1 MG: 100 INJECTION, SOLUTION INTRAVENOUS at 10:42

## 2021-07-26 RX ADMIN — DEXAMETHASONE SODIUM PHOSPHATE 8 MG: 4 INJECTION, SOLUTION INTRAMUSCULAR; INTRAVENOUS at 09:26

## 2021-07-26 RX ADMIN — ONDANSETRON 4 MG: 2 INJECTION INTRAMUSCULAR; INTRAVENOUS at 09:23

## 2021-07-26 RX ADMIN — PROPOFOL 30 MG: 10 INJECTION, EMULSION INTRAVENOUS at 10:20

## 2021-07-26 RX ADMIN — HYDROMORPHONE HYDROCHLORIDE 0.5 MG: 1 INJECTION, SOLUTION INTRAMUSCULAR; INTRAVENOUS; SUBCUTANEOUS at 11:04

## 2021-07-26 RX ADMIN — ROCURONIUM BROMIDE 50 MG: 10 INJECTION INTRAVENOUS at 09:23

## 2021-07-26 RX ADMIN — ONDANSETRON 4 MG: 2 INJECTION INTRAMUSCULAR; INTRAVENOUS at 11:04

## 2021-07-26 RX ADMIN — HYDROMORPHONE HYDROCHLORIDE 0.25 MG: 1 INJECTION, SOLUTION INTRAMUSCULAR; INTRAVENOUS; SUBCUTANEOUS at 11:13

## 2021-07-26 RX ADMIN — METOCLOPRAMIDE 10 MG: 5 INJECTION, SOLUTION INTRAMUSCULAR; INTRAVENOUS at 09:27

## 2021-07-26 RX ADMIN — PROPOFOL 100 MG: 10 INJECTION, EMULSION INTRAVENOUS at 10:12

## 2021-07-26 ASSESSMENT — PAIN DESCRIPTION - PAIN TYPE
TYPE: SURGICAL PAIN
TYPE: ACUTE PAIN;SURGICAL PAIN

## 2021-07-26 ASSESSMENT — PULMONARY FUNCTION TESTS
PIF_VALUE: 16
PIF_VALUE: 0
PIF_VALUE: 20
PIF_VALUE: 16
PIF_VALUE: 15
PIF_VALUE: 16
PIF_VALUE: 14
PIF_VALUE: 16
PIF_VALUE: 13
PIF_VALUE: 20
PIF_VALUE: 14
PIF_VALUE: 16
PIF_VALUE: 11
PIF_VALUE: 0
PIF_VALUE: 13
PIF_VALUE: 17
PIF_VALUE: 15
PIF_VALUE: 14
PIF_VALUE: 11
PIF_VALUE: 20
PIF_VALUE: 14
PIF_VALUE: 11
PIF_VALUE: 13
PIF_VALUE: 16
PIF_VALUE: 20
PIF_VALUE: 15
PIF_VALUE: 16
PIF_VALUE: 14
PIF_VALUE: 14
PIF_VALUE: 0
PIF_VALUE: 15
PIF_VALUE: 15
PIF_VALUE: 11
PIF_VALUE: 13
PIF_VALUE: 19
PIF_VALUE: 16
PIF_VALUE: 16
PIF_VALUE: 14
PIF_VALUE: 14
PIF_VALUE: 16
PIF_VALUE: 23
PIF_VALUE: 16
PIF_VALUE: 2
PIF_VALUE: 16
PIF_VALUE: 2
PIF_VALUE: 15
PIF_VALUE: 1
PIF_VALUE: 14
PIF_VALUE: 16
PIF_VALUE: 14
PIF_VALUE: 13
PIF_VALUE: 18
PIF_VALUE: 14
PIF_VALUE: 16
PIF_VALUE: 14
PIF_VALUE: 13
PIF_VALUE: 13
PIF_VALUE: 14
PIF_VALUE: 1
PIF_VALUE: 1
PIF_VALUE: 14
PIF_VALUE: 15
PIF_VALUE: 16
PIF_VALUE: 14
PIF_VALUE: 15
PIF_VALUE: 16

## 2021-07-26 ASSESSMENT — PAIN SCALES - GENERAL
PAINLEVEL_OUTOF10: 8
PAINLEVEL_OUTOF10: 5
PAINLEVEL_OUTOF10: 4
PAINLEVEL_OUTOF10: 7

## 2021-07-26 ASSESSMENT — PAIN DESCRIPTION - DESCRIPTORS
DESCRIPTORS: PRESSURE
DESCRIPTORS: OTHER (COMMENT)
DESCRIPTORS: ACHING;BURNING;CONSTANT

## 2021-07-26 ASSESSMENT — PAIN DESCRIPTION - FREQUENCY
FREQUENCY: CONTINUOUS
FREQUENCY: CONTINUOUS

## 2021-07-26 ASSESSMENT — PAIN DESCRIPTION - ORIENTATION
ORIENTATION: RIGHT;LEFT
ORIENTATION: INNER

## 2021-07-26 ASSESSMENT — PAIN - FUNCTIONAL ASSESSMENT
PAIN_FUNCTIONAL_ASSESSMENT: ACTIVITIES ARE NOT PREVENTED
PAIN_FUNCTIONAL_ASSESSMENT: 0-10

## 2021-07-26 ASSESSMENT — PAIN DESCRIPTION - LOCATION
LOCATION: NOSE
LOCATION: NOSE

## 2021-07-26 ASSESSMENT — PAIN DESCRIPTION - ONSET: ONSET: ON-GOING

## 2021-07-26 ASSESSMENT — PAIN DESCRIPTION - PROGRESSION: CLINICAL_PROGRESSION: GRADUALLY WORSENING

## 2021-07-26 NOTE — OP NOTE
Patient Name: Mora Borja  YOB: 1995  Medical Record Number:  [id number]  Billing Number:  [id number]  -Date of Procedure: 7-  Tmarcus: 1012      Pre-operative Diagnosis: 1. Chronic pansinusitis 2. Deviated nasal septum 3. Bilateral inferior turban hypertrophy. Post-operative Diagnosis: Same  Proc:  1. Bilaertal nasal endoscopy with maxillary antrosotomy (85484:50)   2. Bilateral nasal endoscopy with anterior ethmoidectomy (81692:50)   3. Right nasal endoscopy with sphenoidotomy (46857)   4. Nasal septal reconstruction (69158)   5. Bilateral inferior turbinate reduction  Surgeon:  Catarina Ochoa DO  OR Staff: Circulator: Vanessa Allison RN  Scrub Person First: Darryl Flynn RN  Anesthesia: General endotracheal  1. Afrin and 4% lidocaine topical in the nasal cavity and on pledgets in the middle meatus    2. 1% lidocaine with 1:100,000 epinephrine injected in the uncinate and middle turbinate bilateral  EBL: <50ml  Specimens: Sinus contents  Findings: bilateral maxillary, ethmoid, right sphenoid sinus contents  Complications: none  Brief History: This is a 41-year-old female with a history of chronic pansinusitis and nasal obstruction. Noted deviated septum as well as chronic sinusitis on CT. Recommended that surgery. Risk benefits discussed. Description of procedure:    After consent was confirmed the patient came into the operating room and was placed in supine position. General IV anesthesia was obtained and the patient intubated by Anesthesiology without difficulty. The table was turned and 10 cc's of 1% lidocaine with 1:100,000 epinephrine was injected into the septum, inferior turbinate, uncinate, middle turbinate and anterior wall of the sphenoid sinus. The nose was packed with Afrin-soaked cottonoids. Patient was prepped and draped in a standard fashion. A proper timeout was performed. The cottonoids were removed. Surgery began on the left nasal cavity.  Using a zero degree endoscope and and a [place] elevator the middle turbinate was medialized in its inferior third. The uncinate was in fractured with a frontal sinus seeker. A Teddy's window was made with a back biting forceps. The uncinate was removed with a 4mm, 0 degree straight shot micro debrider in its entirety from the inferior turbinate to the skull base. The natural ostium of the maxillary sinus was identified and back-fractured through the fontanelle with a frontal sinus seeker. A large maxillary antrostomy was then performed with the 0 degree micro debrider. A 0 degree micro-debrider was used to remove the ethmoid bulla lateral to the lamina papyracea , superior to the skull base and posterior to the basal lamella. 1:1000 topical epinephrine pledgets were occasionally placed for hemostasis. Surgery continued on the right nasal cavity. Using a zero degree endoscope and and a caudal elevator the middle turbinate was medialized in its inferior third. The uncinate was in fractured with a frontal sinus seeker. A Teddy's window was made with a back biting forceps. The uncinate was removed with a 4mm, 0 degree straight shot micro debrider in its entirety from the inferior turbinate to the skull base. The natural ostium of the maxillary sinus was identified and back-fractured through the fontanelle with a frontal sinus seeker. A large maxillary antrostomy was then performed with the 0 degree micro debrider. A 0 degree micro-debrider was used to remove the ethmoid bulla lateral to the lamina papyracea , superior to the skull base and posterior to the basal lamella. 1:1000 topical epinephrine pledgets were occasionally placed for hemostasis. The natural ostium of the sphenoid was identified with a [place] elevator, entered and down-fractured in a medial and inferior direction. A large sphenoidotomy was created with [place] rongeurs and the micro debrider.  1:1000 topical epinephrine pledgets were occasionally placed for hemostasis. Attention was then turned to the septum. Under direct visualization, using a 15 blade a left-sided hemitransfixion incision was made in the septal mucosa. Dissection was carried down to the level of the mucoperichondrial plane. A [place] elevator was used to elevate a mucoperichondrial flap. Once the flap was elevated at 15 blade was used to make a transverse cartilaginous incision. The contralateral flap is elevated in similar fashion. A Kelly swivel knife was used to resect a portion of the quadrangular cartilage leaving sufficient superior and anterior cartilage for tip and dorsal support. Once this was complete an angled scissors using make a cut and the perpendicular plate of ethmoid. Coretta Dye forceps were then used to piecemeal removed perpendicular plate of ethmoid and vomer. Phineas Kroner was used to elevate the flaps off the maxillary crest.  An osteotome was used to remove the deviated portion of the maxillary crest.  The flaps and placed back down. The incision was closed with 4-0 chromic simple interrupted suture. Attention was then turned to the inferior turbinates. The inferior turbinates were injected with 1% lidocaine with 1 100,000 epinephrine. An 11 blade is used to make a stab incision at the head of the inferior turbinates bilaterally. A [place] elevator was used to create a submucosal pocket. The 2.0 Xomed microdebrider turbinate wand was used to resect submucosal turbinate tissue. A long nasal speculum was then used to outfracture the inferior turbinates bilaterally. Examination of the nasal passages reveal significant improvement in the obstruction. Bactroban coated Hoffman splints were then placed in the nares bilaterally and sutured in place with a 3-0 Prolene suture. Pledgets were removed and the wounds were thoroughly irrigated with sterile saline.  Posisep sinus splints were placed between the middle turbinates and lateral sinus wall on the bilateral. The splints were saturated with sterile saline solution. There was no further significant bleeding. Bactroban coated Hoffman splints were then placed in the nares bilaterally and sutured in place with a 3-0 Prolene on a Jl needle. This concluded my procedure. The care of the patient was turned back over the anesthesia team.  The patient was then awoken from general anesthesia, extubated and sent to the PACU in stable condition.      Electronically signed by Janis Quinonez DO on 7/26/21 at 10:36 AM EDT

## 2021-07-26 NOTE — PROGRESS NOTES
PACU Transfer to [facility]    Vitals:    07/26/21 1137   BP: 122/77   Pulse: 99   Resp: 16   Temp: 96.9 °F (36.1 °C)   SpO2: 100%         Intake/Output Summary (Last 24 hours) at 7/26/2021 1141  Last data filed at 7/26/2021 1113  Gross per 24 hour   Intake 199 ml   Output 300 ml   Net -101 ml       Pain assessment:  3  Patient transferred to care of [facility] RN.    7/26/2021 11:41 AM

## 2021-07-26 NOTE — ANESTHESIA POSTPROCEDURE EVALUATION
Department of Anesthesiology  Postprocedure Note    Patient: Loyda Peters  MRN: [id number]  YOB: 1995  Date of evaluation: 7/26/2021  Time:  1:31 PM     Procedure Summary     Date: 07/26/21 Room / Location: [facility] [address]    Anesthesia Start: 8614 Anesthesia Stop: 6025    Procedure: SPHENOIDOTOMY RIGHT, BILATERAL ANTERIOR ETHMOIDECTOMY, BILATERAL INFERIOR TURBINATE REDUCTION, BILATERAL MAXILLARY ANTROSTOMY AND SEPTOPLASTY, NASAL SCOPE (N/A ) Diagnosis:       Chronic maxillary sinusitis      Chronic sphenoidal sinusitis      Deviated nasal septum      Hypertrophy, nasal, turbinate      (Chronic maxillary sinusitis [J32.0] Chronic sphenoidal sinusitis [J32.3] Deviated nasal septum [J34.2] Hypertrophy, nasal, turbinate [J34.3])    Surgeons: Summer Zimmerman DO Responsible Provider: Karen Gurrola MD    Anesthesia Type: general ASA Status: 2          Anesthesia Type: general    Ernesto Phase I: Ernesto Score: 10    Ernesto Phase II: Ernesto Score: 10    Last vitals: Reviewed and per EMR flowsheets.        Anesthesia Post Evaluation    Patient participation: complete - patient participated  Level of consciousness: awake  Airway patency: patent  Nausea & Vomiting: no nausea and no vomiting  Complications: no  Cardiovascular status: hemodynamically stable  Respiratory status: acceptable  Hydration status: stable

## 2021-07-26 NOTE — BRIEF OP NOTE
Brief Postoperative Note      Patient: Garth Pozo  YOB: 1995  MRN: [id number]    Date of Procedure: 7/26/2021    Pre-Op Diagnosis: Chronic maxillary sinusitis [J32.0] Chronic sphenoidal sinusitis [J32.3] Deviated nasal septum [J34.2] Hypertrophy, nasal, turbinate [J34.3]    Post-Op Diagnosis: Same       Procedure(s):  SPHENOIDOTOMY RIGHT, BILATERAL ANTERIOR ETHMOIDECTOMY, BILATERAL INFERIOR TURBINATE REDUCTION, BILATERAL MAXILLARY ANTROSTOMY AND SEPTOPLASTY, NASAL SCOPE    Surgeon(s):  Isabela Jones DO    Assistant:  * No surgical staff found *    Anesthesia: General    Estimated Blood Loss (mL): less than 50     Complications: None    Specimens:   ID Type Source Tests Collected by Time Destination   A : BILATERAL SINUS CONTENTS Tissue Tissue SURGICAL PATHOLOGY Isabela Jones DO 7/26/2021 1014        Implants:  * No implants in log *      Drains: * No LDAs found *    Findings: see op note    Electronically signed by Isabela Jones DO on 7/26/2021 at 10:36 AM

## 2021-07-26 NOTE — ANESTHESIA PRE PROCEDURE
Department of Anesthesiology  Preprocedure Note       Name:  Garth Pozo   Age:  27 y.o.  :  1995                                          MRN:  [id number]         Date:  2021      Surgeon: Ernestina Cramer):  Isabela Jones DO    Procedure: Procedure(s):  SPHENOIDOTOMY RIGHT, BILATERAL ANTERIOR ETHMOIDECTOMY, BILATERAL INFERIOR TURBINATE REDUCTION, BILATERAL MAXILLARY ANTROSTOMY AND SEPTOPLASTY, NASAL SCOPE    Medications prior to admission:   Prior to Admission medications    Medication Sig Start Date End Date Taking? Authorizing Provider   topiramate (TOPAMAX) 50 MG tablet Take 1 tablet by mouth 2 times daily 6/15/21  Yes Camille Petty MD   metFORMIN (GLUCOPHAGE) 500 MG tablet Take 500 mg by mouth 2 times daily (with meals)    Yes Historical Provider, MD   SUMAtriptan (IMITREX) 100 MG tablet TAKE 1 TABLET BY MOUTH ONCE AS NEEDED FOR MIGRAINE 21  Clyde Tompkins MD   acyclovir (ZOVIRAX) 400 MG tablet Take 400 mg by mouth 18   Historical Provider, MD       Current medications:    Current Facility-Administered Medications   Medication Dose Route Frequency Provider Last Rate Last Admin    lactated ringers infusion   Intravenous Continuous Shantelle Ledezma  mL/hr at 21 0819 New Bag at 21 0819    lactated ringers infusion   Intravenous Continuous Hollis Youngblood MD        sodium chloride flush 0.9 % injection 10 mL  10 mL Intravenous 2 times per day Hollis Youngblood MD        sodium chloride flush 0.9 % injection 10 mL  10 mL Intravenous PRN Hollis Youngblood MD        0.9 % sodium chloride infusion  25 mL Intravenous PRN Hollis Youngblood MD        lidocaine PF 1 % injection 1 mL  1 mL Intradermal Once PRN Hollis Youngblood MD           Allergies:     Allergies   Allergen Reactions    Nickel Rash     Skin began to excoriate    Lexapro [Escitalopram Oxalate]      Jittery and could not sleep       Problem List:    Patient Active Problem List   Diagnosis Code    Depression F32.9    Opioid dependence with withdrawal (HCC) F11.23    Cocaine abuse ([facility] F14.10    Heroin use F11.90    Suicidal ideation R45.851       Past Medical History:        Diagnosis Date    Anxiety     Atypical migraine     Bipolar 2 disorder (HCC)     Borderline personality disorder ([facility]     Depression     Gestational diabetes     H/O drug abuse ([facility]     hx of heroin/cocaine    IBS (irritable bowel syndrome)     Obese     PCOS (polycystic ovarian syndrome)        Past Surgical History:        Procedure Laterality Date    COLONOSCOPY      ENDOSCOPY, COLON, DIAGNOSTIC      WISDOM TOOTH EXTRACTION         Social History:    Social History     Tobacco Use    Smoking status: Never Smoker    Smokeless tobacco: Never Used   Substance Use Topics    Alcohol use: Not Currently                                Counseling given: Not Answered      Vital Signs (Current):   Vitals:    07/19/21 1304 07/26/21 0751   BP:  117/77   Pulse:  72   Resp:  18   Temp:  98.3 °F (36.8 °C)   TempSrc:  Oral   SpO2:  100%   Weight: 174 lb (78.9 kg) 175 lb (79.4 kg)   Height: 5' (1.524 m) 5' (1.524 m)                                              BP Readings from Last 3 Encounters:   07/26/21 117/77   07/12/21 121/84   07/06/21 118/80       NPO Status: Time of last liquid consumption: 2350                        Time of last solid consumption: 2350                        Date of last liquid consumption: 07/25/21                        Date of last solid food consumption: 07/25/21    BMI:   Wt Readings from Last 3 Encounters:   07/26/21 175 lb (79.4 kg)   07/06/21 176 lb 12.8 oz (80.2 kg)   06/25/21 179 lb (81.2 kg)     Body mass index is 34.18 kg/m².     CBC:   Lab Results   Component Value Date    WBC 7.2 07/06/2021    RBC 4.29 07/06/2021    HGB 12.9 07/06/2021    HCT 38.6 07/06/2021    MCV 89.9 07/06/2021    RDW 13.7 07/06/2021     07/06/2021       CMP:   Lab Results   Component Value Date     07/06/2021    K 3.9 07/06/2021     07/06/2021    CO2 18 07/06/2021    BUN 8 07/06/2021    CREATININE 0.6 07/06/2021    GFRAA >60 07/06/2021    GFRAA >60 02/23/2010    AGRATIO 1.2 02/01/2017    LABGLOM >60 07/06/2021    GLUCOSE 80 07/06/2021    PROT 7.8 07/06/2021    PROT 6.8 02/23/2010    CALCIUM 9.3 07/06/2021    BILITOT 0.3 07/06/2021    ALKPHOS 91 07/06/2021    AST 13 07/06/2021    ALT 16 07/06/2021       POC Tests: No results for input(s): POCGLU, POCNA, POCK, POCCL, POCBUN, POCHEMO, POCHCT in the last 72 hours. Coags: No results found for: PROTIME, INR, APTT    HCG (If Applicable):   Lab Results   Component Value Date    PREGTESTUR Negative 07/26/2021        ABGs: No results found for: PHART, PO2ART, PKK0DTW, OHY7JED, BEART, V2LAUXXT     Type & Screen (If Applicable):  No results found for: LABABO, LABRH    Drug/Infectious Status (If Applicable):  No results found for: HIV, HEPCAB    COVID-19 Screening (If Applicable):   Lab Results   Component Value Date    COVID19 Not Detected 07/21/2021           Anesthesia Evaluation  Patient summary reviewed no history of anesthetic complications:   Airway: Mallampati: IV  TM distance: >3 FB   Neck ROM: full  Mouth opening: > = 3 FB Dental: normal exam         Pulmonary:                              Cardiovascular:                      Neuro/Psych:   (+) headaches: migraine headaches, depression/anxiety    Psychiatric history: bipolar. ROS comment: Drug hx in the past GI/Hepatic/Renal:            ROS comment: IBS. Endo/Other:                      ROS comment: PCOS (polycystic ovarian syndrome Abdominal:             Vascular: Other Findings:             Anesthesia Plan      general     ASA 2       Induction: intravenous. Anesthetic plan and risks discussed with patient.                       Perla Mendieta MD   7/26/2021

## 2021-07-26 NOTE — H&P
Kole Vazquez    [id number]    [facility] Same Day Surgery Update H & P  Department of General Surgery   Surgical Service   Pre-operative History and Physical  Last H & P within the last 30 days. DIAGNOSIS:   Chronic maxillary sinusitis [J32.0]  Chronic sphenoidal sinusitis [J32.3]  Deviated nasal septum [J34.2]  Hypertrophy, nasal, turbinate [J34.3]    Procedure(s):  SPHENOIDOTOMY RIGHT, BILATERAL ANTERIOR ETHMOIDECTOMY, BILATERAL INFERIOR TURBINATE REDUCTION, BILATERAL MAXILLARY ANTROSTOMY AND SEPTOPLASTY, NASAL SCOPE     HISTORY OF PRESENT ILLNESS:   Patient with c/o nasal obstruction, nasal congestion and facial pain which has been unrelieved with medical therapy. Covid 19:  Patient denies fever, chills, cough or known exposure to Covid-19.        Past Medical History:        Diagnosis Date    Anxiety     Atypical migraine     Bipolar 2 disorder (HCC)     Borderline personality disorder ([facility]     Depression     H/O drug abuse (HCC)     hx of heroin/cocaine    IBS (irritable bowel syndrome)     Obese     PCOS (polycystic ovarian syndrome)      Past Surgical History:        Procedure Laterality Date    COLONOSCOPY      ENDOSCOPY, COLON, DIAGNOSTIC      WISDOM TOOTH EXTRACTION       Past Social History:  Social History     Socioeconomic History    Marital status: Life Partner     Spouse name: None    Number of children: None    Years of education: None    Highest education level: None   Occupational History     Comment: Refused to answer   Tobacco Use    Smoking status: Never Smoker    Smokeless tobacco: Never Used   Substance and Sexual Activity    Alcohol use: Yes     Comment: socially    Drug use: Not Currently     Comment: heroin-snort    Sexual activity: Yes     Partners: Male   Other Topics Concern    None   Social History Narrative    None     Social Determinants of Health     Financial Resource Strain: Low Risk     Difficulty of Paying Living Expenses: Not hard at all Food Insecurity: No Food Insecurity    Worried About Running Out of Food in the Last Year: Never true    Mike of Food in the Last Year: Never true   Transportation Needs: No Transportation Needs    Lack of Transportation (Medical): No    Lack of Transportation (Non-Medical): No   Physical Activity: Sufficiently Active    Days of Exercise per Week: 6 days    Minutes of Exercise per Session: 40 min   Stress: No Stress Concern Present    Feeling of Stress : Not at all   Social Connections: Moderately Isolated    Frequency of Communication with Friends and Family: Once a week    Frequency of Social Gatherings with Friends and Family: Twice a week    Attends Judaism Services: Never    Active Member of Clubs or Organizations: No    Attends Club or Organization Meetings: Never    Marital Status: Living with partner   Intimate Partner Violence:     Fear of Current or Ex-Partner:     Emotionally Abused:     Physically Abused:     Sexually Abused:          Medications Prior to Admission:      Prior to Admission medications    Medication Sig Start Date End Date Taking?  Authorizing Provider   topiramate (TOPAMAX) 50 MG tablet Take 1 tablet by mouth 2 times daily 6/15/21  Yes Marry Hoffmann MD   metFORMIN (GLUCOPHAGE) 500 MG tablet Take 500 mg by mouth 2 times daily (with meals)    Yes Historical Provider, MD   SUMAtriptan (IMITREX) 100 MG tablet TAKE 1 TABLET BY MOUTH ONCE AS NEEDED FOR MIGRAINE 7/20/21 7/20/21  Flex Reardon MD   acyclovir (ZOVIRAX) 400 MG tablet Take 400 mg by mouth 2/13/18   Historical Provider, MD         Allergies:  Nickel and Lexapro [escitalopram oxalate]    PHYSICAL EXAM:      /77   Pulse 72   Temp 98.3 °F (36.8 °C) (Oral)   Resp 18   Ht 5' (1.524 m)   Wt 175 lb (79.4 kg)   LMP 07/03/2021 (Exact Date)   SpO2 100%   BMI 34.18 kg/m²      Airway:  Airway patent with no audible stridor    Heart:  Regular rate and rhythm, No murmur noted    Lungs:  No increased work of breathing, good air exchange, clear to auscultation bilaterally, no crackles or wheezing    Abdomen:  Soft, non-distended, non-tender, no rebound tenderness or guarding, and no masses palpated    ASSESSMENT AND PLAN     Patient is a 27 y.o. female with above specified procedure planned. 1.  The patients history and physical was obtained and signed off by the pre-admission testing department. Patient seen and focused exam done today- no new changes since last physical exam on 7/6/21    2. Access to ancillary services are available per request of the provider.     Radha Antoine, APRN - CNP     7/26/2021

## 2021-07-27 ENCOUNTER — TELEPHONE (OUTPATIENT)
Dept: PRIMARY CARE CLINIC | Age: 26
End: 2021-07-27

## 2021-07-27 NOTE — TELEPHONE ENCOUNTER
[facility] Initial Follow Up Call    Outreach made within 2 business days of discharge: Yes    Patient: Chris Uriostegui Patient : 1995   MRN: <[id number]>  Reason for Admission: There are no discharge diagnoses documented for the most recent discharge.   Discharge Date: 21    Pt is being followed by ENT        Follow Up  Future Appointments   Date Time Provider Elan Gloria   8/3/2021  1:20 PM Yulia Osorio DO [facility]   2021 11:00 AM Lisset Rhodes  [place], [place]

## 2021-07-28 ENCOUNTER — TELEPHONE (OUTPATIENT)
Dept: ENT CLINIC | Age: 26
End: 2021-07-28

## 2021-07-28 NOTE — TELEPHONE ENCOUNTER
Please call pt she has a few concerns: numbness above teeth in the gums/ no air out of right nasal please advise  Thank you

## 2021-07-28 NOTE — TELEPHONE ENCOUNTER
Spoke with patient and her front teeth are numb and there is blockage in right nostril, please advise

## 2021-07-29 NOTE — TELEPHONE ENCOUNTER
Teeth numbness is a normal side effect and should resolve by 3 months. The nasal splint may be blocked with mucus or blood. Continue nasal saline. May gently blow nose.

## 2021-08-03 ENCOUNTER — OFFICE VISIT (OUTPATIENT)
Dept: ENT CLINIC | Age: 26
End: 2021-08-03
Payer: COMMERCIAL

## 2021-08-03 VITALS — BODY MASS INDEX: 33.77 KG/M2 | WEIGHT: 172 LBS | HEIGHT: 60 IN

## 2021-08-03 DIAGNOSIS — J32.0 CHRONIC MAXILLARY SINUSITIS: ICD-10-CM

## 2021-08-03 DIAGNOSIS — J34.3 NASAL TURBINATE HYPERTROPHY: ICD-10-CM

## 2021-08-03 DIAGNOSIS — J34.2 DNS (DEVIATED NASAL SEPTUM): ICD-10-CM

## 2021-08-03 DIAGNOSIS — J30.9 ALLERGIC RHINITIS, UNSPECIFIED SEASONALITY, UNSPECIFIED TRIGGER: Primary | ICD-10-CM

## 2021-08-03 DIAGNOSIS — J32.3 CHRONIC SPHENOIDAL SINUSITIS: ICD-10-CM

## 2021-08-03 PROCEDURE — 31237 NSL/SINS NDSC SURG BX POLYPC: CPT | Performed by: OTOLARYNGOLOGY

## 2021-08-03 PROCEDURE — G8427 DOCREV CUR MEDS BY ELIG CLIN: HCPCS | Performed by: OTOLARYNGOLOGY

## 2021-08-03 PROCEDURE — 1036F TOBACCO NON-USER: CPT | Performed by: OTOLARYNGOLOGY

## 2021-08-03 PROCEDURE — 99024 POSTOP FOLLOW-UP VISIT: CPT | Performed by: OTOLARYNGOLOGY

## 2021-08-03 PROCEDURE — G8417 CALC BMI ABV UP PARAM F/U: HCPCS | Performed by: OTOLARYNGOLOGY

## 2021-08-03 RX ORDER — EPINEPHRINE 0.3 MG/.3ML
INJECTION SUBCUTANEOUS
Qty: 1 EACH | Refills: 1 | Status: SHIPPED | OUTPATIENT
Start: 2021-08-03

## 2021-08-03 ASSESSMENT — ENCOUNTER SYMPTOMS
TROUBLE SWALLOWING: 0
SORE THROAT: 0
DIARRHEA: 0
EYE ITCHING: 0
RHINORRHEA: 0
EYE PAIN: 0
COUGH: 0
COLOR CHANGE: 0
FACIAL SWELLING: 0
CHOKING: 0
EYE REDNESS: 0
PHOTOPHOBIA: 0
VOICE CHANGE: 0
NAUSEA: 0
SHORTNESS OF BREATH: 0
SINUS PRESSURE: 0
STRIDOR: 0
SINUS PAIN: 0

## 2021-08-03 NOTE — PROGRESS NOTES
[facility]  [address], [address] [facility], [address]  P: [phone]  F: [phone]       Patient     Monico Patrick  1995    ChiefComplaint     Chief Complaint   Patient presents with    Post-Op Check     Patient is here today for her post-op visit, she still is congested, there is some numbness around her teeth, little bit of blood when she gently blows and drainage       History of Present Illness     Monico Patrick is a pleasant 27 y.o. female here for 1 week postop visit for septoplasty, turbinate reduction and endoscopic sinus surgery. Overall doing well. Feeling some congestion from the splints. Has been having some bloody drainage that is improved over the past few days. Additionally, patient underwent allergy testing which showed significant allergies. Is interested in beginning immunotherapy.     Past Medical History     Past Medical History:   Diagnosis Date    Anxiety     Atypical migraine     Bipolar 2 disorder (HCC)     Borderline personality disorder ([facility]     Depression     Gestational diabetes     H/O drug abuse (HCC)     hx of heroin/cocaine    IBS (irritable bowel syndrome)     Obese     PCOS (polycystic ovarian syndrome)        Past Surgical History     Past Surgical History:   Procedure Laterality Date    COLONOSCOPY      ENDOSCOPY, COLON, DIAGNOSTIC      SEPTOPLASTY N/A 7/26/2021    SPHENOIDOTOMY RIGHT, BILATERAL ANTERIOR ETHMOIDECTOMY, BILATERAL INFERIOR TURBINATE REDUCTION, BILATERAL MAXILLARY ANTROSTOMY AND SEPTOPLASTY, NASAL SCOPE performed by Azeb Vu DO at [address] EXTRACTION         Family History     Family History   Problem Relation Age of Onset    Other Maternal Grandmother         demetia    Heart Disease Maternal Grandmother     High Cholesterol Maternal Grandfather     Diabetes Maternal Grandfather     High Blood Pressure Maternal Grandfather     Heart Disease Maternal Grandfather        Social History     Social History     Socioeconomic History    Marital status: Life Partner     Spouse name: Not on file    Number of children: Not on file    Years of education: Not on file    Highest education level: Not on file   Occupational History     Comment: Refused to answer   Tobacco Use    Smoking status: Never Smoker    Smokeless tobacco: Never Used   Vaping Use    Vaping Use: Never used   Substance and Sexual Activity    Alcohol use: Not Currently    Drug use: Not Currently     Comment: heroin-snort 5 years ago    Sexual activity: Yes     Partners: Male   Other Topics Concern    Not on file   Social History Narrative    Not on file     Social Determinants of Health     Financial Resource Strain: Low Risk     Difficulty of Paying Living Expenses: Not hard at all   Food Insecurity: No Food Insecurity    Worried About [address] [place] [address] [employer] in the Last Year: Never true    Mike of Food in the Last Year: Never true   Transportation Needs: No Transportation Needs    Lack of Transportation (Medical): No    Lack of Transportation (Non-Medical): No   Physical Activity: Sufficiently Active    Days of Exercise per Week: 6 days    Minutes of Exercise per Session: 40 min   Stress: No Stress Concern Present    Feeling of Stress : Not at all   Social Connections: Moderately Isolated    Frequency of Communication with Friends and Family: Once a week    Frequency of Social Gatherings with Friends and Family: Twice a week    Attends Christianity Services: Never    Active Member of Clubs or Organizations: No    Attends Club or Organization Meetings: Never    Marital Status: Living with partner   Intimate Partner Violence:     Fear of Current or Ex-Partner:     Emotionally Abused:     Physically Abused:     Sexually Abused:         Allergies     Allergies   Allergen Reactions    Nickel Rash     Skin began to excoriate    Lexapro [Escitalopram Oxalate]      Jittery and could not sleep       Medications Current Outpatient Medications   Medication Sig Dispense Refill    EPINEPHrine (EPIPEN) 0.3 MG/0.3ML SOAJ injection Use as directed for allergic reaction 1 each 1    acetaminophen (APAP EXTRA STRENGTH) 500 MG tablet Take 1 tablet by mouth every 6 hours as needed for Pain Alternate every 3 hours with ibuprofen 60 tablet 0    topiramate (TOPAMAX) 50 MG tablet Take 1 tablet by mouth 2 times daily 60 tablet 2    metFORMIN (GLUCOPHAGE) 500 MG tablet Take 500 mg by mouth 2 times daily (with meals)       acyclovir (ZOVIRAX) 400 MG tablet Take 400 mg by mouth      SUMAtriptan (IMITREX) 100 MG tablet TAKE 1 TABLET BY MOUTH ONCE AS NEEDED FOR MIGRAINE 12 tablet 1     No current facility-administered medications for this visit. Review of Systems     Review of Systems   Constitutional: Negative for chills, fatigue and fever. HENT: Positive for congestion and postnasal drip. Negative for ear discharge, ear pain, facial swelling, hearing loss, nosebleeds, rhinorrhea, sinus pressure, sinus pain, sneezing, sore throat, tinnitus, trouble swallowing and voice change. Eyes: Negative for photophobia, pain, redness, itching and visual disturbance. Respiratory: Negative for cough, choking, shortness of breath and stridor. Gastrointestinal: Negative for diarrhea and nausea. Musculoskeletal: Negative for neck pain and neck stiffness. Skin: Negative for color change and rash. Neurological: Negative for dizziness, facial asymmetry and light-headedness. Hematological: Negative for adenopathy. Psychiatric/Behavioral: Negative for agitation and confusion. PhysicalExam     There were no vitals filed for this visit. Physical Exam  Constitutional:       Appearance: She is well-developed. HENT:      Head: Normocephalic and atraumatic. Jaw: No trismus. Right Ear: Tympanic membrane, ear canal and external ear normal. No drainage. No middle ear effusion. Tympanic membrane is not perforated. Left Ear: Tympanic membrane, ear canal and external ear normal. No drainage. No middle ear effusion. Tympanic membrane is not perforated. Nose: No septal deviation, mucosal edema or rhinorrhea. Mouth/Throat:      Dentition: Normal dentition. Pharynx: Uvula midline. No oropharyngeal exudate. Eyes:      General: No scleral icterus. Right eye: No discharge. Left eye: No discharge. Pupils: Pupils are equal, round, and reactive to light. Neck:      Thyroid: No thyromegaly. Trachea: Phonation normal. No tracheal deviation. Pulmonary:      Effort: Pulmonary effort is normal. No respiratory distress. Breath sounds: No stridor. Musculoskeletal:      Cervical back: Neck supple. Lymphadenopathy:      Cervical: No cervical adenopathy. Skin:     General: Skin is warm and dry. Neurological:      Mental Status: She is alert and oriented to person, place, and time. Cranial Nerves: No cranial nerve deficit. Psychiatric:         Behavior: Behavior normal.           Procedure       Nasal Endoscopy with Debridement  Pre Op Dx: Nasal congestion, post operative sinus surgery  Post Op Dx: Same  Procedure: Nasal endoscopy with debridement  Anesthesia: 2% lidocaine with afrin tiopical  EBL: None  Specimens: None  Findings: Bilateral nasal cavity and sinus crusting and mucus. Procedure:    After the application of afrin and pontocaine the nasal sinus cavity was debrided utilizing a zero degree adrian endoscope with Kerrison rongeurs and boyer suctions on both sides. The sinus lining was healing well. No evidence of bleeding or rhinorrhea was noted. Tolerated well. Complications: None        Assessment and Plan     1. Allergic rhinitis, unspecified seasonality, unspecified trigger    - MD INJECTION,THERAP/PROPH/DIAGNOST, IM OR SUBCUT  - EPINEPHrine (EPIPEN) 0.3 MG/0.3ML SOAJ injection; Use as directed for allergic reaction  Dispense: 1 each; Refill: 1    2.  Chronic maxillary sinusitis  Patient here for 1 week postop visit for endoscopic sinus surgery, septoplasty and turbinate reduction. Sinuses healing well. Debridement performed in the office today. Hoffman splints removed. Septum midline. Continue saline rinses for 2 weeks. Follow-up 2 weeks. May begin immunotherapy after next follow-up visit. EpiPen order placed. Return to normal activity after this weekend. 3. Chronic sphenoidal sinusitis      4. DNS (deviated nasal septum)      5. Nasal turbinate hypertrophy        Return in about 2 weeks (around 8/17/2021). Portions of this note were dictated using Dragon.  There may be linguistic errors secondary to the use of this program.

## 2021-08-16 ENCOUNTER — TELEPHONE (OUTPATIENT)
Dept: ENT CLINIC | Age: 26
End: 2021-08-16

## 2021-08-16 NOTE — TELEPHONE ENCOUNTER
Please call pt she is ready to schedule allergy shots ara routed this msg in error and resent to allergy nurse

## 2021-08-17 ENCOUNTER — OFFICE VISIT (OUTPATIENT)
Dept: ENT CLINIC | Age: 26
End: 2021-08-17
Payer: COMMERCIAL

## 2021-08-17 VITALS — HEIGHT: 60 IN | WEIGHT: 172 LBS | BODY MASS INDEX: 33.77 KG/M2

## 2021-08-17 DIAGNOSIS — J32.0 CHRONIC MAXILLARY SINUSITIS: ICD-10-CM

## 2021-08-17 DIAGNOSIS — J34.2 DNS (DEVIATED NASAL SEPTUM): ICD-10-CM

## 2021-08-17 DIAGNOSIS — J33.9 NASAL POLYPOSIS: Primary | ICD-10-CM

## 2021-08-17 DIAGNOSIS — J32.3 CHRONIC SPHENOIDAL SINUSITIS: ICD-10-CM

## 2021-08-17 DIAGNOSIS — J34.3 NASAL TURBINATE HYPERTROPHY: ICD-10-CM

## 2021-08-17 PROCEDURE — 99024 POSTOP FOLLOW-UP VISIT: CPT | Performed by: OTOLARYNGOLOGY

## 2021-08-17 PROCEDURE — 31237 NSL/SINS NDSC SURG BX POLYPC: CPT | Performed by: OTOLARYNGOLOGY

## 2021-08-17 RX ORDER — PREDNISONE 10 MG/1
TABLET ORAL
Qty: 34 TABLET | Refills: 0 | Status: SHIPPED | OUTPATIENT
Start: 2021-08-17 | End: 2021-09-24

## 2021-08-17 RX ORDER — FLUTICASONE PROPIONATE 50 MCG
2 SPRAY, SUSPENSION (ML) NASAL DAILY
Qty: 2 BOTTLE | Refills: 5 | Status: SHIPPED | OUTPATIENT
Start: 2021-08-17 | End: 2022-06-29

## 2021-08-17 ASSESSMENT — ENCOUNTER SYMPTOMS
VOICE CHANGE: 0
DIARRHEA: 0
PHOTOPHOBIA: 0
RHINORRHEA: 0
COUGH: 0
SHORTNESS OF BREATH: 0
CHOKING: 0
STRIDOR: 0
EYE REDNESS: 0
SINUS PRESSURE: 0
SORE THROAT: 0
COLOR CHANGE: 0
SINUS PAIN: 0
TROUBLE SWALLOWING: 0
FACIAL SWELLING: 0
EYE ITCHING: 0
EYE PAIN: 0
NAUSEA: 0

## 2021-08-17 NOTE — PROGRESS NOTES
[facility]  [address] [facility], [address] [place] [address], [facility] [address]  P: [phone]  F: [phone]       Patient     Chris Uriostegui  1995    ChiefComplaint     Chief Complaint   Patient presents with    Post-Op Check     Patient is here today for her second post-op visit, she has a sensitive area at the entrance of her left nostril but breathing is so much better       History of Present Illness     Chris Uriostegui is a pleasant 27 y.o. female 3-week postop visit for septoplasty, turbinate reduction, endoscopic sinus surgery. She is overall doing very well. She has slight sensitivity in the tip of her nose around the columella on the left. She is also been experiencing some sinus pressure and headaches recently. She has been using rinse as instructed.     Past Medical History     Past Medical History:   Diagnosis Date    Anxiety     Atypical migraine     Bipolar 2 disorder (HCC)     Borderline personality disorder ([address]     Depression     Gestational diabetes     H/O drug abuse (HCC)     hx of heroin/cocaine    IBS (irritable bowel syndrome)     Obese     PCOS (polycystic ovarian syndrome)        Past Surgical History     Past Surgical History:   Procedure Laterality Date    COLONOSCOPY      ENDOSCOPY, COLON, DIAGNOSTIC      SEPTOPLASTY N/A 7/26/2021    SPHENOIDOTOMY RIGHT, BILATERAL ANTERIOR ETHMOIDECTOMY, BILATERAL INFERIOR TURBINATE REDUCTION, BILATERAL MAXILLARY ANTROSTOMY AND SEPTOPLASTY, NASAL SCOPE performed by Yulia Osorio DO at [address] EXTRACTION         Family History     Family History   Problem Relation Age of Onset    Other Maternal Grandmother         demetia    Heart Disease Maternal Grandmother     High Cholesterol Maternal Grandfather     Diabetes Maternal Grandfather     High Blood Pressure Maternal Grandfather     Heart Disease Maternal Grandfather        Social History     Social History     Socioeconomic History    Marital status: fluticasone (FLONASE) 50 MCG/ACT nasal spray 2 sprays by Nasal route daily 2 Bottle 5    predniSONE (DELTASONE) 10 MG tablet 4 tablets daily for 7 days, 3 tablets daily for 1 day, 2 tablets daily for 1 day, 1 tablet daily for 1 day 34 tablet 0    EPINEPHrine (EPIPEN) 0.3 MG/0.3ML SOAJ injection Use as directed for allergic reaction 1 each 1    topiramate (TOPAMAX) 50 MG tablet Take 1 tablet by mouth 2 times daily 60 tablet 2    metFORMIN (GLUCOPHAGE) 500 MG tablet Take 500 mg by mouth 2 times daily (with meals)       acyclovir (ZOVIRAX) 400 MG tablet Take 400 mg by mouth      acetaminophen (APAP EXTRA STRENGTH) 500 MG tablet Take 1 tablet by mouth every 6 hours as needed for Pain Alternate every 3 hours with ibuprofen 60 tablet 0    SUMAtriptan (IMITREX) 100 MG tablet TAKE 1 TABLET BY MOUTH ONCE AS NEEDED FOR MIGRAINE 12 tablet 1     No current facility-administered medications for this visit. Review of Systems     Review of Systems   Constitutional: Negative for chills, fatigue and fever. HENT: Negative for congestion, ear discharge, ear pain, facial swelling, hearing loss, nosebleeds, postnasal drip, rhinorrhea, sinus pressure, sinus pain, sneezing, sore throat, tinnitus, trouble swallowing and voice change. Eyes: Negative for photophobia, pain, redness, itching and visual disturbance. Respiratory: Negative for cough, choking, shortness of breath and stridor. Gastrointestinal: Negative for diarrhea and nausea. Musculoskeletal: Negative for neck pain and neck stiffness. Skin: Negative for color change and rash. Neurological: Negative for dizziness, facial asymmetry and light-headedness. Hematological: Negative for adenopathy. Psychiatric/Behavioral: Negative for agitation and confusion. PhysicalExam     There were no vitals filed for this visit. Physical Exam  Constitutional:       Appearance: She is well-developed. HENT:      Head: Normocephalic and atraumatic. Jaw: No trismus. Right Ear: Tympanic membrane, ear canal and external ear normal. No drainage. No middle ear effusion. Tympanic membrane is not perforated. Left Ear: Tympanic membrane, ear canal and external ear normal. No drainage. No middle ear effusion. Tympanic membrane is not perforated. Nose: No septal deviation, mucosal edema or rhinorrhea. Mouth/Throat:      Dentition: Normal dentition. Pharynx: Uvula midline. No oropharyngeal exudate. Eyes:      General: No scleral icterus. Right eye: No discharge. Left eye: No discharge. Pupils: Pupils are equal, round, and reactive to light. Neck:      Thyroid: No thyromegaly. Trachea: Phonation normal. No tracheal deviation. Pulmonary:      Effort: Pulmonary effort is normal. No respiratory distress. Breath sounds: No stridor. Musculoskeletal:      Cervical back: Neck supple. Lymphadenopathy:      Cervical: No cervical adenopathy. Skin:     General: Skin is warm and dry. Neurological:      Mental Status: She is alert and oriented to person, place, and time. Cranial Nerves: No cranial nerve deficit. Psychiatric:         Behavior: Behavior normal.           Procedure       Nasal Endoscopy with Debridement  Pre Op Dx: Nasal congestion, post operative sinus surgery  Post Op Dx: Same  Procedure: Nasal endoscopy with debridement  Anesthesia: 2% lidocaine with afrin tiopical  EBL: None  Specimens: None  Findings: Bilateral nasal cavity and sinus crusting and mucus. Procedure:    After the application of afrin and pontocaine the nasal sinus cavity was debrided utilizing a zero degree adrian endoscope with Kerrison rongeurs and boyer suctions on both sides. The sinus lining was healing well. There is evidence of polypoid inflammation in the right ethmoid cavity which is new. No evidence of bleeding or rhinorrhea was noted. Tolerated well. Complications: None        Assessment and Plan     1. Nasal polyposis  Patient with new polypoid inflammation in the right ethmoid cavity. Sinus cavities debrided bilaterally today. We will do a course of prednisone and begin fluticasone twice a day. Risks of prednisone discussed. Proper administration discussed. Additionally, proper ministration of fluticasone discussed. Follow-up in 1 month. - fluticasone (FLONASE) 50 MCG/ACT nasal spray; 2 sprays by Nasal route daily  Dispense: 2 Bottle; Refill: 5  - predniSONE (DELTASONE) 10 MG tablet; 4 tablets daily for 7 days, 3 tablets daily for 1 day, 2 tablets daily for 1 day, 1 tablet daily for 1 day  Dispense: 34 tablet; Refill: 0    2. Chronic maxillary sinusitis      3. Chronic sphenoidal sinusitis      4. DNS (deviated nasal septum)      5. Nasal turbinate hypertrophy        Return in about 4 weeks (around 9/14/2021). Portions of this note were dictated using Dragon.  There may be linguistic errors secondary to the use of this program.

## 2021-08-20 ENCOUNTER — NURSE ONLY (OUTPATIENT)
Dept: ENT CLINIC | Age: 26
End: 2021-08-20
Payer: COMMERCIAL

## 2021-08-20 DIAGNOSIS — J30.9 ALLERGIC RHINITIS, UNSPECIFIED SEASONALITY, UNSPECIFIED TRIGGER: ICD-10-CM

## 2021-08-20 PROCEDURE — 95165 ANTIGEN THERAPY SERVICES: CPT | Performed by: OTOLARYNGOLOGY

## 2021-08-20 NOTE — PROGRESS NOTES
Total vials prepared: 2 containing 10 doses each vial. First vial contains Pollens and other vial contains Mites, Molds, Epithelia, and Insects (M,M,Epi,I). Allergy extract was prepared according to written prescription by provider. Provider onsite during allergy extract preparation. Patient vials labeled with name, date of birth, vial number, Pollen or M M Epi I, and expiration date. Injections are given weekly according to provider orders and injections are built according to patient tolerance to achieve a goal of maintenance. See media scan for Prescription Treatment Set. See allergy flowsheets for injection documentation from each visit.

## 2021-08-23 ENCOUNTER — NURSE ONLY (OUTPATIENT)
Dept: ENT CLINIC | Age: 26
End: 2021-08-23
Payer: COMMERCIAL

## 2021-08-23 DIAGNOSIS — J30.9 ALLERGIC RHINITIS, UNSPECIFIED SEASONALITY, UNSPECIFIED TRIGGER: ICD-10-CM

## 2021-08-23 PROCEDURE — 95117 IMMUNOTHERAPY INJECTIONS: CPT | Performed by: OTOLARYNGOLOGY

## 2021-08-23 NOTE — PROGRESS NOTES
Correct serum vials verified by patient and nurse. Consent obtained and SVT completed. SVT P=6mm wheal, M=7mm wheal.  After obtaining consent, and per orders of Dr Tong Barahona, injections of allergy serum given by MADHAV Faria RN. Patient instructed to remain in clinic for 30 minutes after injection, and to report any adverse reactions to me immediately. No change in patient status post injection. Allergy Education Folder given to patient and reviewed by myself. Patient instructed on use of EpiPen, signs of allergic reaction, and rationale for bringing EpiPen to each appointment. Patient verbalized understanding and provided accurate return demonstration of EpiPen use with  pen.

## 2021-08-24 ENCOUNTER — OFFICE VISIT (OUTPATIENT)
Dept: PRIMARY CARE CLINIC | Age: 26
End: 2021-08-24
Payer: COMMERCIAL

## 2021-08-24 VITALS
TEMPERATURE: 97.4 F | BODY MASS INDEX: 34.91 KG/M2 | DIASTOLIC BLOOD PRESSURE: 78 MMHG | SYSTOLIC BLOOD PRESSURE: 114 MMHG | WEIGHT: 177.8 LBS | OXYGEN SATURATION: 100 % | HEIGHT: 60 IN | HEART RATE: 93 BPM

## 2021-08-24 DIAGNOSIS — G43.819 OTHER MIGRAINE WITHOUT STATUS MIGRAINOSUS, INTRACTABLE: ICD-10-CM

## 2021-08-24 PROCEDURE — G8417 CALC BMI ABV UP PARAM F/U: HCPCS | Performed by: FAMILY MEDICINE

## 2021-08-24 PROCEDURE — 1036F TOBACCO NON-USER: CPT | Performed by: FAMILY MEDICINE

## 2021-08-24 PROCEDURE — G8427 DOCREV CUR MEDS BY ELIG CLIN: HCPCS | Performed by: FAMILY MEDICINE

## 2021-08-24 PROCEDURE — 99214 OFFICE O/P EST MOD 30 MIN: CPT | Performed by: FAMILY MEDICINE

## 2021-08-24 RX ORDER — RIZATRIPTAN BENZOATE 10 MG/1
10 TABLET ORAL
Qty: 12 TABLET | Refills: 3 | Status: SHIPPED | OUTPATIENT
Start: 2021-08-24 | End: 2021-11-30

## 2021-08-24 RX ORDER — TOPIRAMATE 100 MG/1
100 TABLET, FILM COATED ORAL 2 TIMES DAILY
Qty: 60 TABLET | Refills: 2 | Status: SHIPPED | OUTPATIENT
Start: 2021-08-24 | End: 2021-10-14

## 2021-08-24 ASSESSMENT — PATIENT HEALTH QUESTIONNAIRE - PHQ9
1. LITTLE INTEREST OR PLEASURE IN DOING THINGS: NOT AT ALL
SUM OF ALL RESPONSES TO PHQ9 QUESTIONS 1 & 2: 0
DEPRESSION UNABLE TO ASSESS: YES
2. FEELING DOWN, DEPRESSED OR HOPELESS: NOT AT ALL

## 2021-08-24 ASSESSMENT — ENCOUNTER SYMPTOMS
RESPIRATORY NEGATIVE: 1
EYES NEGATIVE: 1
GASTROINTESTINAL NEGATIVE: 1

## 2021-08-24 NOTE — PROGRESS NOTES
SUBJECTIVE:  Patient ID: Lizeth Agrawal is a 27 y.o. y.o. female     HPI    Headache: Patient presents for follow up onheadache, her migraine headache is getting worse Maxalt was not helping much Imitrex was sent to the wrong pharmacy could not get it symptoms began about several months ago. Generally, the headaches last about a few hours and occur frequent, daily. The headache do not seem to be related to any time of the day. The headaches are usually sharp and throbbing and are right-sided unilateral, left-sided unilateral in location. The patient rates her most severe headaches a 8 on a scale from 1 to 10. Recently, the headaches significantly improved on the Topamax still to having 3-4 headaches. School attendance or other daily activities are affected by the headaches. Precipitating factors include none which have been determined. The headaches are usually not preceded by an aura. Associated neurologic symptoms which are present include: decreased physical activity. The patient denies depression, loss of balance, muscle weakness, numbness of extremities, vomiting in the early morning and worsening school/work performance. Other associated symptoms include: nothing pertinent. Symptoms which are not present include: conjunctivitis, diarrhea, ear pulling, fever, hoarseness, nasal congestion, nausea, photophobia and rash.   Tolerating Topamax well with no side effect  With her decrease in the frequency of headaches seems slightly got worse she did her sinus surgery which had helped some with the sinus pressure she is getting almost daily headache  Past Medical History:   Diagnosis Date    Anxiety     Atypical migraine     Bipolar 2 disorder (HCC)     Borderline personality disorder ([facility] [address]     Depression     Gestational diabetes     H/O drug abuse ([facility] [address]     hx of heroin/cocaine    IBS (irritable bowel syndrome)     Obese     PCOS (polycystic ovarian syndrome)       Past Surgical History:   Procedure Laterality Date    COLONOSCOPY      ENDOSCOPY, COLON, DIAGNOSTIC      SEPTOPLASTY N/A 7/26/2021    SPHENOIDOTOMY RIGHT, BILATERAL ANTERIOR ETHMOIDECTOMY, BILATERAL INFERIOR TURBINATE REDUCTION, BILATERAL MAXILLARY ANTROSTOMY AND SEPTOPLASTY, NASAL SCOPE performed by Maria Antonia Padron DO at [address] EXTRACTION       Family History   Problem Relation Age of Onset    Other Maternal Grandmother         demetia    Heart Disease Maternal Grandmother     High Cholesterol Maternal Grandfather     Diabetes Maternal Grandfather     High Blood Pressure Maternal Grandfather     Heart Disease Maternal Grandfather      Social History     Socioeconomic History    Marital status: Life Partner     Spouse name: Not on file    Number of children: Not on file    Years of education: Not on file    Highest education level: Not on file   Occupational History     Comment: Refused to answer   Tobacco Use    Smoking status: Never Smoker    Smokeless tobacco: Never Used   Vaping Use    Vaping Use: Never used   Substance and Sexual Activity    Alcohol use: Not Currently    Drug use: Not Currently     Comment: heroin-snort 5 years ago    Sexual activity: Yes     Partners: Male   Other Topics Concern    Not on file   Social History Narrative    Not on file     Social Determinants of Health     Financial Resource Strain: Low Risk     Difficulty of Paying Living Expenses: Not hard at all   Food Insecurity: No Food Insecurity    Worried About Running Out of Food in the Last Year: Never true    Mike of Food in the Last Year: Never true   Transportation Needs: No Transportation Needs    Lack of Transportation (Medical): No    Lack of Transportation (Non-Medical): No   Physical Activity: Sufficiently Active    Days of Exercise per Week: 6 days    Minutes of Exercise per Session: 40 min   Stress: No Stress Concern Present    Feeling of Stress : Not at all   Social Connections:  Moderately Isolated    Frequency of Communication with Friends and Family: Once a week    Frequency of Social Gatherings with Friends and Family: Twice a week    Attends Shinto Services: Never    Active Member of Clubs or Organizations: No    Attends Club or Organization Meetings: Never    Marital Status: Living with partner   Intimate Partner Violence:     Fear of Current or Ex-Partner:     Emotionally Abused:     Physically Abused:     Sexually Abused:      Current Outpatient Medications   Medication Sig Dispense Refill    fluticasone (FLONASE) 50 MCG/ACT nasal spray 2 sprays by Nasal route daily 2 Bottle 5    predniSONE (DELTASONE) 10 MG tablet 4 tablets daily for 7 days, 3 tablets daily for 1 day, 2 tablets daily for 1 day, 1 tablet daily for 1 day 34 tablet 0    EPINEPHrine (EPIPEN) 0.3 MG/0.3ML SOAJ injection Use as directed for allergic reaction 1 each 1    topiramate (TOPAMAX) 50 MG tablet Take 1 tablet by mouth 2 times daily 60 tablet 2    metFORMIN (GLUCOPHAGE) 500 MG tablet Take 500 mg by mouth 2 times daily (with meals)       acyclovir (ZOVIRAX) 400 MG tablet Take 400 mg by mouth      acetaminophen (APAP EXTRA STRENGTH) 500 MG tablet Take 1 tablet by mouth every 6 hours as needed for Pain Alternate every 3 hours with ibuprofen 60 tablet 0    SUMAtriptan (IMITREX) 100 MG tablet TAKE 1 TABLET BY MOUTH ONCE AS NEEDED FOR MIGRAINE 12 tablet 1     No current facility-administered medications for this visit. Allergies   Allergen Reactions    Nickel Rash     Skin began to excoriate    Lexapro [Escitalopram Oxalate]      Jittery and could not sleep       Review of Systems   Constitutional: Negative. HENT: Negative. Eyes: Negative. Respiratory: Negative. Cardiovascular: Negative. Gastrointestinal: Negative. All other systems reviewed and are negative.       OBJECTIVE:  /78 (Site: Left Upper Arm, Position: Sitting, Cuff Size: Small Adult)   Pulse 93   Temp 97.4 °F (36.3 °C) (Oral)   Ht 5' (1.524 m)   Wt 177 lb 12.8 oz (80.6 kg)   LMP 08/10/2021 (Exact Date)   SpO2 100%   BMI 34.72 kg/m²     Physical Exam  Vitals reviewed. Constitutional:       Appearance: Normal appearance. HENT:      Head: Normocephalic and atraumatic. Nose: Nose normal.   Eyes:      General: No scleral icterus. Conjunctiva/sclera: Conjunctivae normal.   Neck:      Thyroid: No thyromegaly. Vascular: No JVD. Cardiovascular:      Rate and Rhythm: Normal rate and regular rhythm. Heart sounds: Normal heart sounds. No murmur heard. No friction rub. No gallop. Pulmonary:      Effort: Pulmonary effort is normal.      Breath sounds: Normal breath sounds. No wheezing or rales. Abdominal:      General: Bowel sounds are normal. There is no distension. Palpations: Abdomen is soft. There is no mass. Tenderness: There is no abdominal tenderness. Hernia: No hernia is present. Lymphadenopathy:      Cervical: No cervical adenopathy. Skin:     Findings: No rash. Neurological:      Mental Status: She is alert and oriented to person, place, and time. ASSESSMENT:   Diagnosis Orders   1.  Other migraine without status migrainosus, intractable         PLAN:  Increase Topamax 100 twice daily  Trial of Maxalt  Increase hydration  Keep diary of headaches  Close follow-up

## 2021-08-30 ENCOUNTER — NURSE ONLY (OUTPATIENT)
Dept: ENT CLINIC | Age: 26
End: 2021-08-30
Payer: COMMERCIAL

## 2021-08-30 DIAGNOSIS — J30.9 ALLERGIC RHINITIS, UNSPECIFIED SEASONALITY, UNSPECIFIED TRIGGER: ICD-10-CM

## 2021-08-30 DIAGNOSIS — G43.819 OTHER MIGRAINE WITHOUT STATUS MIGRAINOSUS, INTRACTABLE: ICD-10-CM

## 2021-08-30 PROCEDURE — 95117 IMMUNOTHERAPY INJECTIONS: CPT | Performed by: OTOLARYNGOLOGY

## 2021-08-30 RX ORDER — SUMATRIPTAN 100 MG/1
100 TABLET, FILM COATED ORAL
Qty: 12 TABLET | Refills: 1 | Status: SHIPPED | OUTPATIENT
Start: 2021-08-30 | End: 2021-10-28

## 2021-08-30 NOTE — PROGRESS NOTES
Correct serum vials verified by patient and nurse. After obtaining consent, and per orders of Dr Zulema Myers, injections of allergy serum given by MADHAV Faria RN. Patient instructed to remain in clinic for 30 minutes after injection, and to report any adverse reactions to me immediately. No change in patient status post injection.

## 2021-09-10 ENCOUNTER — TELEPHONE (OUTPATIENT)
Dept: ENT CLINIC | Age: 26
End: 2021-09-10

## 2021-09-20 ENCOUNTER — NURSE ONLY (OUTPATIENT)
Dept: ENT CLINIC | Age: 26
End: 2021-09-20
Payer: COMMERCIAL

## 2021-09-20 DIAGNOSIS — J30.9 ALLERGIC RHINITIS, UNSPECIFIED SEASONALITY, UNSPECIFIED TRIGGER: ICD-10-CM

## 2021-09-20 PROCEDURE — 95117 IMMUNOTHERAPY INJECTIONS: CPT | Performed by: OTOLARYNGOLOGY

## 2021-09-20 NOTE — PROGRESS NOTES
Correct serum vials verified by patient and nurse. After obtaining consent, and per orders of Dr Amna Mcnair, injections of allergy serum given by MADHAV Faria RN. Decreased  injection dose by one dose. It has been 3 weeks since patient has had an injection. Patient instructed to remain in clinic for 30 minutes after injection, and to report any adverse reactions to me immediately. No change in patient status post injection.

## 2021-09-21 ENCOUNTER — OFFICE VISIT (OUTPATIENT)
Dept: ENT CLINIC | Age: 26
End: 2021-09-21
Payer: COMMERCIAL

## 2021-09-21 VITALS — BODY MASS INDEX: 34.75 KG/M2 | WEIGHT: 177 LBS | HEIGHT: 60 IN

## 2021-09-21 DIAGNOSIS — J32.0 CHRONIC MAXILLARY SINUSITIS: ICD-10-CM

## 2021-09-21 DIAGNOSIS — J33.9 NASAL POLYPOSIS: ICD-10-CM

## 2021-09-21 DIAGNOSIS — J30.9 ALLERGIC RHINITIS, UNSPECIFIED SEASONALITY, UNSPECIFIED TRIGGER: Primary | ICD-10-CM

## 2021-09-21 PROCEDURE — 99213 OFFICE O/P EST LOW 20 MIN: CPT | Performed by: OTOLARYNGOLOGY

## 2021-09-21 PROCEDURE — G8427 DOCREV CUR MEDS BY ELIG CLIN: HCPCS | Performed by: OTOLARYNGOLOGY

## 2021-09-21 PROCEDURE — 1036F TOBACCO NON-USER: CPT | Performed by: OTOLARYNGOLOGY

## 2021-09-21 PROCEDURE — G8417 CALC BMI ABV UP PARAM F/U: HCPCS | Performed by: OTOLARYNGOLOGY

## 2021-09-21 ASSESSMENT — ENCOUNTER SYMPTOMS
COUGH: 0
SHORTNESS OF BREATH: 0
VOICE CHANGE: 0
TROUBLE SWALLOWING: 0
STRIDOR: 0
PHOTOPHOBIA: 0
RHINORRHEA: 0
EYE ITCHING: 0
CHOKING: 0
SINUS PRESSURE: 0
NAUSEA: 0
EYE REDNESS: 0
EYE PAIN: 0
SORE THROAT: 0
DIARRHEA: 0
COLOR CHANGE: 0
SINUS PAIN: 0
FACIAL SWELLING: 0

## 2021-09-23 ENCOUNTER — VIRTUAL VISIT (OUTPATIENT)
Dept: PRIMARY CARE CLINIC | Age: 26
End: 2021-09-23
Payer: COMMERCIAL

## 2021-09-23 ENCOUNTER — PATIENT MESSAGE (OUTPATIENT)
Dept: PRIMARY CARE CLINIC | Age: 26
End: 2021-09-23

## 2021-09-23 DIAGNOSIS — Z11.52 ENCOUNTER FOR SCREENING FOR COVID-19: Primary | ICD-10-CM

## 2021-09-23 DIAGNOSIS — J06.9 UPPER RESPIRATORY TRACT INFECTION, UNSPECIFIED TYPE: Primary | ICD-10-CM

## 2021-09-23 PROCEDURE — 1036F TOBACCO NON-USER: CPT | Performed by: FAMILY MEDICINE

## 2021-09-23 PROCEDURE — 99213 OFFICE O/P EST LOW 20 MIN: CPT | Performed by: FAMILY MEDICINE

## 2021-09-23 PROCEDURE — G8427 DOCREV CUR MEDS BY ELIG CLIN: HCPCS | Performed by: FAMILY MEDICINE

## 2021-09-23 PROCEDURE — G8417 CALC BMI ABV UP PARAM F/U: HCPCS | Performed by: FAMILY MEDICINE

## 2021-09-23 RX ORDER — ALBUTEROL SULFATE 90 UG/1
2 AEROSOL, METERED RESPIRATORY (INHALATION) EVERY 6 HOURS PRN
Qty: 54 G | Refills: 1 | Status: SHIPPED | OUTPATIENT
Start: 2021-09-23

## 2021-09-23 ASSESSMENT — PATIENT HEALTH QUESTIONNAIRE - PHQ9
1. LITTLE INTEREST OR PLEASURE IN DOING THINGS: SEVERAL DAYS
SUM OF ALL RESPONSES TO PHQ9 QUESTIONS 1 & 2: 2
DEPRESSION UNABLE TO ASSESS: YES
2. FEELING DOWN, DEPRESSED OR HOPELESS: SEVERAL DAYS

## 2021-09-23 NOTE — TELEPHONE ENCOUNTER
From: Tyrone Briones  To: Marry Hoffmann MD  Sent: 9/23/2021 2:35 PM EDT  Subject: Non-Urgent Zackary Mahamed Dr. Tammy Yoon! I called for the Covid testing and they said they need an order put in for it and then I can go in at any time. Can you put an order in for me? Thanks!

## 2021-09-23 NOTE — PROGRESS NOTES
2021    TELEHEALTH EVALUATION -- Audio/Visual (During WLDVI-60 public health emergency)    HPI:    Scott Sandoval (:  1995) has requested an audio/video evaluation for the following concern(s):  Upper Respiratory Infection: Patient complains of symptoms of a URI. Symptoms include congestion, cough and sore throat. Onset of symptoms was a few days ago, unchanged since that time. She also c/o congestion, no  fever, non productive cough and post nasal drip for the past 7 days . She is drinking plenty of fluids. Evaluation to date: none. Treatment to date: antihistamines, nasal steroid. Review of Systems   Constitutional: Positive for fatigue. Negative for fever and unexpected weight change. HENT: Positive for congestion and sore throat. Eyes: Negative. Respiratory: Positive for cough. Negative for shortness of breath, wheezing and stridor. Cardiovascular: Negative. Gastrointestinal: Negative. Prior to Visit Medications    Medication Sig Taking?  Authorizing Provider   topiramate (TOPAMAX) 100 MG tablet Take 1 tablet by mouth 2 times daily Yes Cabrera Morgan MD   EPINEPHrine (EPIPEN) 0.3 MG/0.3ML SOAJ injection Use as directed for allergic reaction Yes Shayna Davis DO   metFORMIN (GLUCOPHAGE) 500 MG tablet Take 500 mg by mouth 2 times daily (with meals)  Yes Historical Provider, MD   acyclovir (ZOVIRAX) 400 MG tablet Take 400 mg by mouth Yes Historical Provider, MD   SUMAtriptan (IMITREX) 100 MG tablet Take 1 tablet by mouth once as needed for Migraine  Cabrera Morgan MD   rizatriptan (MAXALT) 10 MG tablet Take 1 tablet by mouth once as needed for Migraine May repeat in 2 hours if needed  Cabrera Morgan MD   fluticasone (FLONASE) 50 MCG/ACT nasal spray 2 sprays by Nasal route daily  Shayna Davis DO   predniSONE (DELTASONE) 10 MG tablet 4 tablets daily for 7 days, 3 tablets daily for 1 day, 2 tablets daily for 1 day, 1 tablet daily for 1 day  Patient not taking: Reported on 9/23/2021  Duane Binning, DO   acetaminophen (APAP EXTRA STRENGTH) 500 MG tablet Take 1 tablet by mouth every 6 hours as needed for Pain Alternate every 3 hours with ibuprofen  Duane Binning, DO       Social History     Tobacco Use    Smoking status: Never Smoker    Smokeless tobacco: Never Used   Vaping Use    Vaping Use: Never used   Substance Use Topics    Alcohol use: Not Currently    Drug use: Not Currently     Comment: heroin-snort 5 years ago        Allergies   Allergen Reactions    Nickel Rash     Skin began to excoriate    Lexapro [Escitalopram Oxalate]      Jittery and could not sleep   ,   Past Medical History:   Diagnosis Date    Anxiety     Atypical migraine     Bipolar 2 disorder (HCC)     Borderline [facility] disorder ([facility] [address]     Depression     Gestational diabetes     H/O drug abuse ([facility] [address]     hx of heroin/cocaine    IBS (irritable bowel syndrome)     Obese     PCOS (polycystic ovarian syndrome)    ,   Past Surgical History:   Procedure Laterality Date    COLONOSCOPY      ENDOSCOPY, COLON, DIAGNOSTIC      SEPTOPLASTY N/A 7/26/2021    SPHENOIDOTOMY RIGHT, BILATERAL ANTERIOR ETHMOIDECTOMY, BILATERAL INFERIOR TURBINATE REDUCTION, BILATERAL MAXILLARY ANTROSTOMY AND SEPTOPLASTY, NASAL SCOPE performed by Duane Binning, DO at [address] [place] [address]     ,   Social History     Tobacco Use    Smoking status: Never Smoker    Smokeless tobacco: Never Used   Vaping Use    Vaping Use: Never used   Substance Use Topics    Alcohol use: Not Currently    Drug use: Not Currently     Comment: heroin-snort 5 years ago   ,   Family History   Problem Relation Age of Onset    Other Maternal Grandmother         demetia    Heart Disease Maternal Grandmother     High Cholesterol Maternal Grandfather     Diabetes Maternal Grandfather     High Blood Pressure Maternal Grandfather     Heart Disease Maternal Grandfather    ,   Immunization History   Administered Date(s) Administered  BCG (Tin BCG) 08/21/1998, 03/12/2012    COVID-19, Pfizer, PF, 30mcg/0.3mL 09/15/2021    DTP 1993, 1993, 1993, 08/08/1994, 08/21/1998    HPV Quadrivalent (Gardasil) 05/18/2009, 07/23/2009, 11/25/2009    Hepatitis B 08/21/1998, 10/24/1998, 12/12/1998    Hib, unspecified 08/21/1998    Influenza Nasal 09/29/2010    Influenza Vaccine, unspecified formulation 01/11/2013, 10/22/2013, 10/21/2015, 10/01/2016, 09/29/2017    Influenza Virus Vaccine 09/02/2021    Influenza, Live, Intranasal, Quadv, (Flumist 2-49 yrs) 09/29/2010    Influenza, MDCK Quadv, IM, PF (Flucelvax 2 yrs and older) 09/17/2020    Influenza, Quadv, IM, PF (6 mo and older Fluzone, Flulaval, Fluarix, and 3 yrs and older Afluria) 10/21/2015, 11/09/2018    MMR 05/20/1994, 02/16/2004    Meningococcal MPSV4 (Menomune) 06/20/2005, 08/17/2011    PPD Test 03/12/2012    Polio OPV 1993, 1993, 1993, 08/08/1994    Td, unspecified formulation 06/20/2005    Tdap (Boostrix, Adacel) 05/18/2009, 12/29/2017    Varicella (Varivax) 09/26/1998, 12/16/2013   ,   Health Maintenance   Topic Date Due    Hepatitis B vaccine (4 of 4 - 4-dose series) 12/19/1998    Pap smear  09/11/2020    Hepatitis C screen  08/24/2022 (Originally 1995)    COVID-19 Vaccine (2 - Pfizer 2-dose series) 10/06/2021    DTaP/Tdap/Td vaccine (8 - Td or Tdap) 12/29/2027    Varicella vaccine  Completed    Flu vaccine  Completed    HIV screen  Completed    Hepatitis A vaccine  Aged Out    Hib vaccine  Aged Out    Meningococcal (ACWY) vaccine  Aged Out    Pneumococcal 0-64 years Vaccine  Aged Out       PHYSICAL EXAMINATION:  [ INSTRUCTIONS:  \"[x]\" Indicates a positive item  \"[]\" Indicates a negative item  -- DELETE ALL ITEMS NOT EXAMINED]  Vital Signs: (As obtained by patient/caregiver or practitioner observation)    Blood pressure-  Heart rate-    Respiratory rate-    Temperature-  Pulse oximetry-     Constitutional: [x] Appears present when appropriate. The patient was located in a state where the provider was credentialed to provide care. Total time spent on this encounter: Not billed by time    --Alistair Caal MD on 9/23/2021 at 2:19 PM    An electronic signature was used to authenticate this note.

## 2021-09-24 ASSESSMENT — ENCOUNTER SYMPTOMS
STRIDOR: 0
SHORTNESS OF BREATH: 0
GASTROINTESTINAL NEGATIVE: 1
SORE THROAT: 1
WHEEZING: 0
COUGH: 1
EYES NEGATIVE: 1

## 2021-09-25 ENCOUNTER — PATIENT MESSAGE (OUTPATIENT)
Dept: PRIMARY CARE CLINIC | Age: 26
End: 2021-09-25

## 2021-09-27 ENCOUNTER — NURSE ONLY (OUTPATIENT)
Dept: ENT CLINIC | Age: 26
End: 2021-09-27
Payer: COMMERCIAL

## 2021-09-27 DIAGNOSIS — J30.9 ALLERGIC RHINITIS, UNSPECIFIED SEASONALITY, UNSPECIFIED TRIGGER: ICD-10-CM

## 2021-09-27 PROCEDURE — 95117 IMMUNOTHERAPY INJECTIONS: CPT | Performed by: OTOLARYNGOLOGY

## 2021-09-27 NOTE — TELEPHONE ENCOUNTER
From: Bibi Belcher  To: Radha Han MD  Sent: 9/25/2021 9:50 AM EDT  Subject: Test Results Question    Why does it say my COVID test order was canceled? Did they still process it and it was negative?

## 2021-10-04 ENCOUNTER — NURSE ONLY (OUTPATIENT)
Dept: ENT CLINIC | Age: 26
End: 2021-10-04
Payer: COMMERCIAL

## 2021-10-04 DIAGNOSIS — J30.9 ALLERGIC RHINITIS, UNSPECIFIED SEASONALITY, UNSPECIFIED TRIGGER: ICD-10-CM

## 2021-10-04 PROCEDURE — 95117 IMMUNOTHERAPY INJECTIONS: CPT | Performed by: OTOLARYNGOLOGY

## 2021-10-04 NOTE — PROGRESS NOTES
Correct serum vials verified by patient and nurse. After obtaining consent, and per orders of Dr David Avalos, injections of allergy serum given by MADHAV Faria RN. Patient instructed to remain in clinic for 30 minutes after injection, and to report any adverse reactions to me immediately. No change in patient status post injection.

## 2021-10-11 ENCOUNTER — NURSE ONLY (OUTPATIENT)
Dept: ENT CLINIC | Age: 26
End: 2021-10-11
Payer: COMMERCIAL

## 2021-10-11 DIAGNOSIS — J30.9 ALLERGIC RHINITIS, UNSPECIFIED SEASONALITY, UNSPECIFIED TRIGGER: ICD-10-CM

## 2021-10-11 PROCEDURE — 95117 IMMUNOTHERAPY INJECTIONS: CPT | Performed by: OTOLARYNGOLOGY

## 2021-10-11 NOTE — PROGRESS NOTES
Correct serum vials verified by patient and nurse. After obtaining consent, and per orders of Dr Josue Patterson, injections of allergy serum given by MADHAV Faria RN. Patient instructed to remain in clinic for 30 minutes after injection, and to report any adverse reactions to me immediately. No change in patient status post injection.

## 2021-10-13 NOTE — TELEPHONE ENCOUNTER
Medication:   Requested Prescriptions     Pending Prescriptions Disp Refills    topiramate (TOPAMAX) 100 MG tablet [Pharmacy Med Name: TOPIRAMATE 100 MG TABLET] 60 tablet 2     Sig: TAKE 1 TABLET BY MOUTH TWICE A DAY     Last Filled:  8/24/21    Last appt: 9/23/2021   Next appt: 11/30/2021    Last Labs DM: No results found for: LABA1C  Last Lipid:   Lab Results   Component Value Date    CHOL 164 11/16/2018    TRIG 63 11/16/2018    HDL 52 11/16/2018    LDLCALC 99 11/16/2018   Last Thyroid:   Lab Results   Component Value Date    TSH 2.59 02/23/2021

## 2021-10-14 RX ORDER — TOPIRAMATE 100 MG/1
TABLET, FILM COATED ORAL
Qty: 60 TABLET | Refills: 2 | Status: SHIPPED | OUTPATIENT
Start: 2021-10-14 | End: 2021-11-30

## 2021-10-18 ENCOUNTER — NURSE ONLY (OUTPATIENT)
Dept: ENT CLINIC | Age: 26
End: 2021-10-18
Payer: COMMERCIAL

## 2021-10-18 DIAGNOSIS — J30.9 ALLERGIC RHINITIS, UNSPECIFIED SEASONALITY, UNSPECIFIED TRIGGER: ICD-10-CM

## 2021-10-18 PROCEDURE — 95117 IMMUNOTHERAPY INJECTIONS: CPT | Performed by: OTOLARYNGOLOGY

## 2021-10-25 ENCOUNTER — NURSE ONLY (OUTPATIENT)
Dept: ENT CLINIC | Age: 26
End: 2021-10-25
Payer: COMMERCIAL

## 2021-10-25 DIAGNOSIS — J30.9 ALLERGIC RHINITIS, UNSPECIFIED SEASONALITY, UNSPECIFIED TRIGGER: ICD-10-CM

## 2021-10-25 PROCEDURE — 95117 IMMUNOTHERAPY INJECTIONS: CPT | Performed by: OTOLARYNGOLOGY

## 2021-10-25 NOTE — PROGRESS NOTES
Correct serum vials verified by patient and nurse. After obtaining consent, and per orders of Dr Jaspreet Palacios, injections of allergy serum given by Wellington Lott RN. Patient instructed to remain in clinic for 30 minutes after injection, and to report any adverse reactions to me immediately. No change in patient status post injection.

## 2021-10-27 ENCOUNTER — TELEPHONE (OUTPATIENT)
Dept: ENT CLINIC | Age: 26
End: 2021-10-27

## 2021-10-27 ENCOUNTER — NURSE ONLY (OUTPATIENT)
Dept: ENT CLINIC | Age: 26
End: 2021-10-27
Payer: COMMERCIAL

## 2021-10-27 DIAGNOSIS — J30.9 ALLERGIC RHINITIS, UNSPECIFIED SEASONALITY, UNSPECIFIED TRIGGER: ICD-10-CM

## 2021-10-27 DIAGNOSIS — G43.819 OTHER MIGRAINE WITHOUT STATUS MIGRAINOSUS, INTRACTABLE: ICD-10-CM

## 2021-10-27 PROCEDURE — 95165 ANTIGEN THERAPY SERVICES: CPT | Performed by: OTOLARYNGOLOGY

## 2021-10-27 NOTE — TELEPHONE ENCOUNTER
Medication:   Requested Prescriptions     Pending Prescriptions Disp Refills    SUMAtriptan (IMITREX) 100 MG tablet [Pharmacy Med Name: SUMATRIPTAN SUCC 100 MG TABLET] 12 tablet 1     Sig: TAKE 1 TABLET BY MOUTH ONCE AS NEEDED FOR MIGRAINE     Last Filled:  08/30/21    Last appt: 9/23/2021   Next appt: 11/30/2021    Last OARRS: No flowsheet data found.

## 2021-10-27 NOTE — TELEPHONE ENCOUNTER
Patient has been tolerating allergy immunotherapy injections without incident. Recommend advancing Prescription Treatment Set to the next stronger concentration. Recommend continuing on the Escalated Build Up. Please advise.

## 2021-10-28 RX ORDER — SUMATRIPTAN 100 MG/1
100 TABLET, FILM COATED ORAL
Qty: 12 TABLET | Refills: 1 | Status: SHIPPED | OUTPATIENT
Start: 2021-10-28 | End: 2021-12-20

## 2021-11-01 ENCOUNTER — NURSE ONLY (OUTPATIENT)
Dept: ENT CLINIC | Age: 26
End: 2021-11-01
Payer: COMMERCIAL

## 2021-11-01 DIAGNOSIS — J30.9 ALLERGIC RHINITIS, UNSPECIFIED SEASONALITY, UNSPECIFIED TRIGGER: ICD-10-CM

## 2021-11-01 PROCEDURE — 95117 IMMUNOTHERAPY INJECTIONS: CPT | Performed by: OTOLARYNGOLOGY

## 2021-11-01 NOTE — PROGRESS NOTES
Correct serum vials verified by patient and nurse. Consent obtained and SVT completed. SVT P=7mm wheal, M=6mm wheal.  After obtaining consent, and per orders of Dr Jaspreet Palacios, injections of allergy serum given by MADHAV Faria RN. Patient instructed to remain in clinic for 30 minutes after injection, and to report any adverse reactions to me immediately. No change in patient status post injection.

## 2021-11-08 ENCOUNTER — NURSE ONLY (OUTPATIENT)
Dept: ENT CLINIC | Age: 26
End: 2021-11-08
Payer: COMMERCIAL

## 2021-11-08 DIAGNOSIS — J30.9 ALLERGIC RHINITIS, UNSPECIFIED SEASONALITY, UNSPECIFIED TRIGGER: ICD-10-CM

## 2021-11-08 PROCEDURE — 95117 IMMUNOTHERAPY INJECTIONS: CPT | Performed by: OTOLARYNGOLOGY

## 2021-11-08 NOTE — PROGRESS NOTES
Correct serum vials verified by patient and nurse. After obtaining consent, and per orders of Dr Devin Bailey, injections of allergy serum given by VENUS Yeh [facility]. Patient instructed to remain in clinic for 30 minutes after injection, and to report any adverse reactions to me immediately. No change in patient status post injection.

## 2021-11-15 ENCOUNTER — NURSE ONLY (OUTPATIENT)
Dept: ENT CLINIC | Age: 26
End: 2021-11-15
Payer: COMMERCIAL

## 2021-11-15 DIAGNOSIS — J30.9 ALLERGIC RHINITIS, UNSPECIFIED SEASONALITY, UNSPECIFIED TRIGGER: ICD-10-CM

## 2021-11-15 PROCEDURE — 95117 IMMUNOTHERAPY INJECTIONS: CPT | Performed by: OTOLARYNGOLOGY

## 2021-11-15 NOTE — PROGRESS NOTES
Correct serum vials verified by patient and nurse. After obtaining consent, and per orders of Dr Eldon Foote, injections of allergy serum given by MADHAV Faria RN. Patient instructed to remain in clinic for 30 minutes after injection, and to report any adverse reactions to me immediately. No change in patient status post injection.

## 2021-11-16 ENCOUNTER — OFFICE VISIT (OUTPATIENT)
Dept: ENT CLINIC | Age: 26
End: 2021-11-16
Payer: COMMERCIAL

## 2021-11-16 VITALS
HEIGHT: 60 IN | BODY MASS INDEX: 34.75 KG/M2 | HEART RATE: 91 BPM | WEIGHT: 177 LBS | SYSTOLIC BLOOD PRESSURE: 110 MMHG | DIASTOLIC BLOOD PRESSURE: 60 MMHG

## 2021-11-16 DIAGNOSIS — J30.1 SEASONAL ALLERGIC RHINITIS DUE TO POLLEN: ICD-10-CM

## 2021-11-16 DIAGNOSIS — J32.3 CHRONIC SPHENOIDAL SINUSITIS: ICD-10-CM

## 2021-11-16 DIAGNOSIS — J33.9 NASAL POLYPOSIS: Primary | ICD-10-CM

## 2021-11-16 DIAGNOSIS — J32.0 CHRONIC MAXILLARY SINUSITIS: ICD-10-CM

## 2021-11-16 PROCEDURE — G8427 DOCREV CUR MEDS BY ELIG CLIN: HCPCS | Performed by: OTOLARYNGOLOGY

## 2021-11-16 PROCEDURE — 1036F TOBACCO NON-USER: CPT | Performed by: OTOLARYNGOLOGY

## 2021-11-16 PROCEDURE — G8484 FLU IMMUNIZE NO ADMIN: HCPCS | Performed by: OTOLARYNGOLOGY

## 2021-11-16 PROCEDURE — 99213 OFFICE O/P EST LOW 20 MIN: CPT | Performed by: OTOLARYNGOLOGY

## 2021-11-16 PROCEDURE — 31231 NASAL ENDOSCOPY DX: CPT | Performed by: OTOLARYNGOLOGY

## 2021-11-16 PROCEDURE — G8417 CALC BMI ABV UP PARAM F/U: HCPCS | Performed by: OTOLARYNGOLOGY

## 2021-11-16 ASSESSMENT — ENCOUNTER SYMPTOMS
NAUSEA: 0
SINUS PAIN: 0
SINUS PRESSURE: 0
PHOTOPHOBIA: 0
VOICE CHANGE: 0
RHINORRHEA: 0
TROUBLE SWALLOWING: 0
SHORTNESS OF BREATH: 0
COUGH: 0
DIARRHEA: 0
STRIDOR: 0
EYE REDNESS: 0
SORE THROAT: 0
EYE PAIN: 0
EYE ITCHING: 0
FACIAL SWELLING: 0
CHOKING: 0
COLOR CHANGE: 0

## 2021-11-16 NOTE — PROGRESS NOTES
[facility]  [address] [address] [facility], [address], [address]  P: [phone]  F: [phone]       Patient     Corinthia Fabry  1995    ChiefComplaint     Chief Complaint   Patient presents with    Follow-up     Patient is here today for her 2 month follow up, she is doing well, she has pressure every now and then and the migraines age becoming less       History of Present Illness     Corinthia Fabry is a pleasant 27 y.o. female here for 3-month follow-up for endoscopic sinus surgery, septoplasty and turbinate reduction. She is overall doing very well. Symptoms of obstruction and pressure significantly improved. Continues to do allergy immunotherapy with significant improvement of symptoms as well. Migraines are decreasing in number.     Past Medical History     Past Medical History:   Diagnosis Date    Anxiety     Atypical migraine     Bipolar 2 disorder (HCC)     Borderline personality disorder ([facility] [address]     Depression     Gestational diabetes     H/O drug abuse (HCC)     hx of heroin/cocaine    IBS (irritable bowel syndrome)     Obese     PCOS (polycystic ovarian syndrome)        Past Surgical History     Past Surgical History:   Procedure Laterality Date    COLONOSCOPY      ENDOSCOPY, COLON, DIAGNOSTIC      SEPTOPLASTY N/A 7/26/2021    SPHENOIDOTOMY RIGHT, BILATERAL ANTERIOR ETHMOIDECTOMY, BILATERAL INFERIOR TURBINATE REDUCTION, BILATERAL MAXILLARY ANTROSTOMY AND SEPTOPLASTY, NASAL SCOPE performed by Leonor Moncada DO at [address] EXTRACTION         Family History     Family History   Problem Relation Age of Onset    Other Maternal Grandmother         demetia    Heart Disease Maternal Grandmother     High Cholesterol Maternal Grandfather     Diabetes Maternal Grandfather     High Blood Pressure Maternal Grandfather     Heart Disease Maternal Grandfather        Social History     Social History     Socioeconomic History    Marital status: Life Partner     Spouse name: Not on file    Number of children: Not on file    Years of education: Not on file    Highest education level: Not on file   Occupational History     Comment: Refused to answer   Tobacco Use    Smoking status: Never Smoker    Smokeless tobacco: Never Used   Vaping Use    Vaping Use: Never used   Substance and Sexual Activity    Alcohol use: Not Currently    Drug use: Not Currently     Comment: heroin-snort 5 years ago    Sexual activity: Yes     Partners: Male   Other Topics Concern    Not on file   Social History Narrative    Not on file     Social Determinants of Health     Financial Resource Strain: Low Risk     Difficulty of Paying Living Expenses: Not hard at all   Food Insecurity: No Food Insecurity    Worried About [address] [employer] in the Last Year: Never true    Mike of Food in the Last Year: Never true   Transportation Needs: No Transportation Needs    Lack of Transportation (Medical): No    Lack of Transportation (Non-Medical): No   Physical Activity: Sufficiently Active    Days of Exercise per Week: 6 days    Minutes of Exercise per Session: 40 min   Stress: No Stress Concern Present    Feeling of Stress : Not at all   Social Connections:  Moderately Isolated    Frequency of Communication with Friends and Family: Once a week    Frequency of Social Gatherings with Friends and Family: Twice a week    Attends Alevism Services: Never    Active Member of Clubs or Organizations: No    Attends Club or Organization Meetings: Never    Marital Status: Living with partner   Intimate Partner Violence:     Fear of Current or Ex-Partner: Not on file    Emotionally Abused: Not on file    Physically Abused: Not on file    Sexually Abused: Not on file   Housing Stability: [address] Unable to Pay for Housing in the Last Year: No    Number of [place] in the Last Year: 1    Unstable Housing in the Last Year: No       Allergies     Allergies   Allergen Reactions    Nickel Rash     Skin began to excoriate    Lexapro [Escitalopram Oxalate]      Jittery and could not sleep       Medications     Current Outpatient Medications   Medication Sig Dispense Refill    topiramate (TOPAMAX) 100 MG tablet TAKE 1 TABLET BY MOUTH TWICE A DAY 60 tablet 2    albuterol sulfate HFA (VENTOLIN HFA) 108 (90 Base) MCG/ACT inhaler Inhale 2 puffs into the lungs every 6 hours as needed for Wheezing or Shortness of Breath (cough) 54 g 1    EPINEPHrine (EPIPEN) 0.3 MG/0.3ML SOAJ injection Use as directed for allergic reaction 1 each 1    metFORMIN (GLUCOPHAGE) 500 MG tablet Take 500 mg by mouth 2 times daily (with meals)       acyclovir (ZOVIRAX) 400 MG tablet Take 400 mg by mouth      SUMAtriptan (IMITREX) 100 MG tablet TAKE 1 TABLET BY MOUTH ONCE AS NEEDED FOR MIGRAINE 12 tablet 1    rizatriptan (MAXALT) 10 MG tablet Take 1 tablet by mouth once as needed for Migraine May repeat in 2 hours if needed 12 tablet 3    fluticasone (FLONASE) 50 MCG/ACT nasal spray 2 sprays by Nasal route daily 2 Bottle 5    acetaminophen (APAP EXTRA STRENGTH) 500 MG tablet Take 1 tablet by mouth every 6 hours as needed for Pain Alternate every 3 hours with ibuprofen 60 tablet 0     No current facility-administered medications for this visit. Review of Systems     Review of Systems   Constitutional: Negative for chills, fatigue and fever. HENT: Negative for congestion, ear discharge, ear pain, facial swelling, hearing loss, nosebleeds, postnasal drip, rhinorrhea, sinus pressure, sinus pain, sneezing, sore throat, tinnitus, trouble swallowing and voice change. Eyes: Negative for photophobia, pain, redness, itching and visual disturbance. Respiratory: Negative for cough, choking, shortness of breath and stridor. Gastrointestinal: Negative for diarrhea and nausea. Musculoskeletal: Negative for neck pain and neck stiffness. Skin: Negative for color change and rash.    Neurological: Negative for dizziness, facial asymmetry and light-headedness. Hematological: Negative for adenopathy. Psychiatric/Behavioral: Negative for agitation and confusion. PhysicalExam     Vitals:    11/16/21 1326   BP: 110/60   Pulse: 91       Physical Exam  Constitutional:       Appearance: She is well-developed. HENT:      Head: Normocephalic and atraumatic. Jaw: No trismus. Right Ear: Tympanic membrane, ear canal and external ear normal. No drainage. No middle ear effusion. Tympanic membrane is not perforated. Left Ear: Tympanic membrane, ear canal and external ear normal. No drainage. No middle ear effusion. Tympanic membrane is not perforated. Nose: No septal deviation, mucosal edema or rhinorrhea. Mouth/Throat:      Dentition: Normal dentition. Pharynx: Uvula midline. No oropharyngeal exudate. Eyes:      General: No scleral icterus. Right eye: No discharge. Left eye: No discharge. Pupils: Pupils are equal, round, and reactive to light. Neck:      Thyroid: No thyromegaly. Trachea: Phonation normal. No tracheal deviation. Pulmonary:      Effort: Pulmonary effort is normal. No respiratory distress. Breath sounds: No stridor. Musculoskeletal:      Cervical back: Neck supple. Lymphadenopathy:      Cervical: No cervical adenopathy. Skin:     General: Skin is warm and dry. Neurological:      Mental Status: She is alert and oriented to person, place, and time. Cranial Nerves: No cranial nerve deficit. Psychiatric:         Behavior: Behavior normal.           Procedure     Rigid Nasal Endoscopy    Preop: Chronic sinusitis, nasal polyposis  Anes: topical lidocaine with afrin  Consent: verbal  Description:  After obtaining verbal consent from the patient 4% lidocaine with afrin was sprayed into the nasal cavities. After allowing a time for anesthesia, a nasal endoscope was placed into the naris.   The septum, inferior, and middle turbinates were examined. The middle meatus, and sphenoethmoid recess was examined bilaterally. Interval regression of nasal polyp of right anterior ethmoid cavity. Well-healed mucosa throughout. No mucopurulent drainage. Tolerated well without complication. Assessment and Plan     1. Nasal polyposis  Interval regression of nasal polyp now with twice daily fluticasone. Sinus cavities well-healed bilaterally without any evidence of inflammation. Patient is overall very satisfied with symptom improvement. Continues to escalate allergy immunotherapy. She is experiencing relief in symptoms with that as well. Follow-up 1 year. 2. Chronic maxillary sinusitis      3. Chronic sphenoidal sinusitis      4. Seasonal allergic rhinitis due to pollen        Return in about 1 year (around 11/16/2022). Portions of this note were dictated using Dragon.  There may be linguistic errors secondary to the use of this program.

## 2021-11-22 ENCOUNTER — NURSE ONLY (OUTPATIENT)
Dept: ENT CLINIC | Age: 26
End: 2021-11-22
Payer: COMMERCIAL

## 2021-11-22 DIAGNOSIS — J30.9 ALLERGIC RHINITIS, UNSPECIFIED SEASONALITY, UNSPECIFIED TRIGGER: ICD-10-CM

## 2021-11-22 PROCEDURE — 95117 IMMUNOTHERAPY INJECTIONS: CPT | Performed by: OTOLARYNGOLOGY

## 2021-11-22 NOTE — PROGRESS NOTES
Correct serum vials verified by patient and nurse. After obtaining consent, and per orders of Dr Ronaldo Krishnamurthy, injections of allergy serum given by MADHAV Faria RN. Patient instructed to remain in clinic for 30 minutes after injection, and to report any adverse reactions to me immediately. No change in patient status post injection.

## 2021-11-30 ENCOUNTER — OFFICE VISIT (OUTPATIENT)
Dept: PRIMARY CARE CLINIC | Age: 26
End: 2021-11-30
Payer: COMMERCIAL

## 2021-11-30 ENCOUNTER — TELEPHONE (OUTPATIENT)
Dept: ENT CLINIC | Age: 26
End: 2021-11-30

## 2021-11-30 VITALS
OXYGEN SATURATION: 99 % | WEIGHT: 172 LBS | HEIGHT: 60 IN | HEART RATE: 91 BPM | RESPIRATION RATE: 12 BRPM | BODY MASS INDEX: 33.77 KG/M2 | TEMPERATURE: 97.3 F | SYSTOLIC BLOOD PRESSURE: 118 MMHG | DIASTOLIC BLOOD PRESSURE: 80 MMHG

## 2021-11-30 DIAGNOSIS — G43.819 OTHER MIGRAINE WITHOUT STATUS MIGRAINOSUS, INTRACTABLE: Primary | ICD-10-CM

## 2021-11-30 DIAGNOSIS — F90.9 ATTENTION DEFICIT HYPERACTIVITY DISORDER (ADHD), UNSPECIFIED ADHD TYPE: ICD-10-CM

## 2021-11-30 DIAGNOSIS — F41.8 ANXIETY WITH DEPRESSION: ICD-10-CM

## 2021-11-30 PROCEDURE — 99214 OFFICE O/P EST MOD 30 MIN: CPT | Performed by: FAMILY MEDICINE

## 2021-11-30 PROCEDURE — G8484 FLU IMMUNIZE NO ADMIN: HCPCS | Performed by: FAMILY MEDICINE

## 2021-11-30 PROCEDURE — G8427 DOCREV CUR MEDS BY ELIG CLIN: HCPCS | Performed by: FAMILY MEDICINE

## 2021-11-30 PROCEDURE — G8417 CALC BMI ABV UP PARAM F/U: HCPCS | Performed by: FAMILY MEDICINE

## 2021-11-30 PROCEDURE — 1036F TOBACCO NON-USER: CPT | Performed by: FAMILY MEDICINE

## 2021-11-30 RX ORDER — CETIRIZINE HYDROCHLORIDE 10 MG/1
10 TABLET ORAL DAILY
COMMUNITY

## 2021-11-30 RX ORDER — AMITRIPTYLINE HYDROCHLORIDE 10 MG/1
10 TABLET, FILM COATED ORAL NIGHTLY
Qty: 30 TABLET | Refills: 2 | Status: SHIPPED | OUTPATIENT
Start: 2021-11-30 | End: 2021-12-22

## 2021-11-30 ASSESSMENT — PATIENT HEALTH QUESTIONNAIRE - PHQ9
SUM OF ALL RESPONSES TO PHQ QUESTIONS 1-9: 0
SUM OF ALL RESPONSES TO PHQ QUESTIONS 1-9: 0
1. LITTLE INTEREST OR PLEASURE IN DOING THINGS: 0
SUM OF ALL RESPONSES TO PHQ9 QUESTIONS 1 & 2: 0
SUM OF ALL RESPONSES TO PHQ QUESTIONS 1-9: 0
2. FEELING DOWN, DEPRESSED OR HOPELESS: 0

## 2021-11-30 ASSESSMENT — ENCOUNTER SYMPTOMS
GASTROINTESTINAL NEGATIVE: 1
EYES NEGATIVE: 1
RESPIRATORY NEGATIVE: 1

## 2021-11-30 NOTE — PROGRESS NOTES
SPHENOIDOTOMY RIGHT, BILATERAL ANTERIOR ETHMOIDECTOMY, BILATERAL INFERIOR TURBINATE REDUCTION, BILATERAL MAXILLARY ANTROSTOMY AND SEPTOPLASTY, NASAL SCOPE performed by Alistair Leblanc DO at [address] EXTRACTION       Family History   Problem Relation Age of Onset    Other Maternal Grandmother         demetia    Heart Disease Maternal Grandmother     High Cholesterol Maternal Grandfather     Diabetes Maternal Grandfather     High Blood Pressure Maternal Grandfather     Heart Disease Maternal Grandfather      Social History     Socioeconomic History    Marital status: Life Partner     Spouse name: None    Number of children: None    Years of education: None    Highest education level: None   Occupational History     Comment: Refused to answer   Tobacco Use    Smoking status: Never Smoker    Smokeless tobacco: Never Used   Vaping Use    Vaping Use: Never used   Substance and Sexual Activity    Alcohol use: Not Currently    Drug use: Not Currently     Comment: heroin-snort 5 years ago    Sexual activity: Yes     Partners: Male   Other Topics Concern    None   Social History Narrative    None     Social Determinants of Health     Financial Resource Strain: Low Risk     Difficulty of Paying Living Expenses: Not hard at all   Food Insecurity: No Food Insecurity    Worried About Running Out of Food in the Last Year: Never true    Mike of Food in the Last Year: Never true   Transportation Needs: No Transportation Needs    Lack of Transportation (Medical): No    Lack of Transportation (Non-Medical): No   Physical Activity: Sufficiently Active    Days of Exercise per Week: 6 days    Minutes of Exercise per Session: 40 min   Stress: No Stress Concern Present    Feeling of Stress : Not at all   Social Connections:  Moderately Isolated    Frequency of Communication with Friends and Family: Once a week    Frequency of Social Gatherings with Friends and Family: Twice a week    Attends Mormonism Services: Never    Active Member of Clubs or Organizations: No    Attends Club or Organization Meetings: Never    Marital Status: Living with partner   Intimate Partner Violence:     Fear of Current or Ex-Partner: Not on file    Emotionally Abused: Not on file    Physically Abused: Not on file    Sexually Abused: Not on file   Housing Stability: [address] Unable to Pay for Housing in the Last Year: No    Number of [place] in the Last Year: 1    Unstable Housing in the Last Year: No     Current Outpatient Medications   Medication Sig Dispense Refill    cetirizine (ZYRTEC) 10 MG tablet Take 10 mg by mouth daily      SUMAtriptan (IMITREX) 100 MG tablet TAKE 1 TABLET BY MOUTH ONCE AS NEEDED FOR MIGRAINE 12 tablet 1    topiramate (TOPAMAX) 100 MG tablet TAKE 1 TABLET BY MOUTH TWICE A DAY 60 tablet 2    albuterol sulfate HFA (VENTOLIN HFA) 108 (90 Base) MCG/ACT inhaler Inhale 2 puffs into the lungs every 6 hours as needed for Wheezing or Shortness of Breath (cough) 54 g 1    fluticasone (FLONASE) 50 MCG/ACT nasal spray 2 sprays by Nasal route daily 2 Bottle 5    EPINEPHrine (EPIPEN) 0.3 MG/0.3ML SOAJ injection Use as directed for allergic reaction 1 each 1    metFORMIN (GLUCOPHAGE) 500 MG tablet Take 500 mg by mouth 2 times daily (with meals)       acyclovir (ZOVIRAX) 400 MG tablet Take 400 mg by mouth      acetaminophen (APAP EXTRA STRENGTH) 500 MG tablet Take 1 tablet by mouth every 6 hours as needed for Pain Alternate every 3 hours with ibuprofen 60 tablet 0     No current facility-administered medications for this visit. Allergies   Allergen Reactions    Nickel Rash     Skin began to excoriate    Lexapro [Escitalopram Oxalate]      Jittery and could not sleep       Review of Systems   Constitutional: Negative. HENT: Negative. Eyes: Negative. Respiratory: Negative. Cardiovascular: Negative. Gastrointestinal: Negative.     Neurological: Positive for headaches. Negative for dizziness, tremors, seizures, syncope, facial asymmetry, speech difficulty, weakness, light-headedness and numbness. Psychiatric/Behavioral: Positive for agitation, decreased concentration and sleep disturbance. All other systems reviewed and are negative. OBJECTIVE:  /80 (Site: Left Upper Arm, Position: Sitting, Cuff Size: Large Adult)   Pulse 91   Temp 97.3 °F (36.3 °C) (Temporal)   Resp 12   Ht 5' (1.524 m)   Wt 172 lb (78 kg)   LMP 11/18/2021 (Exact Date)   SpO2 99%   Breastfeeding No   BMI 33.59 kg/m²     Physical Exam  Vitals reviewed. Constitutional:       Appearance: Normal appearance. HENT:      Head: Normocephalic and atraumatic. Nose: Nose normal.   Eyes:      General: No scleral icterus. Conjunctiva/sclera: Conjunctivae normal.   Neck:      Thyroid: No thyromegaly. Vascular: No JVD. Cardiovascular:      Rate and Rhythm: Normal rate and regular rhythm. Heart sounds: Normal heart sounds. No murmur heard. No friction rub. No gallop. Pulmonary:      Effort: Pulmonary effort is normal.      Breath sounds: Normal breath sounds. No wheezing or rales. Abdominal:      General: Bowel sounds are normal. There is no distension. Palpations: Abdomen is soft. There is no mass. Tenderness: There is no abdominal tenderness. Hernia: No hernia is present. Lymphadenopathy:      Cervical: No cervical adenopathy. Skin:     Findings: No rash. Neurological:      Mental Status: She is alert and oriented to person, place, and time. ASSESSMENT:   Diagnosis Orders   1. Other migraine without status migrainosus, intractable     2. Anxiety with depression     3.  Attention deficit hyperactivity disorder (ADHD), unspecified ADHD type       PLAN:  Prior level 10 mg at bedtime  Monitor headaches  Keep a diary  Off Topamax  Recommend seen in psychology and psychiatry for further assessment and evaluation  List of both were given for the patient to call make an appointment

## 2021-11-30 NOTE — TELEPHONE ENCOUNTER
Patient was prescribed medication for migranes - Amitriptyline hcl 10 mg. Will this interfere with allergy injections?

## 2021-12-01 ENCOUNTER — NURSE ONLY (OUTPATIENT)
Dept: ENT CLINIC | Age: 26
End: 2021-12-01
Payer: COMMERCIAL

## 2021-12-01 DIAGNOSIS — J30.9 ALLERGIC RHINITIS, UNSPECIFIED SEASONALITY, UNSPECIFIED TRIGGER: ICD-10-CM

## 2021-12-01 PROCEDURE — 95117 IMMUNOTHERAPY INJECTIONS: CPT | Performed by: OTOLARYNGOLOGY

## 2021-12-01 NOTE — PROGRESS NOTES
Correct serum vials verified by patient and nurse. After obtaining consent, and per orders of Dr Je Farnsworth, injections of allergy serum given by MADHAV Faria RN. Patient instructed to remain in clinic for 30 minutes after injection, and to report any adverse reactions to me immediately. No change in patient status post injection.

## 2021-12-06 ENCOUNTER — NURSE ONLY (OUTPATIENT)
Dept: ENT CLINIC | Age: 26
End: 2021-12-06
Payer: COMMERCIAL

## 2021-12-06 ENCOUNTER — TELEPHONE (OUTPATIENT)
Dept: ENT CLINIC | Age: 26
End: 2021-12-06

## 2021-12-06 DIAGNOSIS — J30.9 ALLERGIC RHINITIS, UNSPECIFIED SEASONALITY, UNSPECIFIED TRIGGER: ICD-10-CM

## 2021-12-06 PROCEDURE — 95117 IMMUNOTHERAPY INJECTIONS: CPT | Performed by: OTOLARYNGOLOGY

## 2021-12-06 NOTE — PROGRESS NOTES
Correct serum vials verified by patient and nurse. After obtaining consent, and per orders of Dr Lius Kilpatrick, injections of allergy serum given by MADHAV Faria RN. Patient instructed to remain in clinic for 30 minutes after injection, and to report any adverse reactions to me immediately. No change in patient status post injection.

## 2021-12-08 ENCOUNTER — NURSE ONLY (OUTPATIENT)
Dept: ENT CLINIC | Age: 26
End: 2021-12-08
Payer: COMMERCIAL

## 2021-12-08 DIAGNOSIS — J30.9 ALLERGIC RHINITIS, UNSPECIFIED SEASONALITY, UNSPECIFIED TRIGGER: ICD-10-CM

## 2021-12-08 PROCEDURE — 95165 ANTIGEN THERAPY SERVICES: CPT | Performed by: OTOLARYNGOLOGY

## 2021-12-14 ENCOUNTER — PATIENT MESSAGE (OUTPATIENT)
Dept: PRIMARY CARE CLINIC | Age: 26
End: 2021-12-14

## 2021-12-14 ENCOUNTER — TELEPHONE (OUTPATIENT)
Dept: ENT CLINIC | Age: 26
End: 2021-12-14

## 2021-12-14 DIAGNOSIS — G43.819 OTHER MIGRAINE WITHOUT STATUS MIGRAINOSUS, INTRACTABLE: Primary | ICD-10-CM

## 2021-12-14 NOTE — TELEPHONE ENCOUNTER
Patient called to apologize for missing her appointment yesterday. She had a bad migraine, took medication and slept through her appt time. Does she need to come in tomorrow? Please call patient to advise.

## 2021-12-14 NOTE — TELEPHONE ENCOUNTER
From: Kandace Dupont  To: Dr. Iza Guevara: 12/14/2021 3:55 PM EST  Subject: amitriptyline    Hi Dr. Baker Right  Sorry to bother you. I know I'm supposed to stay on this medicine for 4 weeks but I have only had 1 day without migraines since taking it. In addition, I had the worst migraine I have ever had yesterday where I needed to ask my mother in law to watch my kids. Typically I'm able to tough it out until my fiance gets home, especially since most the time the migraines consistently start around 3pm but yesterday was outside the norm. The Sumatriptan still works but takes a little longer to work and [facility] obviously running out. Is there anyway I can go back to the Topamax until we meet on the 28th and figure out a new plan? I still have some. I just cannot function like this and it's disappointing because I start the day more motivated and well rested than I have been in years on this new medication but come 3pm, the migraines, just take me down. Maybe is it possible to combined Topamax and amitriptyline? Then maybe that will fight off the morning migraines I was getting with the Topamax and the afternoon ones with the amitriptyline. ..but I don't know if that's medically safe. I am so sorry for being so difficult and thank you so much for your help!     Kandace Dupont

## 2021-12-15 ENCOUNTER — NURSE ONLY (OUTPATIENT)
Dept: ENT CLINIC | Age: 26
End: 2021-12-15
Payer: COMMERCIAL

## 2021-12-15 DIAGNOSIS — J30.9 ALLERGIC RHINITIS, UNSPECIFIED SEASONALITY, UNSPECIFIED TRIGGER: ICD-10-CM

## 2021-12-15 PROCEDURE — 95117 IMMUNOTHERAPY INJECTIONS: CPT | Performed by: OTOLARYNGOLOGY

## 2021-12-15 NOTE — PROGRESS NOTES
Correct serum vials verified by patient and nurse. Consent obtained and SVT completed. SVT P=6mm wheal, M=7mm wheal.  After obtaining consent, and per orders of Dr Kellie Buitargo, injections of allergy serum given by MADHAV Faria RN. Patient instructed to remain in clinic for 30 minutes after injection, and to report any adverse reactions to me immediately. No change in patient status post injection.

## 2021-12-19 DIAGNOSIS — G43.819 OTHER MIGRAINE WITHOUT STATUS MIGRAINOSUS, INTRACTABLE: ICD-10-CM

## 2021-12-20 RX ORDER — SUMATRIPTAN 100 MG/1
100 TABLET, FILM COATED ORAL
Qty: 12 TABLET | Refills: 1 | Status: SHIPPED | OUTPATIENT
Start: 2021-12-20 | End: 2022-06-29

## 2021-12-20 NOTE — TELEPHONE ENCOUNTER
Medication:   Requested Prescriptions     Pending Prescriptions Disp Refills    SUMAtriptan (IMITREX) 100 MG tablet [Pharmacy Med Name: SUMATRIPTAN SUCC 100 MG TABLET] 12 tablet 1     Sig: TAKE 1 TABLET BY MOUTH ONCE AS NEEDED FOR MIGRAINE     Last Filled:  10/28/21    Last appt: 11/30/2021   Next appt: 12/28/2021    Last OARRS: No flowsheet data found.

## 2021-12-22 ENCOUNTER — NURSE ONLY (OUTPATIENT)
Dept: ENT CLINIC | Age: 26
End: 2021-12-22
Payer: COMMERCIAL

## 2021-12-22 DIAGNOSIS — J30.9 ALLERGIC RHINITIS, UNSPECIFIED SEASONALITY, UNSPECIFIED TRIGGER: ICD-10-CM

## 2021-12-22 PROCEDURE — 95117 IMMUNOTHERAPY INJECTIONS: CPT | Performed by: OTOLARYNGOLOGY

## 2021-12-22 RX ORDER — AMITRIPTYLINE HYDROCHLORIDE 10 MG/1
TABLET, FILM COATED ORAL
Qty: 30 TABLET | Refills: 2 | Status: SHIPPED | OUTPATIENT
Start: 2021-12-22 | End: 2022-02-28

## 2021-12-22 NOTE — PROGRESS NOTES
Correct serum vials verified by patient and nurse. After obtaining consent, and per orders of Dr Pancho Harkins, injections of allergy serum given by MADHAV Faria RN. Patient instructed to remain in clinic for 30 minutes after injection, and to report any adverse reactions to me immediately. No change in patient status post injection.

## 2021-12-27 ENCOUNTER — NURSE ONLY (OUTPATIENT)
Dept: ENT CLINIC | Age: 26
End: 2021-12-27
Payer: COMMERCIAL

## 2021-12-27 DIAGNOSIS — J30.9 ALLERGIC RHINITIS, UNSPECIFIED SEASONALITY, UNSPECIFIED TRIGGER: ICD-10-CM

## 2021-12-27 PROCEDURE — 95117 IMMUNOTHERAPY INJECTIONS: CPT | Performed by: OTOLARYNGOLOGY

## 2021-12-27 NOTE — PROGRESS NOTES
Correct serum vials verified by patient and nurse. After obtaining consent, and per orders of Dr Gisela Erickson, injections of allergy serum given by Fabrice Edwards RN. Patient instructed to remain in clinic for 30 minutes after injection, and to report any adverse reactions to me immediately. No change in patient status post injection.

## 2021-12-28 ENCOUNTER — OFFICE VISIT (OUTPATIENT)
Dept: PRIMARY CARE CLINIC | Age: 26
End: 2021-12-28
Payer: COMMERCIAL

## 2021-12-28 VITALS
BODY MASS INDEX: 33.77 KG/M2 | HEART RATE: 71 BPM | OXYGEN SATURATION: 99 % | SYSTOLIC BLOOD PRESSURE: 130 MMHG | DIASTOLIC BLOOD PRESSURE: 80 MMHG | TEMPERATURE: 98.5 F | RESPIRATION RATE: 12 BRPM | HEIGHT: 60 IN | WEIGHT: 172 LBS

## 2021-12-28 DIAGNOSIS — G43.819 OTHER MIGRAINE WITHOUT STATUS MIGRAINOSUS, INTRACTABLE: Primary | ICD-10-CM

## 2021-12-28 PROCEDURE — G8417 CALC BMI ABV UP PARAM F/U: HCPCS | Performed by: FAMILY MEDICINE

## 2021-12-28 PROCEDURE — G8484 FLU IMMUNIZE NO ADMIN: HCPCS | Performed by: FAMILY MEDICINE

## 2021-12-28 PROCEDURE — 99213 OFFICE O/P EST LOW 20 MIN: CPT | Performed by: FAMILY MEDICINE

## 2021-12-28 PROCEDURE — 1036F TOBACCO NON-USER: CPT | Performed by: FAMILY MEDICINE

## 2021-12-28 PROCEDURE — G8427 DOCREV CUR MEDS BY ELIG CLIN: HCPCS | Performed by: FAMILY MEDICINE

## 2021-12-28 RX ORDER — TOPIRAMATE 100 MG/1
100 TABLET, FILM COATED ORAL DAILY
COMMUNITY
Start: 2021-12-15 | End: 2022-01-11

## 2021-12-28 ASSESSMENT — ENCOUNTER SYMPTOMS
GASTROINTESTINAL NEGATIVE: 1
RESPIRATORY NEGATIVE: 1
EYES NEGATIVE: 1

## 2021-12-28 NOTE — PROGRESS NOTES
SUBJECTIVE:  Patient ID: Fatmata Munson is a 27 y.o. y.o. female     Migraine   Pertinent negatives include no dizziness, numbness, seizures or weakness. Mental Health Problem    Additional symptoms of the illness do not include agitation, headaches or seizures. Headache: Patient presents for follow up onheadache, her migraine headache is getting worse Maxalt was not helping much Imitrex was sent to the wrong pharmacy could not get it symptoms began about several months ago. Generally, the headaches last about a few hours and occur frequent, daily. The headache do not seem to be related to any time of the day. She is doing very well on her current combination of Topamax and Elavil at night she also started some physical therapy with her chiropractor and that helped tremendously with the massage for the neck according to the patient  She try to make a appointment with neurology headache specialist given appointment in May.   But overall she is doing a lot better she will continue with the same,    Past Medical History:   Diagnosis Date    Anxiety     Atypical migraine     [facility] 2 [facility] ([facility]     Borderline personality disorder ([facility]     Depression     Gestational diabetes     H/O drug abuse ([facility] [facility]     hx of heroin/cocaine    IBS (irritable bowel syndrome)     Obese     PCOS (polycystic ovarian syndrome)       Past Surgical History:   Procedure Laterality Date    COLONOSCOPY      ENDOSCOPY, COLON, DIAGNOSTIC      SEPTOPLASTY N/A 7/26/2021    SPHENOIDOTOMY RIGHT, BILATERAL ANTERIOR ETHMOIDECTOMY, BILATERAL INFERIOR TURBINATE REDUCTION, BILATERAL MAXILLARY ANTROSTOMY AND SEPTOPLASTY, NASAL SCOPE performed by Sylvia Rodriguez DO at [address] EXTRACTION       Family History   Problem Relation Age of Onset    Other Maternal Grandmother         demetia    Heart Disease Maternal Grandmother     High Cholesterol Maternal Grandfather     Diabetes Maternal Grandfather     High Blood Pressure Maternal Grandfather     Heart Disease Maternal Grandfather      Social History     Socioeconomic History    Marital status: Life Partner     Spouse name: None    Number of children: None    Years of education: None    Highest education level: None   Occupational History     Comment: Refused to answer   Tobacco Use    Smoking status: Never Smoker    Smokeless tobacco: Never Used   Vaping Use    Vaping Use: Never used   Substance and Sexual Activity    Alcohol use: Not Currently    Drug use: Not Currently     Comment: heroin-snort 5 years ago    Sexual activity: Yes     Partners: Male   Other Topics Concern    None   Social History Narrative    None     Social Determinants of Health     Financial Resource Strain: Low Risk     Difficulty of Paying Living Expenses: Not hard at all   Food Insecurity: No Food Insecurity    Worried About Running Out of Food in the Last Year: Never true    Mike of Food in the Last Year: Never true   Transportation Needs: No Transportation Needs    Lack of Transportation (Medical): No    Lack of Transportation (Non-Medical): No   Physical Activity: Sufficiently Active    Days of Exercise per Week: 6 days    Minutes of Exercise per Session: 40 min   Stress: No Stress Concern Present    Feeling of Stress : Not at all   Social Connections:  Moderately Isolated    Frequency of Communication with Friends and Family: Once a week    Frequency of Social Gatherings with Friends and Family: Twice a week    Attends Mosque Services: Never    Active Member of Clubs or Organizations: No    Attends Club or Organization Meetings: Never    Marital Status: Living with partner   Intimate Partner Violence:     Fear of Current or Ex-Partner: Not on file    Emotionally Abused: Not on file    Physically Abused: Not on file    Sexually Abused: Not on file   Housing Stability: [address] Unable to Pay for Housing in the Last Year: No    [facility]    Unstable Housing in the Last Year: No     Current Outpatient Medications   Medication Sig Dispense Refill    topiramate (TOPAMAX) 100 MG tablet Take 100 mg by mouth daily       amitriptyline (ELAVIL) 10 MG tablet TAKE 1 TABLET BY MOUTH EVERY DAY AT NIGHT 30 tablet 2    cetirizine (ZYRTEC) 10 MG tablet Take 10 mg by mouth daily      albuterol sulfate HFA (VENTOLIN HFA) 108 (90 Base) MCG/ACT inhaler Inhale 2 puffs into the lungs every 6 hours as needed for Wheezing or Shortness of Breath (cough) 54 g 1    EPINEPHrine (EPIPEN) 0.3 MG/0.3ML SOAJ injection Use as directed for allergic reaction 1 each 1    metFORMIN (GLUCOPHAGE) 500 MG tablet Take 500 mg by mouth 2 times daily (with meals)       acyclovir (ZOVIRAX) 400 MG tablet Take 400 mg by mouth      SUMAtriptan (IMITREX) 100 MG tablet TAKE 1 TABLET BY MOUTH ONCE AS NEEDED FOR MIGRAINE 12 tablet 1    fluticasone (FLONASE) 50 MCG/ACT nasal spray 2 sprays by Nasal route daily 2 Bottle 5    acetaminophen (APAP EXTRA STRENGTH) 500 MG tablet Take 1 tablet by mouth every 6 hours as needed for Pain Alternate every 3 hours with ibuprofen 60 tablet 0     No current facility-administered medications for this visit. Allergies   Allergen Reactions    Nickel Rash     Skin began to excoriate    Lexapro [Escitalopram Oxalate]      Jittery and could not sleep       Review of Systems   Constitutional: Negative. HENT: Negative. Eyes: Negative. Respiratory: Negative. Cardiovascular: Negative. Gastrointestinal: Negative. Neurological: Negative for dizziness, tremors, seizures, syncope, facial asymmetry, speech difficulty, weakness, light-headedness, numbness and headaches. Psychiatric/Behavioral: Negative for agitation, decreased concentration and sleep disturbance. All other systems reviewed and are negative.       OBJECTIVE:  /80 (Site: Left Upper Arm, Position: Sitting, Cuff Size: Large Adult)   Pulse 71   Temp 98.5 °F (36.9 °C) (Temporal)   Resp 12   Ht 5' (1.524 m)   Wt 172 lb (78 kg)   LMP 12/25/2021   SpO2 99%   Breastfeeding No   BMI 33.59 kg/m²     Physical Exam  Vitals reviewed. Constitutional:       Appearance: Normal appearance. HENT:      Head: Normocephalic and atraumatic. Nose: Nose normal.   Eyes:      General: No scleral icterus. Conjunctiva/sclera: Conjunctivae normal.   Neck:      Thyroid: No thyromegaly. Vascular: No JVD. Cardiovascular:      Rate and Rhythm: Normal rate and regular rhythm. Heart sounds: Normal heart sounds. No murmur heard. No friction rub. No gallop. Pulmonary:      Effort: Pulmonary effort is normal.      Breath sounds: Normal breath sounds. No wheezing or rales. Abdominal:      General: Bowel sounds are normal. There is no distension. Palpations: Abdomen is soft. There is no mass. Tenderness: There is no abdominal tenderness. Hernia: No hernia is present. Lymphadenopathy:      Cervical: No cervical adenopathy. Skin:     Findings: No rash. Neurological:      Mental Status: She is alert and oriented to person, place, and time. ASSESSMENT:   Diagnosis Orders   1.  Other migraine without status migrainosus, intractable         PLAN:  Continue the same  Patient doing better  Keep diary of headaches  Any changes not needed

## 2022-01-03 ENCOUNTER — NURSE ONLY (OUTPATIENT)
Dept: ENT CLINIC | Age: 27
End: 2022-01-03
Payer: COMMERCIAL

## 2022-01-03 DIAGNOSIS — J30.9 ALLERGIC RHINITIS, UNSPECIFIED SEASONALITY, UNSPECIFIED TRIGGER: ICD-10-CM

## 2022-01-03 PROCEDURE — 95117 IMMUNOTHERAPY INJECTIONS: CPT | Performed by: OTOLARYNGOLOGY

## 2022-01-03 NOTE — PROGRESS NOTES
Correct serum vials verified by patient and nurse. After obtaining consent, and per orders of Dr Bipin Malloy, injections of allergy serum given by MADHAV Faria RN. Patient instructed to remain in clinic for 30 minutes after injection, and to report any adverse reactions to me immediately. No change in patient status post injection.

## 2022-01-10 ENCOUNTER — NURSE ONLY (OUTPATIENT)
Dept: ENT CLINIC | Age: 27
End: 2022-01-10
Payer: COMMERCIAL

## 2022-01-10 DIAGNOSIS — J30.9 ALLERGIC RHINITIS, UNSPECIFIED SEASONALITY, UNSPECIFIED TRIGGER: ICD-10-CM

## 2022-01-10 PROCEDURE — 95117 IMMUNOTHERAPY INJECTIONS: CPT | Performed by: OTOLARYNGOLOGY

## 2022-01-10 NOTE — PROGRESS NOTES
Correct serum vials verified by patient and nurse. After obtaining consent, and per orders of Dr Satya Alas, injections of allergy serum given by MADHAV Faria RN. Patient instructed to remain in clinic for 30 minutes after injection, and to report any adverse reactions to me immediately. No change in patient status post injection.

## 2022-01-11 RX ORDER — TOPIRAMATE 100 MG/1
TABLET, FILM COATED ORAL
Qty: 180 TABLET | Refills: 0 | Status: SHIPPED | OUTPATIENT
Start: 2022-01-11 | End: 2022-02-28

## 2022-01-11 NOTE — TELEPHONE ENCOUNTER
Medication:   Requested Prescriptions     Pending Prescriptions Disp Refills    topiramate (TOPAMAX) 100 MG tablet [Pharmacy Med Name: TOPIRAMATE 100 MG TABLET] 180 tablet      Sig: TAKE 1 TABLET BY MOUTH TWICE A DAY     Last Filled:  12/15/21    Last appt: 12/28/2021   Next appt: Visit date not found

## 2022-01-24 ENCOUNTER — NURSE ONLY (OUTPATIENT)
Dept: ENT CLINIC | Age: 27
End: 2022-01-24
Payer: COMMERCIAL

## 2022-01-24 VITALS — TEMPERATURE: 97.2 F

## 2022-01-24 DIAGNOSIS — J30.9 ALLERGIC RHINITIS, UNSPECIFIED SEASONALITY, UNSPECIFIED TRIGGER: ICD-10-CM

## 2022-01-24 PROCEDURE — 95117 IMMUNOTHERAPY INJECTIONS: CPT | Performed by: OTOLARYNGOLOGY

## 2022-01-24 NOTE — PROGRESS NOTES
Correct serum vials verified by patient and nurse. After obtaining consent, and per orders of Dr Mar Mcnair, injections of allergy serum given by MADHAV Faria RN. Last injection dose repeated. It has been 2 weeks since patient has had an injection. Patient instructed to remain in clinic for 30 minutes after injection, and to report any adverse reactions to me immediately. No change in patient status post injection.

## 2022-01-31 ENCOUNTER — NURSE ONLY (OUTPATIENT)
Dept: ENT CLINIC | Age: 27
End: 2022-01-31
Payer: COMMERCIAL

## 2022-01-31 VITALS — TEMPERATURE: 97.3 F

## 2022-01-31 DIAGNOSIS — J30.9 ALLERGIC RHINITIS, UNSPECIFIED SEASONALITY, UNSPECIFIED TRIGGER: ICD-10-CM

## 2022-01-31 PROCEDURE — 95117 IMMUNOTHERAPY INJECTIONS: CPT | Performed by: OTOLARYNGOLOGY

## 2022-01-31 NOTE — PROGRESS NOTES
Pt had local reaction on both arms after injections last week 20mm in size. No other symptoms. Will repeat doses this week. Correct serum vials verified by patient and nurse. After obtaining consent, and per orders of Dr Tahmina Ko, injections of allergy serum given by MADHAV Faria RN. Patient instructed to remain in clinic for 30 minutes after injection, and to report any adverse reactions to me immediately. No change in patient status post injection.

## 2022-02-07 ENCOUNTER — NURSE ONLY (OUTPATIENT)
Dept: ENT CLINIC | Age: 27
End: 2022-02-07
Payer: COMMERCIAL

## 2022-02-07 ENCOUNTER — TELEPHONE (OUTPATIENT)
Dept: ENT CLINIC | Age: 27
End: 2022-02-07

## 2022-02-07 VITALS — TEMPERATURE: 97.1 F

## 2022-02-07 DIAGNOSIS — J30.9 ALLERGIC RHINITIS, UNSPECIFIED SEASONALITY, UNSPECIFIED TRIGGER: ICD-10-CM

## 2022-02-07 PROCEDURE — 95117 IMMUNOTHERAPY INJECTIONS: CPT | Performed by: OTOLARYNGOLOGY

## 2022-02-10 ENCOUNTER — NURSE ONLY (OUTPATIENT)
Dept: ENT CLINIC | Age: 27
End: 2022-02-10
Payer: COMMERCIAL

## 2022-02-10 DIAGNOSIS — J30.9 ALLERGIC RHINITIS, UNSPECIFIED SEASONALITY, UNSPECIFIED TRIGGER: ICD-10-CM

## 2022-02-10 PROCEDURE — 95165 ANTIGEN THERAPY SERVICES: CPT | Performed by: OTOLARYNGOLOGY

## 2022-02-18 ENCOUNTER — NURSE ONLY (OUTPATIENT)
Dept: ENT CLINIC | Age: 27
End: 2022-02-18
Payer: COMMERCIAL

## 2022-02-18 VITALS — TEMPERATURE: 97.2 F

## 2022-02-18 DIAGNOSIS — J30.9 ALLERGIC RHINITIS, UNSPECIFIED SEASONALITY, UNSPECIFIED TRIGGER: ICD-10-CM

## 2022-02-18 PROCEDURE — 95117 IMMUNOTHERAPY INJECTIONS: CPT | Performed by: OTOLARYNGOLOGY

## 2022-02-18 NOTE — PROGRESS NOTES
Correct serum vials verified by patient and nurse. Consent obtained and SVT completed. SVT P=6mm wheal, M=7mm wheal.  After obtaining consent, and per orders of Dr Liya Ochoa, injections of allergy serum given by MADHAV Faria RN. Patient instructed to remain in clinic for 30 minutes after injection, and to report any adverse reactions to me immediately. No change in patient status post injection.

## 2022-02-23 ENCOUNTER — NURSE ONLY (OUTPATIENT)
Dept: ENT CLINIC | Age: 27
End: 2022-02-23
Payer: COMMERCIAL

## 2022-02-23 VITALS — TEMPERATURE: 97 F

## 2022-02-23 DIAGNOSIS — J30.9 ALLERGIC RHINITIS, UNSPECIFIED SEASONALITY, UNSPECIFIED TRIGGER: ICD-10-CM

## 2022-02-23 PROCEDURE — 95117 IMMUNOTHERAPY INJECTIONS: CPT | Performed by: OTOLARYNGOLOGY

## 2022-02-23 NOTE — PROGRESS NOTES
Correct serum vials verified by patient and nurse. After obtaining consent, and per orders of Dr Michael Cole, injections of allergy serum given by MADHAV Faria RN. Patient instructed to remain in clinic for 30 minutes after injection, and to report any adverse reactions to me immediately. No change in patient status post injection.

## 2022-02-28 ENCOUNTER — NURSE ONLY (OUTPATIENT)
Dept: ENT CLINIC | Age: 27
End: 2022-02-28
Payer: COMMERCIAL

## 2022-02-28 VITALS — TEMPERATURE: 97.5 F

## 2022-02-28 DIAGNOSIS — J30.9 ALLERGIC RHINITIS, UNSPECIFIED SEASONALITY, UNSPECIFIED TRIGGER: ICD-10-CM

## 2022-02-28 PROCEDURE — 95117 IMMUNOTHERAPY INJECTIONS: CPT | Performed by: OTOLARYNGOLOGY

## 2022-02-28 RX ORDER — AMITRIPTYLINE HYDROCHLORIDE 10 MG/1
TABLET, FILM COATED ORAL
Qty: 90 TABLET | Refills: 1 | Status: SHIPPED | OUTPATIENT
Start: 2022-02-28 | End: 2022-06-29

## 2022-02-28 RX ORDER — TOPIRAMATE 100 MG/1
TABLET, FILM COATED ORAL
Qty: 180 TABLET | Refills: 0 | Status: SHIPPED | OUTPATIENT
Start: 2022-02-28 | End: 2022-06-27

## 2022-02-28 NOTE — TELEPHONE ENCOUNTER
Medication:   Requested Prescriptions     Pending Prescriptions Disp Refills    topiramate (TOPAMAX) 100 MG tablet [Pharmacy Med Name: TOPIRAMATE 100 MG TABLET] 180 tablet 0     Sig: TAKE 1 TABLET BY MOUTH TWICE A DAY    amitriptyline (ELAVIL) 10 MG tablet [Pharmacy Med Name: AMITRIPTYLINE HCL 10 MG TAB] 90 tablet      Sig: TAKE 1 TABLET BY MOUTH EVERY DAY AT NIGHT       Last appt: 12/28/2021   Next appt: Visit date not found    Last OARRS: No flowsheet data found.

## 2022-02-28 NOTE — PROGRESS NOTES
Correct serum vials verified by patient and nurse. After obtaining consent, and per orders of Dr Jose Luis Ramirez, injections of allergy serum given by MADHAV Faria RN. Patient instructed to remain in clinic for 30 minutes after injection, and to report any adverse reactions to me immediately. No change in patient status post injection.
no

## 2022-03-03 ENCOUNTER — OFFICE VISIT (OUTPATIENT)
Dept: PRIMARY CARE CLINIC | Age: 27
End: 2022-03-03
Payer: COMMERCIAL

## 2022-03-03 VITALS
HEART RATE: 105 BPM | DIASTOLIC BLOOD PRESSURE: 78 MMHG | TEMPERATURE: 97.4 F | OXYGEN SATURATION: 99 % | WEIGHT: 170 LBS | SYSTOLIC BLOOD PRESSURE: 128 MMHG | BODY MASS INDEX: 33.2 KG/M2

## 2022-03-03 DIAGNOSIS — M25.512 ACUTE PAIN OF LEFT SHOULDER: Primary | ICD-10-CM

## 2022-03-03 DIAGNOSIS — G43.819 OTHER MIGRAINE WITHOUT STATUS MIGRAINOSUS, INTRACTABLE: ICD-10-CM

## 2022-03-03 PROCEDURE — G8417 CALC BMI ABV UP PARAM F/U: HCPCS | Performed by: FAMILY MEDICINE

## 2022-03-03 PROCEDURE — G8484 FLU IMMUNIZE NO ADMIN: HCPCS | Performed by: FAMILY MEDICINE

## 2022-03-03 PROCEDURE — 99214 OFFICE O/P EST MOD 30 MIN: CPT | Performed by: FAMILY MEDICINE

## 2022-03-03 PROCEDURE — 1036F TOBACCO NON-USER: CPT | Performed by: FAMILY MEDICINE

## 2022-03-03 PROCEDURE — G8427 DOCREV CUR MEDS BY ELIG CLIN: HCPCS | Performed by: FAMILY MEDICINE

## 2022-03-03 RX ORDER — CELECOXIB 200 MG/1
200 CAPSULE ORAL DAILY
Qty: 30 CAPSULE | Refills: 1 | Status: SHIPPED | OUTPATIENT
Start: 2022-03-03 | End: 2022-04-26

## 2022-03-03 ASSESSMENT — ENCOUNTER SYMPTOMS
GASTROINTESTINAL NEGATIVE: 1
EYES NEGATIVE: 1
RESPIRATORY NEGATIVE: 1

## 2022-03-03 NOTE — PROGRESS NOTES
SUBJECTIVE:  Patient ID: Fabián Friedman is a 32 y.o. y.o. female     HPI   Shoulder Pain: Patient complaints of left shoulder pain. This is evaluated as a personal injury. The pain is described as aching and throbbing. The onset of the pain was gradual, starting about several months ago. The pain occurs continuously and lasts 3 hours. Location is posterior, lateral, neck. No history of dislocation. Symptoms are aggravated by all activities. Symptoms are diminished by  rest.   Limited activities include: all activities. mild stiffness, no weakness, no swelling, no crepitus noted is reported. 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   She felt when she had the shoulder pain her HA gotten worse   She has been exercising she is not sure if she moving away she here  Refuses according to patient  Her headache has gotten better till recently with the worsening shoulder pain    Past Medical History:   Diagnosis Date    Anxiety     Atypical migraine     [facility] [address]     Borderline personality disorder ([facility] [address]     Depression     Gestational diabetes     H/O drug abuse ([facility] [address]     hx of heroin/cocaine    IBS (irritable bowel syndrome)     Obese     PCOS (polycystic ovarian syndrome)       Past Surgical History:   Procedure Laterality Date    COLONOSCOPY      ENDOSCOPY, COLON, DIAGNOSTIC      SEPTOPLASTY N/A 7/26/2021    SPHENOIDOTOMY RIGHT, BILATERAL ANTERIOR ETHMOIDECTOMY, BILATERAL INFERIOR TURBINATE REDUCTION, BILATERAL MAXILLARY ANTROSTOMY AND SEPTOPLASTY, NASAL SCOPE performed by Julia Mckeon DO at [address] OR    WISDOM TOOTH EXTRACTION       Family History   Problem Relation Age of Onset    Other Maternal Grandmother         demetia    Heart Disease Maternal Grandmother     High Cholesterol Maternal Grandfather     Diabetes Maternal Grandfather     High Blood Pressure Maternal Grandfather     Heart Disease Maternal Grandfather      Social History     Socioeconomic History  Marital status: Life Partner     Spouse name: None    Number of children: None    Years of education: None    Highest education level: None   Occupational History     Comment: Refused to answer   Tobacco Use    Smoking status: Never Smoker    Smokeless tobacco: Never Used   Vaping Use    Vaping Use: Never used   Substance and Sexual Activity    Alcohol use: Not Currently    Drug use: Not Currently     Comment: heroin-snort 5 years ago    Sexual activity: Yes     Partners: Male   Other Topics Concern    None   Social History Narrative    None     Social Determinants of Health     Financial Resource Strain: Low Risk     Difficulty of Paying Living Expenses: Not hard at all   Food Insecurity: No Food Insecurity    Worried About Running Out of Food in the Last Year: Never true    Mike of Food in the Last Year: Never true   Transportation Needs: No Transportation Needs    Lack of Transportation (Medical): No    Lack of Transportation (Non-Medical): No   Physical Activity: Sufficiently Active    Days of Exercise per Week: 6 days    Minutes of Exercise per Session: 40 min   Stress: No Stress Concern Present    Feeling of Stress : Not at all   Social Connections:  Moderately Isolated    Frequency of Communication with Friends and Family: Once a week    Frequency of Social Gatherings with Friends and Family: Twice a week    Attends Baptism Services: Never    Active Member of Clubs or Organizations: No    Attends Club or Organization Meetings: Never    Marital Status: Living with partner   Intimate Partner Violence:     Fear of Current or Ex-Partner: Not on file    Emotionally Abused: Not on file    Physically Abused: Not on file    Sexually Abused: Not on file   Housing Stability: [address] Unable to Pay for Housing in the Last Year: No    Number of [place] in the Last Year: 1    Unstable Housing in the Last Year: No     Current Outpatient Medications   Medication Sig Dispense Refill    topiramate (TOPAMAX) 100 MG tablet TAKE 1 TABLET BY MOUTH TWICE A  tablet 0    amitriptyline (ELAVIL) 10 MG tablet TAKE 1 TABLET BY MOUTH EVERY DAY AT NIGHT 90 tablet 1    cetirizine (ZYRTEC) 10 MG tablet Take 10 mg by mouth daily      albuterol sulfate HFA (VENTOLIN HFA) 108 (90 Base) MCG/ACT inhaler Inhale 2 puffs into the lungs every 6 hours as needed for Wheezing or Shortness of Breath (cough) 54 g 1    EPINEPHrine (EPIPEN) 0.3 MG/0.3ML SOAJ injection Use as directed for allergic reaction 1 each 1    metFORMIN (GLUCOPHAGE) 500 MG tablet Take 500 mg by mouth 2 times daily (with meals)       acyclovir (ZOVIRAX) 400 MG tablet Take 400 mg by mouth      SUMAtriptan (IMITREX) 100 MG tablet TAKE 1 TABLET BY MOUTH ONCE AS NEEDED FOR MIGRAINE 12 tablet 1    fluticasone (FLONASE) 50 MCG/ACT nasal spray 2 sprays by Nasal route daily 2 Bottle 5    acetaminophen (APAP EXTRA STRENGTH) 500 MG tablet Take 1 tablet by mouth every 6 hours as needed for Pain Alternate every 3 hours with ibuprofen 60 tablet 0     No current facility-administered medications for this visit. Allergies   Allergen Reactions    Nickel Rash     Skin began to excoriate    Lexapro [Escitalopram Oxalate]      Jittery and could not sleep       Review of Systems   Constitutional: Negative. HENT: Negative. Eyes: Negative. Respiratory: Negative. Cardiovascular: Negative. Gastrointestinal: Negative. Musculoskeletal: Positive for arthralgias. All other systems reviewed and are negative. OBJECTIVE:  /78 (Site: Right Upper Arm, Position: Sitting, Cuff Size: Medium Adult)   Pulse 105   Temp 97.4 °F (36.3 °C) (Infrared)   Wt 170 lb (77.1 kg)   LMP 02/17/2022 (Within Days)   SpO2 99%   BMI 33.20 kg/m²     Physical Exam  Vitals reviewed. Constitutional:       Appearance: Normal appearance. HENT:      Head: Normocephalic and atraumatic. Eyes:      General: No scleral icterus. Conjunctiva/sclera: Conjunctivae normal.   Neck:      Thyroid: No thyromegaly. Vascular: No JVD. Cardiovascular:      Rate and Rhythm: Normal rate and regular rhythm. Heart sounds: Normal heart sounds. No murmur heard. No friction rub. No gallop. Pulmonary:      Effort: Pulmonary effort is normal.      Breath sounds: Normal breath sounds. No wheezing or rales. Abdominal:      General: Bowel sounds are normal. There is no distension. Palpations: Abdomen is soft. There is no mass. Tenderness: There is no abdominal tenderness. Hernia: No hernia is present. Musculoskeletal:      Left shoulder: Tenderness present. No swelling, deformity, effusion, laceration, bony tenderness or crepitus. Normal range of motion. Normal strength. Normal pulse. Lymphadenopathy:      Cervical: No cervical adenopathy. Skin:     Findings: No rash. Neurological:      Mental Status: She is alert and oriented to person, place, and time. ASSESSMENT:   Diagnosis Orders   1. Acute pain of left shoulder  [facility]   2.  Other migraine without status migrainosus, intractable           PLAN:  See orders  Start PT  Keep a diary of headaches  If not better she will call

## 2022-03-07 ENCOUNTER — NURSE ONLY (OUTPATIENT)
Dept: ENT CLINIC | Age: 27
End: 2022-03-07
Payer: COMMERCIAL

## 2022-03-07 DIAGNOSIS — J30.9 ALLERGIC RHINITIS, UNSPECIFIED SEASONALITY, UNSPECIFIED TRIGGER: ICD-10-CM

## 2022-03-07 PROCEDURE — 95117 IMMUNOTHERAPY INJECTIONS: CPT | Performed by: OTOLARYNGOLOGY

## 2022-03-14 ENCOUNTER — HOSPITAL ENCOUNTER (OUTPATIENT)
Dept: PHYSICAL THERAPY | Age: 27
Setting detail: THERAPIES SERIES
Discharge: HOME | End: 2022-03-14
Payer: COMMERCIAL

## 2022-03-14 ENCOUNTER — NURSE ONLY (OUTPATIENT)
Dept: ENT CLINIC | Age: 27
End: 2022-03-14
Payer: COMMERCIAL

## 2022-03-14 DIAGNOSIS — J30.9 ALLERGIC RHINITIS, UNSPECIFIED SEASONALITY, UNSPECIFIED TRIGGER: ICD-10-CM

## 2022-03-14 PROCEDURE — 97110 THERAPEUTIC EXERCISES: CPT

## 2022-03-14 PROCEDURE — 95117 IMMUNOTHERAPY INJECTIONS: CPT | Performed by: OTOLARYNGOLOGY

## 2022-03-14 PROCEDURE — 97161 PT EVAL LOW COMPLEX 20 MIN: CPT

## 2022-03-14 NOTE — FLOWSHEET NOTE
[place] [id number]      Physical Therapy Treatment Note/ Progress Report:     Date:  3/14/2022    Patient Name:  Angel Ryan    :  1995  MRN: [id number]  Restrictions/Precautions:    Medical/Treatment Diagnosis Information:  · Diagnosis: M25.512 L shoulder pain  · Treatment Diagnosis: M25.512 L shoulder pain  Insurance/Certification information:   [facility]  Physician Information:  Referring Practitioner: Dr. Debbie Valdez  Has the plan of care been signed (Y/N):        []  Yes  [x]  No     Date of Patient follow up with Physician: not scheduled    Is this a Progress Report:     []  Yes  [x]  No     If Yes:  Date Range for reporting period:  Beginning:  ------------ Ending:     Progress report will be due (10 Rx or 30 days whichever is less): 35     Recertification will be due (POC Duration  / 90 days whichever is less): 22      Visit # Insurance Allowable Auth Required   In Person 1 BMN  [x]  Yes     []  No    Tele Health   []  Yes     []  No    Total 1       Functional Scale: UEFI: NV    Date assessed:  NV      Latex Allergy:  [x]NO      []YES  Preferred Language for Healthcare:   [x]English       []other:    Pain level:  3-8/10     SUBJECTIVE:  See eval    OBJECTIVE: See eval   Observation:    Test measurements:      RESTRICTIONS/PRECAUTIONS: none    Exercises/Interventions:   Therapeutic Ex (71547) Sets/sec Reps Notes/CUES HEP   Row 2 10 Black TB, Tried ext (? pain) x   IR/ER 1 10 GTB x   pec S at wall 20\" 3  x   Open book NV                                                       Pt edu: HEP, dx, POC       Manual Intervention (24390)       Thoracic mob NV      SOR, manual traction NV                                  NMR re-education (98041)   CUES NEEDED                                                                   Therapeutic Activity (79553)                                          [employer] access code:    Andrea Dougherty Therapeutic Exercise and NMR EXR  [] (38708) Provided verbal/tactile cueing for activities related to strengthening, flexibility, endurance, ROM  for improvements in scapular, scapulothoracic and UE control with self care, reaching, carrying, lifting, house/yardwork, driving/computer work.    [] (14792) Provided verbal/tactile cueing for activities related to improving balance, coordination, kinesthetic sense, posture, motor skill, proprioception  to assist with  scapular, scapulothoracic and UE control with self care, reaching, carrying, lifting, house/yardwork, driving/computer work. Therapeutic Activities:    [] (54299 or 43521) Provided verbal/tactile cueing for activities related to improving balance, coordination, kinesthetic sense, posture, motor skill, proprioception and motor activation to allow for proper function of scapular, scapulothoracic and UE control with self care, carrying, lifting, driving/computer work.      Home Exercise Program:    [x] (89870) Reviewed/Progressed HEP activities related to strengthening, flexibility, endurance, ROM of scapular, scapulothoracic and UE control with self care, reaching, carrying, lifting, house/yardwork, driving/computer work  [] (38716) Reviewed/Progressed HEP activities related to improving balance, coordination, kinesthetic sense, posture, motor skill, proprioception of scapular, scapulothoracic and UE control with self care, reaching, carrying, lifting, house/yardwork, driving/computer work      Manual Treatments:  PROM / STM / Oscillations-Mobs:  G-I, II, III, IV (PA's, Inf., Post.)  [] (18230) Provided manual therapy to mobilize soft tissue/joints of cervical/CT, scapular GHJ and UE for the purpose of modulating pain, promoting relaxation,  increasing ROM, reducing/eliminating soft tissue swelling/inflammation/restriction, improving soft tissue extensibility and allowing for proper ROM for normal function with self care, reaching, carrying, lifting, house/yardwork, driving/computer work    Modalities:     [] GAME READY (VASO)- for significant edema, swelling, pain control. Charges:  Timed Code Treatment Minutes: 25   Total Treatment Minutes:  45      [x] EVAL (LOW) 31724 (typically 20 minutes face-to-face)  [] EVAL (MOD) 31996 (typically 30 minutes face-to-face)  [] EVAL (HIGH) 97254 (typically 45 minutes face-to-face)  [] RE-EVAL     [x] KM(14272) x 2    [] IONTO  [] NMR (38688) x     [] VASO  [] Manual (31232) x     [] Other:  [] TA x      [] Mech Traction (74150)  [] ES(attended) (63990)      [] ES (un) (84221):    ASSESSMENT:  See eval      GOALS:     Patient stated goal: relieve some of the pain, be able to continue my new workout     Therapist goals for Patient:   Short Term Goals: To be achieved in: 2 weeks  1. Independent in HEP and progression per patient tolerance, in order to prevent re-injury. []? Progressing: []? Met: []? Not Met: []? Adjusted      2. Patient will have a decrease in pain to facilitate improvement in movement, function, and ADLs as indicated by Functional Deficits. []? Progressing: []? Met: []? Not Met: []? Adjusted      Long Term Goals: To be achieved in: 12 weeks  1. Disability index score of 10% or less for the DASH to assist with reaching prior level of function. []? Progressing: []? Met: []? Not Met: []? Adjusted      2. Patient will demonstrate increased AROM to WNL to allow for proper joint functioning as indicated by patients Functional Deficits. []? Progressing: []? Met: []? Not Met: []? Adjusted      3. Patient will demonstrate an increase in Strength to 5/5 to allow for proper functional mobility as indicated by patients Functional Deficits. []? Progressing: []? Met: []? Not Met: []? Adjusted      4. Patient will return to all functional activities without increased symptoms or restriction. []? Progressing: []? Met: []? Not Met: []? Adjusted      5.  Pt will exhibit self(patient specific functional goal) []? Progressing: []? Met: []? Not Met: []? Adjusted           Access Code: [email or web address]  URL: [email or web address]  Date: 03/14/2022  Prepared by: Frank Allen    Exercises  Standing Pec Stretch at [employer] [place] - 1 x daily - 7 x weekly - 1 sets - 3 reps - 20 sec hold  Standing Bilateral Low Shoulder Row with Anchored Resistance - 1 x daily - 7 x weekly - 3 sets - 10 reps  Standing Shoulder Internal Rotation with Anchored Resistance - 1 x daily - 7 x weekly - 3 sets - 10 reps  Shoulder External Rotation with Anchored Resistance - 1 x daily - 7 x weekly - 3 sets - 10 reps      Overall Progression Towards Functional goals/ Treatment Progress Update:  [] Patient is progressing as expected towards functional goals listed. [] Progression is slowed due to complexities/Impairments listed. [] Progression has been slowed due to co-morbidities. [x] Plan just implemented, too soon to assess goals progression <30days   [] Goals require adjustment due to lack of progress  [] Patient is not progressing as expected and requires additional follow up with physician  [] Other    Prognosis for POC: [x] Good [] Fair  [] Poor      Patient requires continued skilled intervention: [x] Yes  [] No    Treatment/Activity Tolerance:  [x] Patient able to complete treatment  [] Patient limited by fatigue  [] Patient limited by pain    [] Patient limited by other medical complications  [] Other:       PLAN: See eval  [] Continue per plan of care [] Alter current plan (see comments above)  [x] Plan of care initiated [] Hold pending MD visit [] Discharge    Electronically signed by:  Erlinda Murrell PT    Note: If patient does not return for scheduled/ recommended follow up visits, this note will serve as a discharge from care along with most recent update on progress.

## 2022-03-14 NOTE — PROGRESS NOTES
Correct serum vials verified by patient and nurse. After obtaining consent, and per orders of Dr Clark Soares, injections of allergy serum given by MADHAV Faria RN. Patient instructed to remain in clinic for 30 minutes after injection, and to report any adverse reactions to me immediately. No change in patient status post injection.

## 2022-03-14 NOTE — PLAN OF CARE
UPPER EXTREMITY EVAL    [facility]  Orthopaedics and Sports Rehabilitation, [facility]                                                       Physical Therapy Certification    Dear Referring Practitioner: Dr. Stevie Nance,    We had the pleasure of evaluating the following patient for physical therapy services at [address] [place] [address]. A summary of our findings can be found in the initial assessment below. This includes our plan of care. If you have any questions or concerns regarding these findings, please do not hesitate to contact me at the office phone number checked above. Thank you for the referral.       Physician Signature:_______________________________Date:__________________  By signing above (or electronic signature), therapists plan is approved by physician      Patient: Sydney Lopez   : 1995   MRN: [id number]  Referring Physician: Referring Practitioner: Dr. Stevie Nance      Evaluation Date: 3/14/2022      Medical Diagnosis Information:  Diagnosis: M25.512 L shoulder pain   Treatment Diagnosis: M25.512 L shoulder pain                                         Insurance information:  [employer]    Precautions/ Contra-indications: none      C-SSRS Triggered by Intake questionnaire (Past 2 wk assessment):   [x] No, Questionnaire did not trigger screening.   [] Yes, Patient intake triggered further evaluation      [] C-SSRS Screening completed  [] PCP notified via Plan of Care  [] Emergency services notified     Latex Allergy:  [x]NO      []YES  Preferred Language for Healthcare:   [x]English       []other:    SUBJECTIVE: Patient stated complaint: Pt reports after her twin pregnancy (2 yeas ago) she was bottle-feeding and holding her babies a lot and thinks the forward position and forward nature of that caused some pain in her LEFT shoulder. Pt states pain is in L shoulder blade region that shoots up into the L side of her neck.  Pt also notes L sided migraines over the past year that she can now connect to pain in L shoulder/shoulder blade. However she notes has tried sinus surgery as well as allergy shots over the past year to try to treat her migraines. Pt is RHD. Pt also notes pain is worsening overall to a constant throbbing. Pt reports that is why she went to PCP. Pt notes she is now being treated for possible ADHD dx and is figuring out medication and dosages . Relevant Medical History:(-) latex allergy, (-) adhesive sensitivity, (-) pacemaker, (-) metal/electrical implants, (-) heart history, (-) CA, (-) stroke, (-) seizure, (-) diabetes, (-) asthma or SOB, note hx of drug abuse    Functional Disability Index: UEFI: NV    Pain Scale: 2-6/10  Easing factors: self massage techniques against wall in shoulder blade, celebrex (has caused dull pain), muscle relaxer  Provocative factors: stress, exercose     Type: [x]Constant   []Intermittent  [x]Radiating (will radiate into arm and forearm) []Localized []other:     Numbness/Tingling: tingling and numbness into fingers and medial aspect of L forearm    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 Cervical Extension 63 °     Cervical SB R 10 ° , L 22 °     Cervical rotation          ROM Left Right   Shoulder Flex 170 ° p! (stiffness)    Shoulder Abd 160 ° p!  (stiffness)    Shoulder ER Functional: equal to CL side but very painful    Shoulder IR Functional: equal to Cl side                   Strength  Left Right   Shoulder Flex 4/5 5/5   Shoulder Scap 4/5 5/5   Shoulder ER 4/5 5/5   Shoulder IR 4/5 5/5               Reflexes/Sensation:    [x]Dermatomes/Myotomes intact    [x]Reflexes equal and normal bilaterally   []Other:    Joint mobility:    []Normal    [x]Hypo: scapulothoracic, thoracic NV   []Hyper    Palpation: TTP: kj scap musculature, upper trap, suboccipitals, cervical paraspinals    Functional Mobility/Transfers: not limited    Posture: rounded shoulders, flattened thoracic spine    Bandages/Dressings/Incisions: n/a    Gait: (include devices/WB status): WNL    Orthopedic Special Tests: (-) empty can, (-) IRRST                       [x] Patient history, allergies, meds reviewed. Medical chart reviewed. See intake form. Review Of Systems (ROS):  [x]Performed Review of systems (Integumentary, CardioPulmonary, Neurological) by intake and observation. Intake form has been scanned into medical record. Patient has been instructed to contact their primary care physician regarding ROS issues if not already being addressed at this time.       Co-morbidities/Complexities (which will affect course of rehabilitation):   [x]None           Arthritic conditions   []Rheumatoid arthritis (M05.9)  []Osteoarthritis (M19.91)   Cardiovascular conditions   []Hypertension (I10)  []Hyperlipidemia (E78.5)  []Angina pectoris (I20)  []Atherosclerosis (I70)   Musculoskeletal conditions   []Disc pathology   []Congenital spine pathologies   []Prior surgical intervention  []Osteoporosis (M81.8)  []Osteopenia (M85.8)   Endocrine conditions   []Hypothyroid (E03.9)  []Hyperthyroid Gastrointestinal conditions   []Constipation (N76.43)   Metabolic conditions   []Morbid obesity (E66.01)  []Diabetes type 1(E10.65) or 2 (E11.65)   []Neuropathy (G60.9)     Pulmonary conditions   []Asthma (J45)  []Coughing   []COPD (J44.9)   Psychological Disorders  []Anxiety (F41.9)  []Depression (F32.9)   []Other:   []Other:          Barriers to/and or personal factors that will affect rehab potential:              []Age  []Sex              []Motivation/Lack of Motivation                        []Co-Morbidities              []Cognitive Function, education/learning barriers              []Environmental, home barriers              []profession/work barriers  []past PT/medical experience  []other:  Justification: none     Falls Risk Assessment (30 days):   [x] Falls Risk assessed and no intervention required. [] Falls Risk assessed and Patient requires intervention due to being higher risk   TUG score (>12s at risk):     [] Falls education provided, including       ASSESSMENT:   Functional Impairments   [x]Noted spinal or UE joint hypomobility   []Noted spinal or UE joint hypermobility   [x]Decreased UE functional ROM   [x]Decreased UE functional strength   []Abnormal reflexes/sensation/myotomal/dermatomal deficits   [x]Decreased RC/scapular/core strength and neuromuscular control   []other:      Functional Activity Limitations (from functional questionnaire and intake)   [x]Reduced ability to tolerate prolonged functional positions   []Reduced ability or difficulty with changes of positions or transfers between positions   [x]Reduced ability to maintain good posture and demonstrate good body mechanics with sitting, bending, and lifting   [] Reduced ability or tolerance with driving and/or computer work   []Reduced ability to sleep   [x]Reduced ability to perform lifting, reaching, carrying tasks   [x]Reduced ability to tolerate impact through UE   [x]Reduced ability to reach behind back   [x]Reduced ability to  or hold objects   [x]Reduced ability to throw or toss an object   []other:    Participation Restrictions   []Reduced participation in self care activities   [x]Reduced participation in home management activities   [x]Reduced participation in work activities   [x]Reduced participation in social activities. [x]Reduced participation in sport/recreation activities. Classification:   []Signs/symptoms consistent with post-surgical status including decreased ROM, strength and function.   []Signs/symptoms consistent with joint sprain/strain   [x]Signs/symptoms consistent with shoulder impingement   []Signs/symptoms consistent with shoulder/elbow/wrist tendinopathy   []Signs/symptoms consistent with Rotator cuff tear   []Signs/symptoms consistent with labral tear   []Signs/symptoms consistent with postural dysfunction    []Signs/symptoms consistent with Glenohumeral IR Deficit - <45 degrees   []Signs/symptoms consistent with facet dysfunction of cervical/thoracic spine    []Signs/symptoms consistent with pathology which may benefit from Dry needling     []other:     Prognosis/Rehab Potential:      []Excellent   [x]Good    []Fair   []Poor    Tolerance of evaluation/treatment:    []Excellent   [x]Good    []Fair   []Poor    Physical Therapy Evaluation Complexity Justification  [x] A history of present problem with:  [x] no personal factors and/or comorbidities that impact the plan of care;  []1-2 personal factors and/or comorbidities that impact the plan of care  []3 personal factors and/or comorbidities that impact the plan of care  [x] An examination of body systems using standardized tests and measures addressing any of the following: body structures and functions (impairments), activity limitations, and/or participation restrictions;:  [] a total of 1-2 or more elements   [x] a total of 3 or more elements   [] a total of 4 or more elements   [x] A clinical presentation with:  [x] stable and/or uncomplicated characteristics   [] evolving clinical presentation with changing characteristics  [] unstable and unpredictable characteristics;   [x] Clinical decision making of [x] low, [] moderate, [] high complexity using standardized patient assessment instrument and/or measurable assessment of functional outcome.     [x] EVAL (LOW) 97657 (typically 20 minutes face-to-face)  [] EVAL (MOD) 64390 (typically 30 minutes face-to-face)  [] EVAL (HIGH) 82904 (typically 45 minutes face-to-face)  [] RE-EVAL     PLAN:  Frequency/Duration:  1-2 days per week for 12 Weeks:  INTERVENTIONS:  [x] Therapeutic exercise including: strength training, ROM, for Upper extremity and core   [x] NMR activation and proprioception for UE, scap and Core   [x] Manual therapy as indicated for shoulder, scapula and spine to include: Dry Needling/IASTM, STM, PROM, Gr I-IV mobilizations, manipulation. [x] Modalities as needed that may include: thermal agents, E-stim, Biofeedback, US, iontophoresis as indicated  [x] Patient education on joint protection, postural re-education, activity modification, progression of HEP. HEP instruction: Refer to [address] [place] [address] access code and exercises on the 1st visit treatment note    GOALS:  Patient stated goal: relieve some of the pain, be able to continue my new workout    Therapist goals for Patient:   Short Term Goals: To be achieved in: 2 weeks  1. Independent in HEP and progression per patient tolerance, in order to prevent re-injury. [] Progressing: [] Met: [] Not Met: [] Adjusted     2. Patient will have a decrease in pain to facilitate improvement in movement, function, and ADLs as indicated by Functional Deficits. [] Progressing: [] Met: [] Not Met: [] Adjusted     Long Term Goals: To be achieved in: 12 weeks  1. Disability index score of 10% or less for the DASH to assist with reaching prior level of function. [] Progressing: [] Met: [] Not Met: [] Adjusted     2. Patient will demonstrate increased AROM to WNL to allow for proper joint functioning as indicated by patients Functional Deficits. [] Progressing: [] Met: [] Not Met: [] Adjusted     3. Patient will demonstrate an increase in Strength to 5/5 to allow for proper functional mobility as indicated by patients Functional Deficits. [] Progressing: [] Met: [] Not Met: [] Adjusted     4. Patient will return to all functional activities without increased symptoms or restriction. [] Progressing: [] Met: [] Not Met: [] Adjusted     5.  Pt will exhibit self(patient specific functional goal)    [] Progressing: [] Met: [] Not Met: [] Adjusted      Electronically signed by:  Janifer Fabry, PT

## 2022-03-21 ENCOUNTER — TELEPHONE (OUTPATIENT)
Dept: ENT CLINIC | Age: 27
End: 2022-03-21

## 2022-03-21 ENCOUNTER — NURSE ONLY (OUTPATIENT)
Dept: ENT CLINIC | Age: 27
End: 2022-03-21
Payer: COMMERCIAL

## 2022-03-21 DIAGNOSIS — J30.9 ALLERGIC RHINITIS, UNSPECIFIED SEASONALITY, UNSPECIFIED TRIGGER: ICD-10-CM

## 2022-03-21 PROCEDURE — 95117 IMMUNOTHERAPY INJECTIONS: CPT | Performed by: OTOLARYNGOLOGY

## 2022-03-21 NOTE — TELEPHONE ENCOUNTER
Patient has been tolerating allergy immunotherapy injections without incident. Recommend advancing Prescription Treatment Set for Pollens to the next stronger concentration. Recommend continuing on the Escalated Build Up. Please advise.

## 2022-03-21 NOTE — PROGRESS NOTES
Large local reaction, right arm, >30mm after last injection. Dose decreased to 0.35ml. Correct serum vials verified by patient and nurse. After obtaining consent, and per orders of Dr Tj Fleming, injections of allergy serum given by MADHAV Faria RN. Patient instructed to remain in clinic for 30 minutes after injection, and to report any adverse reactions to me immediately. No change in patient status post injection.

## 2022-03-23 ENCOUNTER — HOSPITAL ENCOUNTER (OUTPATIENT)
Dept: PHYSICAL THERAPY | Age: 27
Setting detail: THERAPIES SERIES
Discharge: HOME | End: 2022-03-23
Payer: COMMERCIAL

## 2022-03-23 PROCEDURE — 97140 MANUAL THERAPY 1/> REGIONS: CPT

## 2022-03-23 PROCEDURE — 97110 THERAPEUTIC EXERCISES: CPT

## 2022-03-23 NOTE — FLOWSHEET NOTE
[facility] [place] [address]      Physical Therapy Treatment Note/ Progress Report:     Date:  3/23/2022    Patient Name:  Yash Issa    :  1995  MRN: [id number]  Restrictions/Precautions:    Medical/Treatment Diagnosis Information:  · Diagnosis: M25.512 L shoulder pain  · Treatment Diagnosis: M25.512 L shoulder pain  Insurance/Certification information:   [facility]  Physician Information:  Referring Practitioner: Dr. Dimitris Corrales  Has the plan of care been signed (Y/N):        []  Yes  [x]  No     Date of Patient follow up with Physician: not scheduled    Is this a Progress Report:     []  Yes  [x]  No     If Yes:  Date Range for reporting period:  Beginning:  ------------ Ending:     Progress report will be due (10 Rx or 30 days whichever is less):      Recertification will be due (POC Duration  / 90 days whichever is less): 22      Visit # Insurance Allowable Auth Required   In Person 2 BMN  [x]  Yes     []  No    Tele Health   []  Yes     []  No    Total 2       Functional Scale: UEFI: NV    Date assessed:  NV      Latex Allergy:  [x]NO      []YES  Preferred Language for Healthcare:   [x]English       []other:    Pain level:  3/10     SUBJECTIVE:  Pt reports her shoulder feels ok right now but was really hurting. And she also continues to note no migraines since starting anti-inflammatory medication.       OBJECTIVE:    Observation:    Test measurements:      RESTRICTIONS/PRECAUTIONS: none    Exercises/Interventions:   Therapeutic Ex (90167) Sets/sec Reps Notes/CUES HEP   Row 2 10 Black TB, Tried ext (? pain) x   IR/ER 1 10 GTB x   pec S at wall 20\" 3  x   Open book 1, hold 5 sec 10     horiz ABD 3 10  x                                                    Manual Intervention (69973)       Thoracic mob x3      SOR, manual traction x8'      SL scap mobs x5'                           NMR re-education (69053)   CUES NEEDED Therapeutic Activity (01527)                                          [employer] access code:    [email or web address]           Therapeutic Exercise and NMR EXR  [x] (35959) Provided verbal/tactile cueing for activities related to strengthening, flexibility, endurance, ROM  for improvements in scapular, scapulothoracic and UE control with self care, reaching, carrying, lifting, house/yardwork, driving/computer work.    [] (75032) Provided verbal/tactile cueing for activities related to improving balance, coordination, kinesthetic sense, posture, motor skill, proprioception  to assist with  scapular, scapulothoracic and UE control with self care, reaching, carrying, lifting, house/yardwork, driving/computer work. Therapeutic Activities:    [] (56691 or 18821) Provided verbal/tactile cueing for activities related to improving balance, coordination, kinesthetic sense, posture, motor skill, proprioception and motor activation to allow for proper function of scapular, scapulothoracic and UE control with self care, carrying, lifting, driving/computer work.      Home Exercise Program:    [x] (25757) Reviewed/Progressed HEP activities related to strengthening, flexibility, endurance, ROM of scapular, scapulothoracic and UE control with self care, reaching, carrying, lifting, house/yardwork, driving/computer work  [] (46329) Reviewed/Progressed HEP activities related to improving balance, coordination, kinesthetic sense, posture, motor skill, proprioception of scapular, scapulothoracic and UE control with self care, reaching, carrying, lifting, house/yardwork, driving/computer work      Manual Treatments:  PROM / STM / Oscillations-Mobs:  G-I, II, III, IV (PA's, Inf., Post.)  [x] (26369) Provided manual therapy to mobilize soft tissue/joints of cervical/CT, scapular GHJ and UE for the purpose of modulating pain, promoting relaxation,  increasing ROM, reducing/eliminating soft tissue swelling/inflammation/restriction, improving soft tissue extensibility and allowing for proper ROM for normal function with self care, reaching, carrying, lifting, house/yardwork, driving/computer work    Modalities:  MHP x10 min (did not like ice)   [] GAME READY (VASO)- for significant edema, swelling, pain control. Charges:  Timed Code Treatment Minutes: 45   Total Treatment Minutes:  55      [] EVAL (LOW) 59641 (typically 20 minutes face-to-face)  [] EVAL (MOD) 61233 (typically 30 minutes face-to-face)  [] EVAL (HIGH) 81583 (typically 45 minutes face-to-face)  [] RE-EVAL     [x] LV(50607) x 2    [] IONTO  [] NMR (87398) x     [] VASO  [x] Manual (50554) x  1   [] Other:  [] TA x      [] Mech Traction (55894)  [] ES(attended) (57961)      [] ES (un) (82167):    ASSESSMENT:  Pt exhibits significant tightness in kj scap musculature and will benefit from continued ? myofascial tightness as well as improved posture strength and stability. GOALS:     Patient stated goal: relieve some of the pain, be able to continue my new workout     Therapist goals for Patient:   Short Term Goals: To be achieved in: 2 weeks  1. Independent in HEP and progression per patient tolerance, in order to prevent re-injury. []? Progressing: []? Met: []? Not Met: []? Adjusted      2. Patient will have a decrease in pain to facilitate improvement in movement, function, and ADLs as indicated by Functional Deficits. []? Progressing: []? Met: []? Not Met: []? Adjusted      Long Term Goals: To be achieved in: 12 weeks  1. Disability index score of 10% or less for the DASH to assist with reaching prior level of function. []? Progressing: []? Met: []? Not Met: []? Adjusted      2. Patient will demonstrate increased AROM to WNL to allow for proper joint functioning as indicated by patients Functional Deficits. []? Progressing: []? Met: []? Not Met: []? Adjusted      3.  Patient will demonstrate an increase in Strength to 5/5 to allow for proper functional mobility as indicated by patients Functional Deficits. []? Progressing: []? Met: []? Not Met: []? Adjusted      4. Patient will return to all functional activities without increased symptoms or restriction. []? Progressing: []? Met: []? Not Met: []? Adjusted      5. Pt will exhibit self(patient specific functional goal)    []? Progressing: []? Met: []? Not Met: []? Adjusted           Access Code: [email or web address]  URL: [email or web address]  Date: 03/14/2022  Prepared by: Sugar Gay    Exercises  Standing Pec Stretch at [place] - 1 x daily - 7 x weekly - 1 sets - 3 reps - 20 sec hold  Standing Bilateral Low Shoulder Row with Anchored Resistance - 1 x daily - 7 x weekly - 3 sets - 10 reps  Standing Shoulder Internal Rotation with Anchored Resistance - 1 x daily - 7 x weekly - 3 sets - 10 reps  Shoulder External Rotation with Anchored Resistance - 1 x daily - 7 x weekly - 3 sets - 10 reps      Overall Progression Towards Functional goals/ Treatment Progress Update:  [] Patient is progressing as expected towards functional goals listed. [] Progression is slowed due to complexities/Impairments listed. [] Progression has been slowed due to co-morbidities.   [x] Plan just implemented, too soon to assess goals progression <30days   [] Goals require adjustment due to lack of progress  [] Patient is not progressing as expected and requires additional follow up with physician  [] Other    Prognosis for POC: [x] Good [] Fair  [] Poor      Patient requires continued skilled intervention: [x] Yes  [] No    Treatment/Activity Tolerance:  [x] Patient able to complete treatment  [] Patient limited by fatigue  [] Patient limited by pain    [] Patient limited by other medical complications  [] Other:       PLAN: See eval  [x] Continue per plan of care [] Alter current plan (see comments above)  [] Plan of care initiated [] Hold pending MD visit [] Discharge    Electronically signed by:  Carroll Leonard JENNIFER Kinsey    Note: If patient does not return for scheduled/ recommended follow up visits, this note will serve as a discharge from care along with most recent update on progress.

## 2022-03-25 ENCOUNTER — NURSE ONLY (OUTPATIENT)
Dept: ENT CLINIC | Age: 27
End: 2022-03-25
Payer: COMMERCIAL

## 2022-03-25 DIAGNOSIS — J30.9 ALLERGIC RHINITIS, UNSPECIFIED SEASONALITY, UNSPECIFIED TRIGGER: ICD-10-CM

## 2022-03-25 PROCEDURE — 95165 ANTIGEN THERAPY SERVICES: CPT | Performed by: OTOLARYNGOLOGY

## 2022-03-25 NOTE — PROGRESS NOTES
Total vials prepared: 1 containing 10 doses each vial. Vial contains Pollens. Allergy extract was prepared according to written prescription by provider. Provider onsite during allergy extract preparation. Patient vials labeled with name, date of birth, vial number, Pollen or M M Epi I, and expiration date. Injections are given weekly according to provider orders and injections are built according to patient tolerance to achieve a goal of maintenance. See media scan for Prescription Treatment Set. See allergy flowsheets for injection documentation from each visit.

## 2022-03-28 ENCOUNTER — NURSE ONLY (OUTPATIENT)
Dept: ENT CLINIC | Age: 27
End: 2022-03-28
Payer: COMMERCIAL

## 2022-03-28 DIAGNOSIS — J30.9 ALLERGIC RHINITIS, UNSPECIFIED SEASONALITY, UNSPECIFIED TRIGGER: ICD-10-CM

## 2022-03-28 PROCEDURE — 95117 IMMUNOTHERAPY INJECTIONS: CPT | Performed by: OTOLARYNGOLOGY

## 2022-03-28 NOTE — PROGRESS NOTES
Large local reaction on right arm 20mm. Will repeat dose from last week. Correct serum vials verified by patient and nurse. Consent obtained and SVT completed. SVT P=8mm wheal.  After obtaining consent, and per orders of Dr Tj Fleming, injections of allergy serum given by MADHAV Faria RN. Patient instructed to remain in clinic for 30 minutes after injection, and to report any adverse reactions to me immediately. No change in patient status post injection.

## 2022-03-30 ENCOUNTER — HOSPITAL ENCOUNTER (OUTPATIENT)
Dept: PHYSICAL THERAPY | Age: 27
Setting detail: THERAPIES SERIES
Discharge: HOME | End: 2022-03-30
Payer: COMMERCIAL

## 2022-03-30 PROCEDURE — 97110 THERAPEUTIC EXERCISES: CPT

## 2022-03-30 PROCEDURE — 97140 MANUAL THERAPY 1/> REGIONS: CPT

## 2022-03-30 PROCEDURE — 20560 NDL INSJ W/O NJX 1 OR 2 MUSC: CPT

## 2022-03-30 NOTE — FLOWSHEET NOTE
[facility] [place] [address]      Physical Therapy Treatment Note/ Progress Report:     Date:  3/30/2022    Patient Name:  Connor Lopez    :  1995  MRN: [id number]  Restrictions/Precautions:    Medical/Treatment Diagnosis Information:  · Diagnosis: M25.512 L shoulder pain  · Treatment Diagnosis: M25.512 L shoulder pain  Insurance/Certification information:   [facility]  Physician Information:  Referring Practitioner: Dr. Sayra Hebert  Has the plan of care been signed (Y/N):        []  Yes  [x]  No     Date of Patient follow up with Physician: not scheduled    Is this a Progress Report:     []  Yes  [x]  No     If Yes:  Date Range for reporting period:  Beginning:  ------------ Ending:     Progress report will be due (10 Rx or 30 days whichever is less):      Recertification will be due (POC Duration  / 90 days whichever is less): 22      Visit # Insurance Allowable Auth Required   In Person 3 BMN  [x]  Yes     []  No    Tele Health   []  Yes     []  No    Total 3       Functional Scale: UEFI: NV    Date assessed:  NV      Latex Allergy:  [x]NO      []YES  Preferred Language for Healthcare:   [x]English       []other:    Pain level:  4/10 head, 4/10 neck, 2/10     SUBJECTIVE:  Pt reports ? pain after PT for a few hours that did calm down. However pt started a new medication prescribed by psychiatrist that has made her feel sick, sad and not feeling well. She has a psychologist who is going to communicate with psychiatrist about how to move forward. Pt had pole exercise class that she feels helped her shoulder. Pt currently has a HA (not migraine status per pt) behind her eyes, L sided neck pain and L sided scap pain.     OBJECTIVE:    Observation:    Test measurements:      RESTRICTIONS/PRECAUTIONS: none    Exercises/Interventions:   Therapeutic Ex (60009) Sets/sec Reps Notes/CUES HEP   Row 2 10 Black TB, Tried ext (? pain) x   IR/ER 1 10 GTB x   pec S with self care, reaching, carrying, lifting, house/yardwork, driving/computer work      Manual Treatments:  PROM / STM / Oscillations-Mobs:  G-I, II, III, IV (PA's, Inf., Post.)  [x] (30921) Provided manual therapy to mobilize soft tissue/joints of cervical/CT, scapular GHJ and UE for the purpose of modulating pain, promoting relaxation,  increasing ROM, reducing/eliminating soft tissue swelling/inflammation/restriction, improving soft tissue extensibility and allowing for proper ROM for normal function with self care, reaching, carrying, lifting, house/yardwork, driving/computer work    Modalities:   tried e-stim this visit, caused different sensations than expected so treatment was stopped after approx 2-3 min   [] GAME READY (VASO)- for significant edema, swelling, pain control. Charges:  Timed Code Treatment Minutes: 40   Total Treatment Minutes:  50      [] EVAL (LOW) 86176 (typically 20 minutes face-to-face)  [] EVAL (MOD) 01285 (typically 30 minutes face-to-face)  [] EVAL (HIGH) 45196 (typically 45 minutes face-to-face)  [] RE-EVAL     [x] GR(98216) x 2    [] IONTO  [] NMR (07402) x     [] VASO  [x] Manual (99017) x  1   [] Other:  [] TA x      [] Mech Traction (46147)  [] ES(attended) (66953)      [] ES (un) (29972):    ASSESSMENT:  Pt exhibits significant tightness in kj scap musculature that is directly contributing to HA and neck pain and will benefit from continued ? myofascial tightness as well as improved posture strength and stability. Will reach out to MD re: possible addition of DN treatment      GOALS:     Patient stated goal: relieve some of the pain, be able to continue my new workout     Therapist goals for Patient:   Short Term Goals: To be achieved in: 2 weeks  1. Independent in HEP and progression per patient tolerance, in order to prevent re-injury. []? Progressing: []? Met: []? Not Met: []? Adjusted      2.  Patient will have a decrease in pain to facilitate improvement in movement, function, and ADLs as indicated by Functional Deficits. []? Progressing: []? Met: []? Not Met: []? Adjusted      Long Term Goals: To be achieved in: 12 weeks  1. Disability index score of 10% or less for the DASH to assist with reaching prior level of function. []? Progressing: []? Met: []? Not Met: []? Adjusted      2. Patient will demonstrate increased AROM to WNL to allow for proper joint functioning as indicated by patients Functional Deficits. []? Progressing: []? Met: []? Not Met: []? Adjusted      3. Patient will demonstrate an increase in Strength to 5/5 to allow for proper functional mobility as indicated by patients Functional Deficits. []? Progressing: []? Met: []? Not Met: []? Adjusted      4. Patient will return to all functional activities without increased symptoms or restriction. []? Progressing: []? Met: []? Not Met: []? Adjusted      5. Pt will exhibit self(patient specific functional goal)    []? Progressing: []? Met: []? Not Met: []? Adjusted           Access Code: [email or web address]  URL: [email or web address]  Date: 03/14/2022  Prepared by: Frank Allen    Exercises  Standing Pec Stretch at [address] - 1 x daily - 7 x weekly - 1 sets - 3 reps - 20 sec hold  Standing Bilateral Low Shoulder Row with Anchored Resistance - 1 x daily - 7 x weekly - 3 sets - 10 reps  Standing Shoulder Internal Rotation with Anchored Resistance - 1 x daily - 7 x weekly - 3 sets - 10 reps  Shoulder External Rotation with Anchored Resistance - 1 x daily - 7 x weekly - 3 sets - 10 reps      Overall Progression Towards Functional goals/ Treatment Progress Update:  [] Patient is progressing as expected towards functional goals listed. [] Progression is slowed due to complexities/Impairments listed. [] Progression has been slowed due to co-morbidities.   [x] Plan just implemented, too soon to assess goals progression <30days   [] Goals require adjustment due to lack of progress  [] Patient is not progressing as

## 2022-04-04 ENCOUNTER — NURSE ONLY (OUTPATIENT)
Dept: ENT CLINIC | Age: 27
End: 2022-04-04
Payer: COMMERCIAL

## 2022-04-04 DIAGNOSIS — J30.9 ALLERGIC RHINITIS, UNSPECIFIED SEASONALITY, UNSPECIFIED TRIGGER: ICD-10-CM

## 2022-04-04 PROCEDURE — 95117 IMMUNOTHERAPY INJECTIONS: CPT | Performed by: OTOLARYNGOLOGY

## 2022-04-04 NOTE — PROGRESS NOTES
Large local reaction on right arm last week >30mm. No other symptoms. Correct serum vials verified by patient and nurse. After obtaining consent, and per orders of Dr Harry Brady, injections of allergy serum given by MADHAV Faria RN. Patient instructed to remain in clinic for 30 minutes after injection, and to report any adverse reactions to me immediately. No change in patient status post injection.

## 2022-04-06 ENCOUNTER — HOSPITAL ENCOUNTER (OUTPATIENT)
Dept: PHYSICAL THERAPY | Age: 27
Setting detail: THERAPIES SERIES
Discharge: HOME | End: 2022-04-06
Payer: COMMERCIAL

## 2022-04-06 PROCEDURE — 97110 THERAPEUTIC EXERCISES: CPT

## 2022-04-06 PROCEDURE — 97140 MANUAL THERAPY 1/> REGIONS: CPT

## 2022-04-06 NOTE — FLOWSHEET NOTE
[facility]      Physical Therapy Treatment Note/ Progress Report:     Date:  2022    Patient Name:  Israel Weber    :  1995  MRN: [id number]  Restrictions/Precautions:    Medical/Treatment Diagnosis Information:  · Diagnosis: M25.512 L shoulder pain  · Treatment Diagnosis: M25.512 L shoulder pain  Insurance/Certification information:   [facility]  Physician Information:  Referring Practitioner: Dr. Philip Mello  Has the plan of care been signed (Y/N):        []  Yes  [x]  No     Date of Patient follow up with Physician: not scheduled    Is this a Progress Report:     []  Yes  [x]  No     If Yes:  Date Range for reporting period:  Beginning:  ------------ Ending:     Progress report will be due (10 Rx or 30 days whichever is less): 3/02/43     Recertification will be due (POC Duration  / 90 days whichever is less): 22      Visit # Insurance Allowable Auth Required   In Person 4 12 visits (3/22 - ) [x]  Yes     []  No    Tele Health   []  Yes     []  No    Total 4       Functional Scale: UEFI: NV    Date assessed:  NV      Latex Allergy:  [x]NO      []YES  Preferred Language for Healthcare:   [x]English       []other:    Pain level:  4/10 head (behind L ear, feels like earache sometimes), 4/10 neck, 3/10 shoulder     SUBJECTIVE:  Pt reports her migraines continued for a few days after last visit but has felt ok the last few days. Pt notes she felt ok after TENS last visit. Pt notes she stopped taking medication that she felt was causing side effects. Pt is seeing a new psychiatrist today.    Note: pt contacted insurance and DN is covered and no pre-auth is required    OBJECTIVE:    Observation:    Test measurements:      RESTRICTIONS/PRECAUTIONS: none    Exercises/Interventions:   Therapeutic Ex (53254) Sets/sec Reps Notes/CUES HEP   Row 2 10 Black TB, Tried ext (? pain) x   IR/ER 2 10 GTB x   pec S at wall 20\" 3  x   Open book 1, hold 5 sec 10  x   horiz ABD 3 10 RTB x   Chin tuck 3\" 10  x   LS S 10\" 5 To R only x                                      Manual Intervention (88131)        x3      SOR,  x3'      SL scap mobs, STM kj scap (LS) x6'  Radiating pain into head and neck with pressure to LS    STM upper trap, cervical paraspinals x8'                    NMR re-education (49851)   CUES NEEDED                                                                   Therapeutic Activity (05943)                                          [employer] access code:    [email or web address]           Therapeutic Exercise and NMR EXR  [x] (56009) Provided verbal/tactile cueing for activities related to strengthening, flexibility, endurance, ROM  for improvements in scapular, scapulothoracic and UE control with self care, reaching, carrying, lifting, house/yardwork, driving/computer work.    [] (77070) Provided verbal/tactile cueing for activities related to improving balance, coordination, kinesthetic sense, posture, motor skill, proprioception  to assist with  scapular, scapulothoracic and UE control with self care, reaching, carrying, lifting, house/yardwork, driving/computer work. Therapeutic Activities:    [] (34057 or 16209) Provided verbal/tactile cueing for activities related to improving balance, coordination, kinesthetic sense, posture, motor skill, proprioception and motor activation to allow for proper function of scapular, scapulothoracic and UE control with self care, carrying, lifting, driving/computer work.      Home Exercise Program:    [x] (86019) Reviewed/Progressed HEP activities related to strengthening, flexibility, endurance, ROM of scapular, scapulothoracic and UE control with self care, reaching, carrying, lifting, house/yardwork, driving/computer work  [] (40988) Reviewed/Progressed HEP activities related to improving balance, coordination, kinesthetic sense, posture, motor skill, proprioception of scapular, scapulothoracic and UE control with self care, reaching, carrying, lifting, house/yardwork, driving/computer work      Manual Treatments:  PROM / STM / Oscillations-Mobs:  G-I, II, III, IV (PA's, Inf., Post.)  [x] (91753) Provided manual therapy to mobilize soft tissue/joints of cervical/CT, scapular GHJ and UE for the purpose of modulating pain, promoting relaxation,  increasing ROM, reducing/eliminating soft tissue swelling/inflammation/restriction, improving soft tissue extensibility and allowing for proper ROM for normal function with self care, reaching, carrying, lifting, house/yardwork, driving/computer work    Modalities:  Electrical Stimulation for pain control, swelling reduction. Waveform: premod; Cycle Time: Continuous; Frequency: 100 pps; Polarity: Negative; Ramp: 2 sec; Amplitude: 7.4 Volts Treatment time: 10 min. [] GAME READY (VASO)- for significant edema, swelling, pain control. Charges:  Timed Code Treatment Minutes: 40   Total Treatment Minutes:  50      [] EVAL (LOW) 94915 (typically 20 minutes face-to-face)  [] EVAL (MOD) 58963 (typically 30 minutes face-to-face)  [] EVAL (HIGH) 32235 (typically 45 minutes face-to-face)  [] RE-EVAL     [x] YM(88430) x 2    [] IONTO  [] NMR (39836) x     [] VASO  [x] Manual (09586) x  1   [] Other:  [] TA x      [] Mech Traction (72711)  [] ES(attended) (17999)      [] ES (un) (19745):    ASSESSMENT:  Pt exhibits significant tightness in kj scap musculature that is directly contributing to HA and neck pain and will benefit from continued ? myofascial tightness as well as improved posture strength and stability. Will reach out to MD re: possible addition of DN treatment      GOALS:     Patient stated goal: relieve some of the pain, be able to continue my new workout     Therapist goals for Patient:   Short Term Goals: To be achieved in: 2 weeks  1. Independent in HEP and progression per patient tolerance, in order to prevent re-injury. []? Progressing: []? Met: []? Not Met: []?  Adjusted    2. Patient will have a decrease in pain to facilitate improvement in movement, function, and ADLs as indicated by Functional Deficits. []? Progressing: []? Met: []? Not Met: []? Adjusted      Long Term Goals: To be achieved in: 12 weeks  1. Disability index score of 10% or less for the DASH to assist with reaching prior level of function. []? Progressing: []? Met: []? Not Met: []? Adjusted      2. Patient will demonstrate increased AROM to WNL to allow for proper joint functioning as indicated by patients Functional Deficits. []? Progressing: []? Met: []? Not Met: []? Adjusted      3. Patient will demonstrate an increase in Strength to 5/5 to allow for proper functional mobility as indicated by patients Functional Deficits. []? Progressing: []? Met: []? Not Met: []? Adjusted      4. Patient will return to all functional activities without increased symptoms or restriction. []? Progressing: []? Met: []? Not Met: []? Adjusted      5. Pt will exhibit self(patient specific functional goal)    []? Progressing: []? Met: []? Not Met: []? Adjusted           Access Code: [email or web address]  URL: [email or web address]  Date: 03/14/2022  Prepared by: Netite Reza    Exercises  Standing Pec Stretch at [place] - 1 x daily - 7 x weekly - 1 sets - 3 reps - 20 sec hold  Standing Bilateral Low Shoulder Row with Anchored Resistance - 1 x daily - 7 x weekly - 3 sets - 10 reps  Standing Shoulder Internal Rotation with Anchored Resistance - 1 x daily - 7 x weekly - 3 sets - 10 reps  Shoulder External Rotation with Anchored Resistance - 1 x daily - 7 x weekly - 3 sets - 10 reps      Overall Progression Towards Functional goals/ Treatment Progress Update:  [] Patient is progressing as expected towards functional goals listed. [] Progression is slowed due to complexities/Impairments listed. [] Progression has been slowed due to co-morbidities.   [x] Plan just implemented, too soon to assess goals progression <30days   [] Goals require adjustment due to lack of progress  [] Patient is not progressing as expected and requires additional follow up with physician  [] Other    Prognosis for POC: [x] Good [] Fair  [] Poor      Patient requires continued skilled intervention: [x] Yes  [] No    Treatment/Activity Tolerance:  [x] Patient able to complete treatment  [] Patient limited by fatigue  [] Patient limited by pain    [] Patient limited by other medical complications  [] Other:       PLAN: See eval  [x] Continue per plan of care [] Alter current plan (see comments above)  [] Plan of care initiated [] Hold pending MD visit [] Discharge    Electronically signed by:  Fred Holley PT    Note: If patient does not return for scheduled/ recommended follow up visits, this note will serve as a discharge from care along with most recent update on progress.

## 2022-04-11 ENCOUNTER — NURSE ONLY (OUTPATIENT)
Dept: ENT CLINIC | Age: 27
End: 2022-04-11
Payer: COMMERCIAL

## 2022-04-11 DIAGNOSIS — J30.9 ALLERGIC RHINITIS, UNSPECIFIED SEASONALITY, UNSPECIFIED TRIGGER: ICD-10-CM

## 2022-04-11 PROCEDURE — 95117 IMMUNOTHERAPY INJECTIONS: CPT | Performed by: OTOLARYNGOLOGY

## 2022-04-11 NOTE — PROGRESS NOTES
Local reaction to right arm 20mm. Dose repeated from last week. Correct serum vials verified by patient and nurse. After obtaining consent, and per orders of Dr Malick Morton, injections of allergy serum given by MADHAV Faria RN. Patient instructed to remain in clinic for 30 minutes after injection, and to report any adverse reactions to me immediately. No change in patient status post injection.

## 2022-04-13 ENCOUNTER — HOSPITAL ENCOUNTER (OUTPATIENT)
Dept: PHYSICAL THERAPY | Age: 27
Setting detail: THERAPIES SERIES
Discharge: HOME | End: 2022-04-13
Payer: COMMERCIAL

## 2022-04-13 PROCEDURE — G0283 ELEC STIM OTHER THAN WOUND: HCPCS

## 2022-04-13 PROCEDURE — 97140 MANUAL THERAPY 1/> REGIONS: CPT

## 2022-04-13 PROCEDURE — 97110 THERAPEUTIC EXERCISES: CPT

## 2022-04-13 NOTE — FLOWSHEET NOTE
[facility]      Physical Therapy Treatment Note/ Progress Report:     Date:  2022    Patient Name:  Jenean Cowden    :  1995  MRN: [id number]  Restrictions/Precautions:    Medical/Treatment Diagnosis Information:  · Diagnosis: M25.512 L shoulder pain  · Treatment Diagnosis: M25.512 L shoulder pain  Insurance/Certification information:   [facility]  Physician Information:  Referring Practitioner: Dr. Gary Brantley  Has the plan of care been signed (Y/N):        []  Yes  [x]  No     Date of Patient follow up with Physician: not scheduled    Is this a Progress Report:     []  Yes  [x]  No     If Yes:  Date Range for reporting period:  Beginning:  ------------ Ending:     Progress report will be due (10 Rx or 30 days whichever is less): 3/41/61     Recertification will be due (POC Duration  / 90 days whichever is less): 22      Visit # Insurance Allowable Auth Required   In Person 5 12 visits (3/22 - ) [x]  Yes     []  No    Tele Health   []  Yes     []  No    Total 5       Functional Scale: UEFI: NV    Date assessed:  NV      Latex Allergy:  [x]NO      []YES  Preferred Language for Healthcare:   [x]English       []other:    Pain level:  310 neck, 310 shoulder    SUBJECTIVE:  Pt states she feels great noting the psychiatrist ended up prescribing ritalin for ADHD and the change has been drastic and immediate. Pt feels so happy that she has seen this wonderful change mentally. Pt denies migraines since starting this medication.  Pt reports feeling tingling at times     Note: pt contacted insurance and DN is covered and no pre-auth is required    OBJECTIVE:    Observation:    Test measurements:      RESTRICTIONS/PRECAUTIONS: none    Exercises/Interventions:   Therapeutic Ex (59425) Sets/sec Reps Notes/CUES HEP   Row 2 10 Black TB, Tried ext (? pain) x     No money 2 10 GTB x   pec S at wall 20\" 3  x   Open book 1, hold 5 sec 10  x   horiz ABD 3 10 RTB x   Chin tuck 3\" 10  x   LS S 10\" 5 To R only x                               Pt edu: find umbrella with curved handle (for self STM in levator)       Manual Intervention (86350)        x3      SOR,  x3'      SL scap mobs, STM kj scap (LS) x6'  Radiating pain into head and neck, tingling into hand with pressure to LS, rhomboid/mid trap    STM upper trap, cervical paraspinals x8'      Hawks  NV  sitting           NMR re-education (95940)   CUES NEEDED                                                                   Therapeutic Activity (19978)                                          [email or web address] access code:    [email or web address]           Therapeutic Exercise and NMR EXR  [x] (39409) Provided verbal/tactile cueing for activities related to strengthening, flexibility, endurance, ROM  for improvements in scapular, scapulothoracic and UE control with self care, reaching, carrying, lifting, house/yardwork, driving/computer work.    [] (31638) Provided verbal/tactile cueing for activities related to improving balance, coordination, kinesthetic sense, posture, motor skill, proprioception  to assist with  scapular, scapulothoracic and UE control with self care, reaching, carrying, lifting, house/yardwork, driving/computer work. Therapeutic Activities:    [] (72983 or 02152) Provided verbal/tactile cueing for activities related to improving balance, coordination, kinesthetic sense, posture, motor skill, proprioception and motor activation to allow for proper function of scapular, scapulothoracic and UE control with self care, carrying, lifting, driving/computer work.      Home Exercise Program:    [x] (70894) Reviewed/Progressed HEP activities related to strengthening, flexibility, endurance, ROM of scapular, scapulothoracic and UE control with self care, reaching, carrying, lifting, house/yardwork, driving/computer work  [] (40680) Reviewed/Progressed HEP activities related to improving balance, coordination, kinesthetic sense, posture, motor skill, proprioception of scapular, scapulothoracic and UE control with self care, reaching, carrying, lifting, house/yardwork, driving/computer work      Manual Treatments:  PROM / STM / Oscillations-Mobs:  G-I, II, III, IV (PA's, Inf., Post.)  [x] (42486) Provided manual therapy to mobilize soft tissue/joints of cervical/CT, scapular GHJ and UE for the purpose of modulating pain, promoting relaxation,  increasing ROM, reducing/eliminating soft tissue swelling/inflammation/restriction, improving soft tissue extensibility and allowing for proper ROM for normal function with self care, reaching, carrying, lifting, house/yardwork, driving/computer work    Modalities:  Electrical Stimulation for pain control, swelling reduction. Waveform: premod; Cycle Time: Continuous; Frequency: 100 pps; Polarity: Negative; Ramp: 2 sec; Amplitude: 7.4 Volts Treatment time: 10 min. [] GAME READY (VASO)- for significant edema, swelling, pain control. Charges:  Timed Code Treatment Minutes: 40   Total Treatment Minutes:  50      [] EVAL (LOW) 04630 (typically 20 minutes face-to-face)  [] EVAL (MOD) 50264 (typically 30 minutes face-to-face)  [] EVAL (HIGH) 96797 (typically 45 minutes face-to-face)  [] RE-EVAL     [x] JI(18461) x 2    [] IONTO  [] NMR (75162) x     [] VASO  [x] Manual (72326) x  1   [] Other:  [] TA x      [] Mech Traction (28851)  [] ES(attended) (65597)      [] ES (un) (11202):    ASSESSMENT:  Pt continues to exhibit tightness in L kj scap musculature and cervical paraspinals contributing to pain and symptoms. Pt reports reproduction of symptoms with manual pressure in musculature indicating myofascial tightness is contributing to pain and impairment. Pt has improved with medication changes related to mental health dx, notably recently being prescribed Ritalin for ADHS that has helped significantly.      GOALS:     Patient stated goal: relieve some of the pain, be able to continue my new workout     Therapist goals for Patient:   Short Term Goals: To be achieved in: 2 weeks  1. Independent in HEP and progression per patient tolerance, in order to prevent re-injury. []? Progressing: []? Met: []? Not Met: []? Adjusted      2. Patient will have a decrease in pain to facilitate improvement in movement, function, and ADLs as indicated by Functional Deficits. []? Progressing: []? Met: []? Not Met: []? Adjusted      Long Term Goals: To be achieved in: 12 weeks  1. Disability index score of 10% or less for the DASH to assist with reaching prior level of function. []? Progressing: []? Met: []? Not Met: []? Adjusted      2. Patient will demonstrate increased AROM to WNL to allow for proper joint functioning as indicated by patients Functional Deficits. []? Progressing: []? Met: []? Not Met: []? Adjusted      3. Patient will demonstrate an increase in Strength to 5/5 to allow for proper functional mobility as indicated by patients Functional Deficits. []? Progressing: []? Met: []? Not Met: []? Adjusted      4. Patient will return to all functional activities without increased symptoms or restriction. []? Progressing: []? Met: []? Not Met: []? Adjusted      5. Pt will exhibit self(patient specific functional goal)    []? Progressing: []? Met: []? Not Met: []? Adjusted           Access Code: [email or web address]  URL: [email or web address]  Date: 03/14/2022  Prepared by: Chava Ye    Exercises  Standing Pec Stretch at [place] - 1 x daily - 7 x weekly - 1 sets - 3 reps - 20 sec hold  Standing Bilateral Low Shoulder Row with Anchored Resistance - 1 x daily - 7 x weekly - 3 sets - 10 reps  Standing Shoulder Internal Rotation with Anchored Resistance - 1 x daily - 7 x weekly - 3 sets - 10 reps  Shoulder External Rotation with Anchored Resistance - 1 x daily - 7 x weekly - 3 sets - 10 reps      Overall Progression Towards Functional goals/ Treatment Progress Update:  [] Patient is progressing

## 2022-04-18 ENCOUNTER — NURSE ONLY (OUTPATIENT)
Dept: ENT CLINIC | Age: 27
End: 2022-04-18
Payer: COMMERCIAL

## 2022-04-18 DIAGNOSIS — J30.9 ALLERGIC RHINITIS, UNSPECIFIED SEASONALITY, UNSPECIFIED TRIGGER: ICD-10-CM

## 2022-04-18 PROCEDURE — 95117 IMMUNOTHERAPY INJECTIONS: CPT | Performed by: OTOLARYNGOLOGY

## 2022-04-18 NOTE — PROGRESS NOTES
Pt had 20mm local reaction on Right arm last week after injection, but also cleaned basement this week and was itching after. Will decrease M,M,Epi,I dose today. Correct serum vials verified by patient and nurse. After obtaining consent, and per orders of Dr Tammie Garrido, injections of allergy serum given by MADHAV Faria RN. Patient instructed to remain in clinic for 30 minutes after injection, and to report any adverse reactions to me immediately. No change in patient status post injection.

## 2022-04-20 ENCOUNTER — HOSPITAL ENCOUNTER (OUTPATIENT)
Dept: PHYSICAL THERAPY | Age: 27
Setting detail: THERAPIES SERIES
Discharge: HOME | End: 2022-04-20
Payer: COMMERCIAL

## 2022-04-20 PROCEDURE — 97110 THERAPEUTIC EXERCISES: CPT

## 2022-04-20 PROCEDURE — 97140 MANUAL THERAPY 1/> REGIONS: CPT

## 2022-04-20 NOTE — FLOWSHEET NOTE
[facility]      Physical Therapy Treatment Note/ Progress Report:     Date:  2022    Patient Name:  Catherine Ramírez    :  1995  MRN: [id number]  Restrictions/Precautions:    Medical/Treatment Diagnosis Information:  · Diagnosis: M25.512 L shoulder pain  · Treatment Diagnosis: M25.512 L shoulder pain  Insurance/Certification information:   [facility]  Physician Information:  Referring Practitioner: Dr. Caleb Das  Has the plan of care been signed (Y/N):        []  Yes  [x]  No     Date of Patient follow up with Physician: not scheduled    Is this a Progress Report:     []  Yes  [x]  No     If Yes:  Date Range for reporting period:  Beginning:  ------------ Ending:     Progress report will be due (10 Rx or 30 days whichever is less):      Recertification will be due (POC Duration  / 90 days whichever is less): 22      Visit # Insurance Allowable Auth Required   In Person 6 12 visits (3/22 - ) [x]  Yes     []  No    Tele Health   []  Yes     []  No    Total 6       Functional Scale: UEFI: NV    Date assessed:  NV      Latex Allergy:  [x]NO      []YES  Preferred Language for Healthcare:   [x]English       []other:    Pain level:  8/10 neck, 8/10 shoulder    SUBJECTIVE:  Pt reports she has a high level of pain today with a HA. Pt took her medication this morning so the head portion of pain is subsiding however feels a lot of pain in L side shoulder blade region.      Note: pt contacted insurance and DN is covered and no pre-auth is required    OBJECTIVE:    Observation:    Test measurements:      RESTRICTIONS/PRECAUTIONS: none    Exercises/Interventions:   Therapeutic Ex (68869) Sets/sec Reps Notes/CUES HEP   Row 2 10 Black TB, Tried ext (? pain) x     No money 2 10 GTB x   pec S at wall 20\" 3  x   Open book 1, hold 5 sec 10  x   horiz ABD 3 10 RTB x   Chin tuck 3\" 10  x   LS S 10\" 5 To R only x Manual Intervention (84664)        x3      SOR,  x3'      SL scap mobs, STM kj scap (LS) x8'  Radiating pain into head and neck, tingling into hand with pressure to LS, rhomboid/mid trap    STM upper trap, cervical paraspinals x8'      [place]ks  x7'  sitting           NMR re-education (76013)   CUES NEEDED                                                                   Therapeutic Activity (40671)                                          [employer] access code:    [email or web address]           Therapeutic Exercise and NMR EXR  [x] (31465) Provided verbal/tactile cueing for activities related to strengthening, flexibility, endurance, ROM  for improvements in scapular, scapulothoracic and UE control with self care, reaching, carrying, lifting, house/yardwork, driving/computer work.    [] (44160) Provided verbal/tactile cueing for activities related to improving balance, coordination, kinesthetic sense, posture, motor skill, proprioception  to assist with  scapular, scapulothoracic and UE control with self care, reaching, carrying, lifting, house/yardwork, driving/computer work. Therapeutic Activities:    [] (63439 or 46239) Provided verbal/tactile cueing for activities related to improving balance, coordination, kinesthetic sense, posture, motor skill, proprioception and motor activation to allow for proper function of scapular, scapulothoracic and UE control with self care, carrying, lifting, driving/computer work.      Home Exercise Program:    [x] (75212) Reviewed/Progressed HEP activities related to strengthening, flexibility, endurance, ROM of scapular, scapulothoracic and UE control with self care, reaching, carrying, lifting, house/yardwork, driving/computer work  [] (98559) Reviewed/Progressed HEP activities related to improving balance, coordination, kinesthetic sense, posture, motor skill, proprioception of scapular, scapulothoracic and UE control with self care, reaching, carrying, lifting, house/yardwork, driving/computer work      Manual Treatments:  PROM / STM / Oscillations-Mobs:  G-I, II, III, IV (PA's, Inf., Post.)  [x] (13332) Provided manual therapy to mobilize soft tissue/joints of cervical/CT, scapular GHJ and UE for the purpose of modulating pain, promoting relaxation,  increasing ROM, reducing/eliminating soft tissue swelling/inflammation/restriction, improving soft tissue extensibility and allowing for proper ROM for normal function with self care, reaching, carrying, lifting, house/yardwork, driving/computer work    Modalities:     [] GAME READY (VASO)- for significant edema, swelling, pain control. Charges:  Timed Code Treatment Minutes: 40   Total Treatment Minutes:  50      [] EVAL (LOW) 04654 (typically 20 minutes face-to-face)  [] EVAL (MOD) 66590 (typically 30 minutes face-to-face)  [] EVAL (HIGH) 10060 (typically 45 minutes face-to-face)  [] RE-EVAL     [x] EI(20790) x 1    [] IONTO  [] NMR (77221) x     [] VASO  [x] Manual (82259) x  2   [] Other:  [] TA x      [] Mech Traction (94373)  [] ES(attended) (89136)      [] ES (un) (29798):    ASSESSMENT:  Pt continues to exhibit tightness in L kj scap musculature and cervical paraspinals contributing to pain and symptoms. Pt reports reproduction of symptoms with manual pressure in musculature indicating myofascial tightness is contributing to pain and impairment. Pt has improved with medication changes related to mental health dx, notably recently being prescribed Ritalin for ADHS that has helped significantly. GOALS:     Patient stated goal: relieve some of the pain, be able to continue my new workout     Therapist goals for Patient:   Short Term Goals: To be achieved in: 2 weeks  1. Independent in HEP and progression per patient tolerance, in order to prevent re-injury. []? Progressing: []? Met: []? Not Met: []? Adjusted      2.  Patient will have a decrease in pain to facilitate improvement in movement, function, and ADLs as indicated by Functional Deficits. []? Progressing: []? Met: []? Not Met: []? Adjusted      Long Term Goals: To be achieved in: 12 weeks  1. Disability index score of 10% or less for the DASH to assist with reaching prior level of function. []? Progressing: []? Met: []? Not Met: []? Adjusted      2. Patient will demonstrate increased AROM to WNL to allow for proper joint functioning as indicated by patients Functional Deficits. []? Progressing: []? Met: []? Not Met: []? Adjusted      3. Patient will demonstrate an increase in Strength to 5/5 to allow for proper functional mobility as indicated by patients Functional Deficits. []? Progressing: []? Met: []? Not Met: []? Adjusted      4. Patient will return to all functional activities without increased symptoms or restriction. []? Progressing: []? Met: []? Not Met: []? Adjusted      5. Pt will exhibit self(patient specific functional goal)    []? Progressing: []? Met: []? Not Met: []? Adjusted           Access Code: [email or web address]  URL: [email or web address]  Date: 03/14/2022  Prepared by: Maverick Limbo    Exercises  Standing Pec Stretch at [place] - 1 x daily - 7 x weekly - 1 sets - 3 reps - 20 sec hold  Standing Bilateral Low Shoulder Row with Anchored Resistance - 1 x daily - 7 x weekly - 3 sets - 10 reps  Standing Shoulder Internal Rotation with Anchored Resistance - 1 x daily - 7 x weekly - 3 sets - 10 reps  Shoulder External Rotation with Anchored Resistance - 1 x daily - 7 x weekly - 3 sets - 10 reps      Overall Progression Towards Functional goals/ Treatment Progress Update:  [] Patient is progressing as expected towards functional goals listed. [] Progression is slowed due to complexities/Impairments listed. [] Progression has been slowed due to co-morbidities.   [x] Plan just implemented, too soon to assess goals progression <30days   [] Goals require adjustment due to lack of progress  [] Patient is not progressing as expected and requires additional follow up with physician  [] Other    Prognosis for POC: [x] Good [] Fair  [] Poor      Patient requires continued skilled intervention: [x] Yes  [] No    Treatment/Activity Tolerance:  [x] Patient able to complete treatment  [] Patient limited by fatigue  [] Patient limited by pain    [] Patient limited by other medical complications  [] Other:       PLAN: See eval  [x] Continue per plan of care [] Alter current plan (see comments above)  [] Plan of care initiated [] Hold pending MD visit [] Discharge    Electronically signed by:  Karishma Rivera PT    Note: If patient does not return for scheduled/ recommended follow up visits, this note will serve as a discharge from care along with most recent update on progress.

## 2022-04-25 ENCOUNTER — NURSE ONLY (OUTPATIENT)
Dept: ENT CLINIC | Age: 27
End: 2022-04-25
Payer: COMMERCIAL

## 2022-04-25 DIAGNOSIS — J30.9 ALLERGIC RHINITIS, UNSPECIFIED SEASONALITY, UNSPECIFIED TRIGGER: ICD-10-CM

## 2022-04-25 PROCEDURE — 95117 IMMUNOTHERAPY INJECTIONS: CPT | Performed by: OTOLARYNGOLOGY

## 2022-04-26 RX ORDER — CELECOXIB 200 MG/1
CAPSULE ORAL
Qty: 30 CAPSULE | Refills: 1 | Status: SHIPPED | OUTPATIENT
Start: 2022-04-26 | End: 2022-06-24

## 2022-04-26 NOTE — TELEPHONE ENCOUNTER
Medication:   Requested Prescriptions     Pending Prescriptions Disp Refills    celecoxib (CELEBREX) 200 MG capsule [Pharmacy Med Name: CELECOXIB 200 MG CAPSULE] 30 capsule 1     Sig: TAKE 1 CAPSULE BY MOUTH EVERY DAY     Last Filled: 3.3.22  Last appt: 3/3/2022   Next appt: Visit date not found    Last OARRS: No flowsheet data found.

## 2022-04-27 ENCOUNTER — APPOINTMENT (OUTPATIENT)
Dept: PHYSICAL THERAPY | Age: 27
End: 2022-04-27
Payer: COMMERCIAL

## 2022-05-02 ENCOUNTER — NURSE ONLY (OUTPATIENT)
Dept: ENT CLINIC | Age: 27
End: 2022-05-02
Payer: COMMERCIAL

## 2022-05-02 DIAGNOSIS — J30.9 ALLERGIC RHINITIS, UNSPECIFIED SEASONALITY, UNSPECIFIED TRIGGER: ICD-10-CM

## 2022-05-02 PROCEDURE — 95117 IMMUNOTHERAPY INJECTIONS: CPT | Performed by: OTOLARYNGOLOGY

## 2022-05-02 NOTE — PROGRESS NOTES
Local reaction to right arm (M,M,Epi,I) >30mm lasting 24 hours. No other reaction noted by pt. Will decrease M,M,Epi,I dose this week to 0.25ml and repeat until this vial is completed. Pt agrees with POC. Correct serum vials verified by patient and nurse. After obtaining consent, and per orders of Dr Anel Flores, injections of allergy serum given by MADHAV Faria RN. Patient instructed to remain in clinic for 30 minutes after injection, and to report any adverse reactions to me immediately. No change in patient status post injection.

## 2022-05-04 ENCOUNTER — HOSPITAL ENCOUNTER (OUTPATIENT)
Dept: PHYSICAL THERAPY | Age: 27
Setting detail: THERAPIES SERIES
Discharge: HOME | End: 2022-05-04
Payer: COMMERCIAL

## 2022-05-04 PROCEDURE — 97140 MANUAL THERAPY 1/> REGIONS: CPT

## 2022-05-04 PROCEDURE — 97110 THERAPEUTIC EXERCISES: CPT

## 2022-05-04 NOTE — FLOWSHEET NOTE
The [facility]      Physical Therapy Treatment Note/ Progress Report:     Date:  2022    Patient Name:  Swetha Bass    :  1995  MRN: [id number]  Restrictions/Precautions:    Medical/Treatment Diagnosis Information:  · Diagnosis: M25.512 L shoulder pain  · Treatment Diagnosis: M25.512 L shoulder pain  Insurance/Certification information:   [facility]  Physician Information:  Referring Practitioner: Dr. Jose Garza  Has the plan of care been signed (Y/N):        []  Yes  [x]  No     Date of Patient follow up with Physician: not scheduled    Is this a Progress Report:     []  Yes  [x]  No     If Yes:  Date Range for reporting period:  Beginning:  ------------ Ending:     Progress report will be due (10 Rx or 30 days whichever is less):      Recertification will be due (POC Duration  / 90 days whichever is less): 22      Visit # Insurance Allowable Auth Required   In Person 7 12 visits (3/22 - ) [x]  Yes     []  No    Tele Health   []  Yes     []  No    Total 7       Functional Scale: UEFI: NV    Date assessed:  NV      Latex Allergy:  [x]NO      []YES  Preferred Language for Healthcare:   [x]English       []other:    Pain level:  2-3/10 neck, 2-3/10 shoulder    SUBJECTIVE:  Pt reports less CLARK recently. Pt experiences a dull ache in shoulder that is radiating to numbness in hand. Pt felt good after Hawks  treatment last visit.      Note: pt contacted insurance and DN is covered and no pre-auth is required    OBJECTIVE:    Observation:    Test measurements:      RESTRICTIONS/PRECAUTIONS: none    Exercises/Interventions:   Therapeutic Ex (56805) Sets/sec Reps Notes/CUES HEP   Row 2 10 Black TB, Tried ext (? pain) x   IR/ER  No money 2 10 GTB x   pec S at wall 20\" 3  x   Open book 1, hold 5 sec 10  x   horiz ABD 3 10 RTB x   Chin tuck 3\" 10  x   LS S 10\" 5 To R only x                               Pt edu: find umbrella with curved handle (for self STM in levator)       Manual Intervention (33548)        x3      SOR,  x3'      SL scap mobs, STM kj scap (LS) x8'  Radiating pain into head and neck, tingling into hand with pressure to LS, rhomboid/mid trap    STM upper trap, cervical paraspinals x8'      Hawks  x7'  sitting           NMR re-education (68483)   CUES NEEDED                                                                   Therapeutic Activity (55531)                                          [employer] access code:    [email or web address]           Therapeutic Exercise and NMR EXR  [x] (65333) Provided verbal/tactile cueing for activities related to strengthening, flexibility, endurance, ROM  for improvements in scapular, scapulothoracic and UE control with self care, reaching, carrying, lifting, house/yardwork, driving/computer work.    [] (77940) Provided verbal/tactile cueing for activities related to improving balance, coordination, kinesthetic sense, posture, motor skill, proprioception  to assist with  scapular, scapulothoracic and UE control with self care, reaching, carrying, lifting, house/yardwork, driving/computer work. Therapeutic Activities:    [] (84966 or 07037) Provided verbal/tactile cueing for activities related to improving balance, coordination, kinesthetic sense, posture, motor skill, proprioception and motor activation to allow for proper function of scapular, scapulothoracic and UE control with self care, carrying, lifting, driving/computer work.      Home Exercise Program:    [x] (07705) Reviewed/Progressed HEP activities related to strengthening, flexibility, endurance, ROM of scapular, scapulothoracic and UE control with self care, reaching, carrying, lifting, house/yardwork, driving/computer work  [] (10922) Reviewed/Progressed HEP activities related to improving balance, coordination, kinesthetic sense, posture, motor skill, proprioception of scapular, scapulothoracic and UE control with self care, reaching, carrying, lifting, house/yardwork, driving/computer work      Manual Treatments:  PROM / STM / Oscillations-Mobs:  G-I, II, III, IV (PA's, Inf., Post.)  [x] (90074) Provided manual therapy to mobilize soft tissue/joints of cervical/CT, scapular GHJ and UE for the purpose of modulating pain, promoting relaxation,  increasing ROM, reducing/eliminating soft tissue swelling/inflammation/restriction, improving soft tissue extensibility and allowing for proper ROM for normal function with self care, reaching, carrying, lifting, house/yardwork, driving/computer work    Modalities:     [] GAME READY (VASO)- for significant edema, swelling, pain control. Charges:  Timed Code Treatment Minutes: 40   Total Treatment Minutes:  50      [] EVAL (LOW) 92058 (typically 20 minutes face-to-face)  [] EVAL (MOD) 52299 (typically 30 minutes face-to-face)  [] EVAL (HIGH) 97969 (typically 45 minutes face-to-face)  [] RE-EVAL     [x] HO(48032) x 1    [] IONTO  [] NMR (73139) x     [] VASO  [x] Manual (93158) x  2   [] Other:  [] TA x      [] Mech Traction (70194)  [] ES(attended) (17087)      [] ES (un) (33292):    ASSESSMENT:  Pt continues to exhibit tightness in L kj scap musculature and cervical paraspinals contributing to pain and symptoms. Pt reports reproduction of symptoms with manual pressure in musculature indicating myofascial tightness is contributing to pain and impairment. Pt has improved with medication changes related to mental health dx, notably recently being prescribed Ritalin for ADHS that has helped significantly. GOALS:     Patient stated goal: relieve some of the pain, be able to continue my new workout     Therapist goals for Patient:   Short Term Goals: To be achieved in: 2 weeks  1. Independent in HEP and progression per patient tolerance, in order to prevent re-injury. []? Progressing: []? Met: []? Not Met: []? Adjusted      2.  Patient will have a decrease in pain to facilitate improvement in movement, function, and ADLs as indicated by Functional Deficits. []? Progressing: []? Met: []? Not Met: []? Adjusted      Long Term Goals: To be achieved in: 12 weeks  1. Disability index score of 10% or less for the DASH to assist with reaching prior level of function. []? Progressing: []? Met: []? Not Met: []? Adjusted      2. Patient will demonstrate increased AROM to WNL to allow for proper joint functioning as indicated by patients Functional Deficits. []? Progressing: []? Met: []? Not Met: []? Adjusted      3. Patient will demonstrate an increase in Strength to 5/5 to allow for proper functional mobility as indicated by patients Functional Deficits. []? Progressing: []? Met: []? Not Met: []? Adjusted      4. Patient will return to all functional activities without increased symptoms or restriction. []? Progressing: []? Met: []? Not Met: []? Adjusted      5. Pt will exhibit self(patient specific functional goal)    []? Progressing: []? Met: []? Not Met: []? Adjusted           Access Code: [email or web address]  URL: [email or web address]  Date: 03/14/2022  Prepared by: Bienvenido Toth    Exercises  Standing Pec Stretch at [place] - 1 x daily - 7 x weekly - 1 sets - 3 reps - 20 sec hold  Standing Bilateral Low Shoulder Row with Anchored Resistance - 1 x daily - 7 x weekly - 3 sets - 10 reps  Standing Shoulder Internal Rotation with Anchored Resistance - 1 x daily - 7 x weekly - 3 sets - 10 reps  Shoulder External Rotation with Anchored Resistance - 1 x daily - 7 x weekly - 3 sets - 10 reps      Overall Progression Towards Functional goals/ Treatment Progress Update:  [] Patient is progressing as expected towards functional goals listed. [] Progression is slowed due to complexities/Impairments listed. [] Progression has been slowed due to co-morbidities.   [x] Plan just implemented, too soon to assess goals progression <30days   [] Goals require adjustment due to lack of progress  [] Patient is not progressing as expected and requires additional follow up with physician  [] Other    Prognosis for POC: [x] Good [] Fair  [] Poor      Patient requires continued skilled intervention: [x] Yes  [] No    Treatment/Activity Tolerance:  [x] Patient able to complete treatment  [] Patient limited by fatigue  [] Patient limited by pain    [] Patient limited by other medical complications  [] Other:       PLAN: See eval  [x] Continue per plan of care [] Alter current plan (see comments above)  [] Plan of care initiated [] Hold pending MD visit [] Discharge    Electronically signed by:  Emma Nolasco PT    Note: If patient does not return for scheduled/ recommended follow up visits, this note will serve as a discharge from care along with most recent update on progress.

## 2022-05-09 ENCOUNTER — NURSE TRIAGE (OUTPATIENT)
Dept: OTHER | Facility: CLINIC | Age: 27
End: 2022-05-09

## 2022-05-09 ENCOUNTER — OFFICE VISIT (OUTPATIENT)
Dept: PRIMARY CARE CLINIC | Age: 27
End: 2022-05-09
Payer: COMMERCIAL

## 2022-05-09 VITALS
SYSTOLIC BLOOD PRESSURE: 118 MMHG | BODY MASS INDEX: 32.51 KG/M2 | OXYGEN SATURATION: 98 % | HEART RATE: 88 BPM | HEIGHT: 60 IN | WEIGHT: 165.6 LBS | TEMPERATURE: 97.9 F | DIASTOLIC BLOOD PRESSURE: 72 MMHG

## 2022-05-09 DIAGNOSIS — M54.41 ACUTE RIGHT-SIDED LOW BACK PAIN WITH RIGHT-SIDED SCIATICA: Primary | ICD-10-CM

## 2022-05-09 PROCEDURE — G8427 DOCREV CUR MEDS BY ELIG CLIN: HCPCS | Performed by: FAMILY MEDICINE

## 2022-05-09 PROCEDURE — 99214 OFFICE O/P EST MOD 30 MIN: CPT | Performed by: FAMILY MEDICINE

## 2022-05-09 PROCEDURE — G8417 CALC BMI ABV UP PARAM F/U: HCPCS | Performed by: FAMILY MEDICINE

## 2022-05-09 PROCEDURE — 1036F TOBACCO NON-USER: CPT | Performed by: FAMILY MEDICINE

## 2022-05-09 RX ORDER — METHYLPHENIDATE HYDROCHLORIDE 5 MG/1
10 TABLET ORAL
COMMUNITY
Start: 2022-05-03

## 2022-05-09 RX ORDER — CYCLOBENZAPRINE HCL 5 MG
TABLET ORAL
COMMUNITY
Start: 2022-05-06

## 2022-05-09 RX ORDER — METHYLPHENIDATE HYDROCHLORIDE EXTENDED RELEASE 10 MG/1
15 TABLET ORAL
COMMUNITY
Start: 2022-05-05

## 2022-05-09 RX ORDER — METHYLPREDNISOLONE 4 MG/1
TABLET ORAL
Qty: 1 KIT | Refills: 0 | Status: SHIPPED | OUTPATIENT
Start: 2022-05-09 | End: 2022-05-16 | Stop reason: ALTCHOICE

## 2022-05-09 ASSESSMENT — ENCOUNTER SYMPTOMS
GASTROINTESTINAL NEGATIVE: 1
BACK PAIN: 1
RESPIRATORY NEGATIVE: 1
EYES NEGATIVE: 1

## 2022-05-09 NOTE — PROGRESS NOTES
SUBJECTIVE:  Patient ID: Mora Borja is a 32 y.o. y.o. female     HPI   Back Pain: Patient presents for presents evaluation of low back problems. Symptoms have been present for a few days and include numbness in right leg, pain in lower back (aching, sharp and throbbing in character; 7/10 in severity) and paresthesias in right leg. Initial inciting event: did neuroexam with Neurologist, did walk on her tipy toes. Symptoms are worst: morning, mid-day, afternoon. Alleviating factors identifiable by patient are none. Exacerbating factors identifiable by patient are bending forwards, bending sideways and sitting. Treatments so far initiated by patient: flexeril Previous lower back problems: long time ago. Previous workup: none. Previous treatments: none.         Past Medical History:   Diagnosis Date    Anxiety     Atypical migraine     Bipolar 2 disorder (HCC)     Borderline personality disorder ([facility]     Depression     Gestational diabetes     H/O drug abuse ([facility]     hx of heroin/cocaine    IBS (irritable bowel syndrome)     Obese     PCOS (polycystic ovarian syndrome)       Past Surgical History:   Procedure Laterality Date    COLONOSCOPY      ENDOSCOPY, COLON, DIAGNOSTIC      SEPTOPLASTY N/A 7/26/2021    SPHENOIDOTOMY RIGHT, BILATERAL ANTERIOR ETHMOIDECTOMY, BILATERAL INFERIOR TURBINATE REDUCTION, BILATERAL MAXILLARY ANTROSTOMY AND SEPTOPLASTY, NASAL SCOPE performed by Catarina Ochoa DO at [address] EXTRACTION       Family History   Problem Relation Age of Onset    Other Maternal Grandmother         demetia    Heart Disease Maternal Grandmother     High Cholesterol Maternal Grandfather     Diabetes Maternal Grandfather     High Blood Pressure Maternal Grandfather     Heart Disease Maternal Grandfather      Social History     Socioeconomic History    Marital status: Life Partner     Spouse name: None    Number of children: None    Years of education: None    Highest education level: None   Occupational History     Comment: Refused to answer   Tobacco Use    Smoking status: Never Smoker    Smokeless tobacco: Never Used   Vaping Use    Vaping Use: Never used   Substance and Sexual Activity    Alcohol use: Not Currently    Drug use: Not Currently     Comment: heroin-snort 5 years ago    Sexual activity: Yes     Partners: Male   Other Topics Concern    None   Social History Narrative    None     Social Determinants of Health     Financial Resource Strain: Low Risk     Difficulty of Paying Living Expenses: Not hard at all   Food Insecurity: No Food Insecurity    Worried About Running Out of Food in the Last Year: Never true    Mike of Food in the Last Year: Never true   Transportation Needs: No Transportation Needs    Lack of Transportation (Medical): No    Lack of Transportation (Non-Medical): No   Physical Activity: Sufficiently Active    Days of Exercise per Week: 6 days    Minutes of Exercise per Session: 40 min   Stress: No Stress Concern Present    Feeling of Stress : Not at all   Social Connections:  Moderately Isolated    Frequency of Communication with Friends and Family: Once a week    Frequency of Social Gatherings with Friends and Family: Twice a week    Attends Jew Services: Never    Active Member of Clubs or Organizations: No    Attends Club or Organization Meetings: Never    Marital Status: Living with partner   Intimate Partner Violence:     Fear of Current or Ex-Partner: Not on file    Emotionally Abused: Not on file    Physically Abused: Not on file    Sexually Abused: Not on file   Housing Stability: [address] Unable to Pay for Housing in the Last Year: No    Number of [place] in the Last Year: 1    Unstable Housing in the Last Year: No     Current Outpatient Medications   Medication Sig Dispense Refill    methylphenidate (METADATE ER) 10 MG extended release tablet TAKE 1 TABLET (10 MG) BY ORAL ROUTE EVERY MORNING  methylphenidate (RITALIN) 5 MG tablet       cyclobenzaprine (FLEXERIL) 5 MG tablet Take 1 tab nightly and as needed      celecoxib (CELEBREX) 200 MG capsule TAKE 1 CAPSULE BY MOUTH EVERY DAY 30 capsule 1    topiramate (TOPAMAX) 100 MG tablet TAKE 1 TABLET BY MOUTH TWICE A  tablet 0    amitriptyline (ELAVIL) 10 MG tablet TAKE 1 TABLET BY MOUTH EVERY DAY AT NIGHT 90 tablet 1    SUMAtriptan (IMITREX) 100 MG tablet TAKE 1 TABLET BY MOUTH ONCE AS NEEDED FOR MIGRAINE 12 tablet 1    cetirizine (ZYRTEC) 10 MG tablet Take 10 mg by mouth daily      albuterol sulfate HFA (VENTOLIN HFA) 108 (90 Base) MCG/ACT inhaler Inhale 2 puffs into the lungs every 6 hours as needed for Wheezing or Shortness of Breath (cough) 54 g 1    fluticasone (FLONASE) 50 MCG/ACT nasal spray 2 sprays by Nasal route daily 2 Bottle 5    EPINEPHrine (EPIPEN) 0.3 MG/0.3ML SOAJ injection Use as directed for allergic reaction 1 each 1    acetaminophen (APAP EXTRA STRENGTH) 500 MG tablet Take 1 tablet by mouth every 6 hours as needed for Pain Alternate every 3 hours with ibuprofen 60 tablet 0    metFORMIN (GLUCOPHAGE) 500 MG tablet Take 500 mg by mouth 2 times daily (with meals)       acyclovir (ZOVIRAX) 400 MG tablet Take 400 mg by mouth       No current facility-administered medications for this visit. Allergies   Allergen Reactions    Nickel Rash     Skin began to excoriate    Lexapro [Escitalopram Oxalate]      Jittery and could not sleep       Review of Systems   Constitutional: Negative. HENT: Negative. Eyes: Negative. Respiratory: Negative. Cardiovascular: Negative. Gastrointestinal: Negative. Musculoskeletal: Positive for back pain. All other systems reviewed and are negative.       OBJECTIVE:  /72 (Site: Right Upper Arm, Position: Sitting, Cuff Size: Medium Adult)   Pulse 88   Temp 97.9 °F (36.6 °C) (Temporal)   Ht 5' (1.524 m)   Wt 165 lb 9.6 oz (75.1 kg)   SpO2 98%   BMI 32.34 kg/m² Physical Exam  Vitals reviewed. Musculoskeletal:      Lumbar back: Deformity, spasms and tenderness present. No swelling, edema, signs of trauma or bony tenderness. Decreased range of motion. Positive right straight leg raise test. Negative left straight leg raise test. Scoliosis present. ASSESSMENT:   Diagnosis Orders   1.  Acute right-sided low back pain with right-sided sciatica  methylPREDNISolone (MEDROL, SANTY,) 4 MG tablet    XR SPINE SCOLIOSIS STANDING (1 VIEW)    XR LUMBAR SPINE (2-3 VIEWS)       PLAN:  See orders  Continue Flexeril I added Medrol Dosepak  We will wait for the results of the x-ray  May benefit from PT

## 2022-05-09 NOTE — TELEPHONE ENCOUNTER
Received call from Sri Marcus at [facility] with [employer]. Subjective: Caller states \"I was on a neurology appointment last week via telehealth and I was walking on my heels because they asked me too. I felt a twinge in my back and ever since I have been having pain in my back by my tailbone. Moving, twisting and bending hurts a lot. The pain radiates to my right hip \"     Current Symptoms: lower back pain    Onset: 3 days ago; , worsening    Associated Symptoms: reduced activity    Pain Severity: 9/10; sharp, dull, aching, shooting; constant    Temperature: none    What has been tried: muscle relaxers, heat and ice    LMP: NA Pregnant: NA    Recommended disposition: See in Office Today    Care advice provided, patient verbalizes understanding; denies any other questions or concerns; instructed to call back for any new or worsening symptoms. Patient/Caller agrees with recommended disposition; writer provided warm transfer to [employer] at [facility] for appointment scheduling     Attention Provider: Thank you for allowing me to participate in the care of your patient. The patient was connected to triage in response to information provided to the ECC/PSC. Please do not respond through this encounter as the response is not directed to a shared pool.           Reason for Disposition   SEVERE back pain (e.g., excruciating, unable to do any normal activities) and not improved after pain medicine and CARE ADVICE    Protocols used: BACK PAIN-ADULT-[place]

## 2022-05-10 ENCOUNTER — HOSPITAL ENCOUNTER (OUTPATIENT)
Dept: GENERAL RADIOLOGY | Age: 27
Discharge: HOME | End: 2022-05-10
Payer: COMMERCIAL

## 2022-05-10 DIAGNOSIS — M54.41 ACUTE RIGHT-SIDED LOW BACK PAIN WITH RIGHT-SIDED SCIATICA: ICD-10-CM

## 2022-05-10 PROCEDURE — 72081 X-RAY EXAM ENTIRE SPI 1 VW: CPT

## 2022-05-10 PROCEDURE — 72100 X-RAY EXAM L-S SPINE 2/3 VWS: CPT

## 2022-05-11 ENCOUNTER — HOSPITAL ENCOUNTER (OUTPATIENT)
Dept: PHYSICAL THERAPY | Age: 27
Setting detail: THERAPIES SERIES
Discharge: HOME | End: 2022-05-11
Payer: COMMERCIAL

## 2022-05-11 ENCOUNTER — NURSE ONLY (OUTPATIENT)
Dept: ENT CLINIC | Age: 27
End: 2022-05-11
Payer: COMMERCIAL

## 2022-05-11 DIAGNOSIS — J30.9 ALLERGIC RHINITIS, UNSPECIFIED SEASONALITY, UNSPECIFIED TRIGGER: ICD-10-CM

## 2022-05-11 PROCEDURE — 97110 THERAPEUTIC EXERCISES: CPT

## 2022-05-11 PROCEDURE — G0283 ELEC STIM OTHER THAN WOUND: HCPCS

## 2022-05-11 PROCEDURE — 97140 MANUAL THERAPY 1/> REGIONS: CPT

## 2022-05-11 PROCEDURE — 95117 IMMUNOTHERAPY INJECTIONS: CPT | Performed by: OTOLARYNGOLOGY

## 2022-05-11 NOTE — PROGRESS NOTES
Patient requested repeat of doses from last week due to high allergen exposures outdoors this week. Correct serum vials verified by patient and nurse. After obtaining consent, and per orders of Dr Enma Perry, injections of allergy serum given by MADHAV Faria RN. Patient instructed to remain in clinic for 30 minutes after injection, and to report any adverse reactions to me immediately. No change in patient status post injection.

## 2022-05-11 NOTE — FLOWSHEET NOTE
The [address]      Physical Therapy Treatment Note/ Progress Report:     Date:  2022    Patient Name:  Nita Ponce    :  1995  MRN: [id number]  Restrictions/Precautions:    Medical/Treatment Diagnosis Information:  · Diagnosis: M25.512 L shoulder pain  · Treatment Diagnosis: M25.512 L shoulder pain  Insurance/Certification information:   [facility]  Physician Information:  Referring Practitioner: Dr. Cassy Joy  Has the plan of care been signed (Y/N):        []  Yes  [x]  No     Date of Patient follow up with Physician: not scheduled    Is this a Progress Report:     []  Yes  [x]  No     If Yes:  Date Range for reporting period:  Beginning:  ------------ Ending:     Progress report will be due (10 Rx or 30 days whichever is less): 22 NEXT VISIT    Recertification will be due (POC Duration  / 90 days whichever is less): 22      Visit # Insurance Allowable Auth Required   In Person 8 12 visits (3/22 - ) [x]  Yes     []  No    Tele Health   []  Yes     []  No    Total 8       Functional Scale: UEFI: NV    Date assessed:  NV      Latex Allergy:  [x]NO      []YES  Preferred Language for Healthcare:   [x]English       []other:    Pain level:  2-3/10 neck, 2-3/10 shoulder    SUBJECTIVE:  Pt reports overall improvement in shoulder/neck region however continues to note that \"spot\" is tight and painful. Pt did have a HA this morning but took her medication and it has gone away. Pt also notes onset low back pain after assessment (heel walk specifically) done in neurologist office. Pt went to PCP who performed x-rays, showed scoliosis. Pt is having R sided LBP.      Note: pt contacted insurance and DN is covered and no pre-auth is required    OBJECTIVE:    Observation:    Test measurements:      RESTRICTIONS/PRECAUTIONS: none    Exercises/Interventions:   Therapeutic Ex (57401) Sets/sec Reps Notes/CUES HEP   Row 2 10 Black TB, Tried ext (? pain) x   IR/ER  No money 2 10 GTB x   pec S at wall 20\" 3  x   Open book 1, hold 5 sec 10  x    3 10 RTB x   Chin tuck 3\" 10  x   LS S 10\" 5 To R only x                               Pt edu: low back ex (child's pose, SKTC, piriformis S, cat/camel)   Note that pt uses lacrosse ball at home for self massage     Manual Intervention ([id number])                 SL scap mobs, STM kj scap (LS) x8'  Radiating pain into head and neck, tingling into hand with pressure to LS, rhomboid/mid trap     x8'      Hawks  x8'  sitting           NMR re-education (81657)   CUES NEEDED                                                                   Therapeutic Activity (14093)                                          [employer] access code:    [email or web address]           Therapeutic Exercise and NMR EXR  [x] (04942) Provided verbal/tactile cueing for activities related to strengthening, flexibility, endurance, ROM  for improvements in scapular, scapulothoracic and UE control with self care, reaching, carrying, lifting, house/yardwork, driving/computer work.    [] (39714) Provided verbal/tactile cueing for activities related to improving balance, coordination, kinesthetic sense, posture, motor skill, proprioception  to assist with  scapular, scapulothoracic and UE control with self care, reaching, carrying, lifting, house/yardwork, driving/computer work. Therapeutic Activities:    [] (47131 or 78389) Provided verbal/tactile cueing for activities related to improving balance, coordination, kinesthetic sense, posture, motor skill, proprioception and motor activation to allow for proper function of scapular, scapulothoracic and UE control with self care, carrying, lifting, driving/computer work.      Home Exercise Program:    [x] (15192) Reviewed/Progressed HEP activities related to strengthening, flexibility, endurance, ROM of scapular, scapulothoracic and UE control with self care, reaching, carrying, lifting, house/yardwork, driving/computer work  [] (26865) Reviewed/Progressed HEP activities related to improving balance, coordination, kinesthetic sense, posture, motor skill, proprioception of scapular, scapulothoracic and UE control with self care, reaching, carrying, lifting, house/yardwork, driving/computer work      Manual Treatments:  PROM / STM / Oscillations-Mobs:  G-I, II, III, IV (PA's, Inf., Post.)  [x] (83110) Provided manual therapy to mobilize soft tissue/joints of cervical/CT, scapular GHJ and UE for the purpose of modulating pain, promoting relaxation,  increasing ROM, reducing/eliminating soft tissue swelling/inflammation/restriction, improving soft tissue extensibility and allowing for proper ROM for normal function with self care, reaching, carrying, lifting, house/yardwork, driving/computer work    Modalities:  Electrical Stimulation for pain control, swelling reduction. Waveform: premod; Cycle Time: Continuous; Frequency: 100 pps; Polarity: Negative; Ramp: 2 sec; Amplitude: 7.4 Volts Treatment time: 10 min. [] GAME READY (VASO)- for significant edema, swelling, pain control. Charges:  Timed Code Treatment Minutes: 40   Total Treatment Minutes:  50      [] EVAL (LOW) 83432 (typically 20 minutes face-to-face)  [] EVAL (MOD) 13253 (typically 30 minutes face-to-face)  [] EVAL (HIGH) 63941 (typically 45 minutes face-to-face)  [] RE-EVAL     [x] CY(59335) x 2   [] IONTO  [] NMR (36798) x     [] VASO  [x] Manual (19095) x  1   [] Other:  [] TA x      [] Mech Traction (61220)  [] ES(attended) (69237)      [x] ES (un) (56606):    ASSESSMENT:  Pt continues to exhibit tightness in L kj scap musculature and cervical paraspinals contributing to pain and symptoms. Pt reports reproduction of symptoms with manual pressure in musculature indicating myofascial tightness is contributing to pain and impairment.  Pt has improved with medication changes related to mental health dx, notably recently being prescribed Ritalin for ADHD that has helped significantly. GOALS:     Patient stated goal: relieve some of the pain, be able to continue my new workout     Therapist goals for Patient:   Short Term Goals: To be achieved in: 2 weeks  1. Independent in HEP and progression per patient tolerance, in order to prevent re-injury. []? Progressing: []? Met: []? Not Met: []? Adjusted      2. Patient will have a decrease in pain to facilitate improvement in movement, function, and ADLs as indicated by Functional Deficits. []? Progressing: []? Met: []? Not Met: []? Adjusted      Long Term Goals: To be achieved in: 12 weeks  1. Disability index score of 10% or less for the DASH to assist with reaching prior level of function. []? Progressing: []? Met: []? Not Met: []? Adjusted      2. Patient will demonstrate increased AROM to WNL to allow for proper joint functioning as indicated by patients Functional Deficits. []? Progressing: []? Met: []? Not Met: []? Adjusted      3. Patient will demonstrate an increase in Strength to 5/5 to allow for proper functional mobility as indicated by patients Functional Deficits. []? Progressing: []? Met: []? Not Met: []? Adjusted      4. Patient will return to all functional activities without increased symptoms or restriction. []? Progressing: []? Met: []? Not Met: []? Adjusted      5. Pt will exhibit self(patient specific functional goal)    []? Progressing: []? Met: []? Not Met: []? Adjusted        Access Code: [email or web address]  URL: [email or web address]  Date: 03/14/2022  Prepared by: Jose Eduardo Sides    Exercises  Standing Pec Stretch at [place] - 1 x daily - 7 x weekly - 1 sets - 3 reps - 20 sec hold  Standing Bilateral Low Shoulder Row with Anchored Resistance - 1 x daily - 7 x weekly - 3 sets - 10 reps  Standing Shoulder Internal Rotation with Anchored Resistance - 1 x daily - 7 x weekly - 3 sets - 10 reps  Shoulder External Rotation with Anchored Resistance - 1 x daily - 7 x weekly - 3 sets - 10 reps      Overall Progression Towards Functional goals/ Treatment Progress Update:  [] Patient is progressing as expected towards functional goals listed. [] Progression is slowed due to complexities/Impairments listed. [] Progression has been slowed due to co-morbidities. [x] Plan just implemented, too soon to assess goals progression <30days   [] Goals require adjustment due to lack of progress  [] Patient is not progressing as expected and requires additional follow up with physician  [] Other    Prognosis for POC: [x] Good [] Fair  [] Poor      Patient requires continued skilled intervention: [x] Yes  [] No    Treatment/Activity Tolerance:  [x] Patient able to complete treatment  [] Patient limited by fatigue  [] Patient limited by pain    [] Patient limited by other medical complications  [] Other:       PLAN: See eval  [x] Continue per plan of care [] Alter current plan (see comments above)  [] Plan of care initiated [] Hold pending MD visit [] Discharge    Electronically signed by:  Vy Hays, PT    Note: If patient does not return for scheduled/ recommended follow up visits, this note will serve as a discharge from care along with most recent update on progress.

## 2022-05-16 ENCOUNTER — NURSE ONLY (OUTPATIENT)
Dept: ENT CLINIC | Age: 27
End: 2022-05-16
Payer: COMMERCIAL

## 2022-05-16 DIAGNOSIS — J30.9 ALLERGIC RHINITIS, UNSPECIFIED SEASONALITY, UNSPECIFIED TRIGGER: ICD-10-CM

## 2022-05-16 PROCEDURE — 95117 IMMUNOTHERAPY INJECTIONS: CPT | Performed by: OTOLARYNGOLOGY

## 2022-05-16 NOTE — PROGRESS NOTES
Correct serum vials verified by patient and nurse. After obtaining consent, and per orders of Dr Joseph Villalobos, injections of allergy serum given by Felecia Buerger, RN. Patient instructed to remain in clinic for 30 minutes after injection, and to report any adverse reactions to me immediately. No change in patient status post injection.

## 2022-05-18 ENCOUNTER — HOSPITAL ENCOUNTER (OUTPATIENT)
Dept: PHYSICAL THERAPY | Age: 27
Setting detail: THERAPIES SERIES
Discharge: HOME | End: 2022-05-18
Payer: COMMERCIAL

## 2022-05-18 PROCEDURE — 97140 MANUAL THERAPY 1/> REGIONS: CPT

## 2022-05-18 PROCEDURE — 97110 THERAPEUTIC EXERCISES: CPT

## 2022-05-18 NOTE — FLOWSHEET NOTE
The [facility]      Physical Therapy Treatment Note/ Progress Report:     Date:  2022    Patient Name:  Tyrone Briones    :  1995  MRN: [id number]  Restrictions/Precautions:    Medical/Treatment Diagnosis Information:  · Diagnosis: M25.512 L shoulder pain  · Treatment Diagnosis: M25.512 L shoulder pain  Insurance/Certification information:   [facility]  Physician Information:  Referring Practitioner: Dr. Marry Hoffmann  Has the plan of care been signed (Y/N):        []  Yes  [x]  No     Date of Patient follow up with Physician: not scheduled    Is this a Progress Report:     []  Yes  [x]  No     If Yes:  Date Range for reporting period:  Beginning:  ------------ Ending:     Progress report will be due (10 Rx or 30 days whichever is less): 22 NEXT VISIT    Recertification will be due (POC Duration  / 90 days whichever is less): 22      Visit # Insurance Allowable Auth Required   In Person 9 12 visits (3/22 - ) [x]  Yes     []  No    Tele Health   []  Yes     []  No    Total 9       Functional Scale: UEFI: NV    Date assessed:  NV      Latex Allergy:  [x]NO      []YES  Preferred Language for Healthcare:   [x]English       []other:    Pain level:  4/10 neck, 4/10 shoulder    SUBJECTIVE:  Pt has felt better overall. She had no HA this past week and was able to return to her pole fitness class last night and felt pretty good.      Note: pt contacted insurance and DN is covered and no pre-auth is required    OBJECTIVE:    Observation:    Test measurements:      RESTRICTIONS/PRECAUTIONS: none    Exercises/Interventions:   Therapeutic Ex (03279) Sets/sec Reps Notes/CUES HEP   Row 2 10 Black TB, Tried ext (? pain) x   IR/ER  No money 2 10 GTB x   pec S at wall 20\" 3  x   Open book 1, hold 5 sec 10  x    3 10 RTB x   3\" 10  x   10\" 5 To R only x                               Pt edu: low back ex (child's pose, SKTC, piriformis S, cat/camel)   Note that pt uses lacrosse ball at home for self massage     Manual Intervention ([id number])            SOR,     SL scap mobs, STM kj scap (LS) x8'  Radiating pain into head and neck, tingling into hand with pressure to LS, rhomboid/mid trap    STM upper trap, cervical paraspinals x8'      Hawks  x8'  sitting           NMR re-education (76071)   CUES NEEDED                                                                   Therapeutic Activity (93227)                                          [employer] access code:    [email or web address]           Therapeutic Exercise and NMR EXR  [x] (00771) Provided verbal/tactile cueing for activities related to strengthening, flexibility, endurance, ROM  for improvements in scapular, scapulothoracic and UE control with self care, reaching, carrying, lifting, house/yardwork, driving/computer work.    [] (57685) Provided verbal/tactile cueing for activities related to improving balance, coordination, kinesthetic sense, posture, motor skill, proprioception  to assist with  scapular, scapulothoracic and UE control with self care, reaching, carrying, lifting, house/yardwork, driving/computer work. Therapeutic Activities:    [] (76555 or 28265) Provided verbal/tactile cueing for activities related to improving balance, coordination, kinesthetic sense, posture, motor skill, proprioception and motor activation to allow for proper function of scapular, scapulothoracic and UE control with self care, carrying, lifting, driving/computer work.      Home Exercise Program:    [x] (03315) Reviewed/Progressed HEP activities related to strengthening, flexibility, endurance, ROM of scapular, scapulothoracic and UE control with self care, reaching, carrying, lifting, house/yardwork, driving/computer work  [] (56758) Reviewed/Progressed HEP activities related to improving balance, coordination, kinesthetic sense, posture, motor skill, proprioception of scapular, scapulothoracic and UE control with self care, reaching, carrying, lifting, house/yardwork, driving/computer work      Manual Treatments:  PROM / STM / Oscillations-Mobs:  G-I, II, III, IV (PA's, Inf., Post.)  [x] (48441) Provided manual therapy to mobilize soft tissue/joints of cervical/CT, scapular GHJ and UE for the purpose of modulating pain, promoting relaxation,  increasing ROM, reducing/eliminating soft tissue swelling/inflammation/restriction, improving soft tissue extensibility and allowing for proper ROM for normal function with self care, reaching, carrying, lifting, house/yardwork, driving/computer work    Modalities:  Electrical Stimulation for pain control, swelling reduction. Waveform: premod; Cycle Time: Continuous; Frequency: 100 pps; Polarity: Negative; Ramp: 2 sec; Amplitude: 7.4 Volts Treatment time: 10 min. [] GAME READY (VASO)- for significant edema, swelling, pain control. Charges:  Timed Code Treatment Minutes: 45   Total Treatment Minutes:  45      [] EVAL (LOW) 26225 (typically 20 minutes face-to-face)  [] EVAL (MOD) 01671 (typically 30 minutes face-to-face)  [] EVAL (HIGH) 84151 (typically 45 minutes face-to-face)  [] RE-EVAL     [x] ME(35628) x 1   [] IONTO  [] NMR (45380) x     [] VASO  [x] Manual (80572) x  2   [] Other:  [] TA x      [] Mech Traction (31082)  [] ES(attended) (61960)      [] ES (un) (83659):    ASSESSMENT:  Pt continues to exhibit tightness in L kj scap musculature and cervical paraspinals contributing to pain and symptoms. Pt reports reproduction of symptoms with manual pressure in musculature indicating myofascial tightness is contributing to pain and impairment. Pt has improved with medication changes related to mental health dx, notably recently being prescribed Ritalin for ADHD that has helped significantly. GOALS:     Patient stated goal: relieve some of the pain, be able to continue my new workout     Therapist goals for Patient:   Short Term Goals: To be achieved in: 2 weeks  1.  Independent in HEP and progression per patient tolerance, in order to prevent re-injury. []? Progressing: []? Met: []? Not Met: []? Adjusted      2. Patient will have a decrease in pain to facilitate improvement in movement, function, and ADLs as indicated by Functional Deficits. []? Progressing: []? Met: []? Not Met: []? Adjusted      Long Term Goals: To be achieved in: 12 weeks  1. Disability index score of 10% or less for the DASH to assist with reaching prior level of function. []? Progressing: []? Met: []? Not Met: []? Adjusted      2. Patient will demonstrate increased AROM to WNL to allow for proper joint functioning as indicated by patients Functional Deficits. []? Progressing: []? Met: []? Not Met: []? Adjusted      3. Patient will demonstrate an increase in Strength to 5/5 to allow for proper functional mobility as indicated by patients Functional Deficits. []? Progressing: []? Met: []? Not Met: []? Adjusted      4. Patient will return to all functional activities without increased symptoms or restriction. []? Progressing: []? Met: []? Not Met: []? Adjusted      5. Pt will exhibit self(patient specific functional goal)    []? Progressing: []? Met: []? Not Met: []? Adjusted        Access Code: [email or web address]  URL: [email or web address]  Date: 03/14/2022  Prepared by: Angela Ness    Exercises  Standing Pec Stretch at [place] - 1 x daily - 7 x weekly - 1 sets - 3 reps - 20 sec hold  Standing Bilateral Low Shoulder Row with Anchored Resistance - 1 x daily - 7 x weekly - 3 sets - 10 reps  Standing Shoulder Internal Rotation with Anchored Resistance - 1 x daily - 7 x weekly - 3 sets - 10 reps  Shoulder External Rotation with Anchored Resistance - 1 x daily - 7 x weekly - 3 sets - 10 reps      Overall Progression Towards Functional goals/ Treatment Progress Update:  [x] Patient is progressing as expected towards functional goals listed. [] Progression is slowed due to complexities/Impairments listed.   [] Progression has been slowed due to co-morbidities. [] Plan just implemented, too soon to assess goals progression <30days   [] Goals require adjustment due to lack of progress  [] Patient is not progressing as expected and requires additional follow up with physician  [] Other    Prognosis for POC: [x] Good [] Fair  [] Poor      Patient requires continued skilled intervention: [x] Yes  [] No    Treatment/Activity Tolerance:  [x] Patient able to complete treatment  [] Patient limited by fatigue  [] Patient limited by pain    [] Patient limited by other medical complications  [] Other:       PLAN: See eval  [x] Continue per plan of care [] Alter current plan (see comments above)  [] Plan of care initiated [] Hold pending MD visit [] Discharge    Electronically signed by:  Noah Lopez PT    Note: If patient does not return for scheduled/ recommended follow up visits, this note will serve as a discharge from care along with most recent update on progress.

## 2022-05-23 ENCOUNTER — NURSE ONLY (OUTPATIENT)
Dept: ENT CLINIC | Age: 27
End: 2022-05-23
Payer: COMMERCIAL

## 2022-05-23 DIAGNOSIS — J30.9 ALLERGIC RHINITIS, UNSPECIFIED SEASONALITY, UNSPECIFIED TRIGGER: ICD-10-CM

## 2022-05-23 PROCEDURE — 95117 IMMUNOTHERAPY INJECTIONS: CPT | Performed by: OTOLARYNGOLOGY

## 2022-05-23 NOTE — PROGRESS NOTES
Left arm local reaction, Pollens, >30mm. Decrease dose this week to 0.35ml. Correct serum vials verified by patient and nurse. After obtaining consent, and per orders of Dr Yogesh Rodriguez, injections of allergy serum given by MADHAV Faria RN. Patient instructed to remain in clinic for 30 minutes after injection, and to report any adverse reactions to me immediately. No change in patient status post injection.

## 2022-05-25 ENCOUNTER — HOSPITAL ENCOUNTER (OUTPATIENT)
Dept: PHYSICAL THERAPY | Age: 27
Setting detail: THERAPIES SERIES
Discharge: HOME | End: 2022-05-25
Payer: COMMERCIAL

## 2022-05-25 PROCEDURE — 97110 THERAPEUTIC EXERCISES: CPT

## 2022-05-25 PROCEDURE — 97140 MANUAL THERAPY 1/> REGIONS: CPT

## 2022-05-25 NOTE — FLOWSHEET NOTE
[facility]      Physical Therapy Treatment Note/ Progress Report:     Date:  2022    Patient Name:  Swetha Bass    :  1995  MRN: [id number]  Restrictions/Precautions:    Medical/Treatment Diagnosis Information:  · Diagnosis: M25.512 L shoulder pain  · Treatment Diagnosis: M25.512 L shoulder pain  Insurance/Certification information:   [facility]  Physician Information:  Referring Practitioner: Dr. Jose Garza  Has the plan of care been signed (Y/N):        []  Yes  [x]  No     Date of Patient follow up with Physician: not scheduled    Is this a Progress Report:     []  Yes  [x]  No     If Yes:  Date Range for reporting period:  Beginning:  ------------ Ending:     Progress report will be due (10 Rx or 30 days whichever is less): 22 NEXT VISIT    Recertification will be due (POC Duration  / 90 days whichever is less): 22      Visit # Insurance Allowable Auth Required   In Person 10 12 visits (3/22 - ) [x]  Yes     []  No    Tele Health   []  Yes     []  No    Total 10       Functional Scale: UEFI: NV    Date assessed:  NV      Latex Allergy:  [x]NO      []YES  Preferred Language for Healthcare:   [x]English       []other:    Pain level:  2/10 neck, 2/10 shoulder blade region    SUBJECTIVE:  Pt reports she feels improvement overall. She notes her constant level of pain is much lower overall but does occasionally flare up to her pre-PT level. Pt also had a couple HA however medication helps significantly.      Note: pt contacted insurance and DN is covered and no pre-auth is required    OBJECTIVE:    Observation:    Test measurements:      RESTRICTIONS/PRECAUTIONS: none    Exercises/Interventions:   Therapeutic Ex (60099) Sets/sec Reps Notes/CUES HEP   Row 2 10 Black TB, Tried ext (? pain) x   IR/ER  No money 2  2 10  15 GTB x   pec S at wall 20\" 3  x   Open book 1, hold 5 sec 10  x   horiz ABD 3 10 RTB x   3\" 10 NV x   10\" 5 To R only x                               Pt edu: low back ex (child's pose, SKTC, piriformis S, cat/camel)   Note that pt uses lacrosse ball at home for self massage     Manual Intervention ([id number])            SOR,     SL scap mobs, STM kj scap (LS) x8'  Radiating pain into head and neck, tingling into hand with pressure to LS, rhomboid/mid trap    STM upper trap, cervical paraspinals x8'      Hawks  x8'  sitting           NMR re-education (30149)   CUES NEEDED                                                                   Therapeutic Activity (17428)                                          [employer] access code:    [email or web address]           Therapeutic Exercise and NMR EXR  [x] (96688) Provided verbal/tactile cueing for activities related to strengthening, flexibility, endurance, ROM  for improvements in scapular, scapulothoracic and UE control with self care, reaching, carrying, lifting, house/yardwork, driving/computer work.    [] (50012) Provided verbal/tactile cueing for activities related to improving balance, coordination, kinesthetic sense, posture, motor skill, proprioception  to assist with  scapular, scapulothoracic and UE control with self care, reaching, carrying, lifting, house/yardwork, driving/computer work. Therapeutic Activities:    [] (76324 or 92347) Provided verbal/tactile cueing for activities related to improving balance, coordination, kinesthetic sense, posture, motor skill, proprioception and motor activation to allow for proper function of scapular, scapulothoracic and UE control with self care, carrying, lifting, driving/computer work.      Home Exercise Program:    [x] (27941) Reviewed/Progressed HEP activities related to strengthening, flexibility, endurance, ROM of scapular, scapulothoracic and UE control with self care, reaching, carrying, lifting, house/yardwork, driving/computer work  [] (04137) Reviewed/Progressed HEP activities related to improving balance, coordination, kinesthetic sense, posture, motor skill, proprioception of scapular, scapulothoracic and UE control with self care, reaching, carrying, lifting, house/yardwork, driving/computer work      Manual Treatments:  PROM / STM / Oscillations-Mobs:  G-I, II, III, IV (PA's, Inf., Post.)  [x] (80384) Provided manual therapy to mobilize soft tissue/joints of cervical/CT, scapular GHJ and UE for the purpose of modulating pain, promoting relaxation,  increasing ROM, reducing/eliminating soft tissue swelling/inflammation/restriction, improving soft tissue extensibility and allowing for proper ROM for normal function with self care, reaching, carrying, lifting, house/yardwork, driving/computer work    Modalities:  Electrical Stimulation for pain control, swelling reduction. Waveform: premod; Cycle Time: Continuous; Frequency: 100 pps; Polarity: Negative; Ramp: 2 sec; Amplitude: 7.4 Volts Treatment time: 10 min. [] GAME READY (VASO)- for significant edema, swelling, pain control. Charges:  Timed Code Treatment Minutes: 45   Total Treatment Minutes:  45      [] EVAL (LOW) 68761 (typically 20 minutes face-to-face)  [] EVAL (MOD) 79397 (typically 30 minutes face-to-face)  [] EVAL (HIGH) 43328 (typically 45 minutes face-to-face)  [] RE-EVAL     [x] UC(35378) x 1   [] IONTO  [] NMR (95183) x     [] VASO  [x] Manual (20222) x  2   [] Other:  [] TA x      [] Mech Traction (68742)  [] ES(attended) (87680)      [] ES (un) (17897):    ASSESSMENT:  Pt continues to exhibit tightness in L kj scap musculature and cervical paraspinals contributing to pain and symptoms. Pt reports reproduction of symptoms with manual pressure in musculature indicating myofascial tightness is contributing to pain and impairment. Pt does report overall significant improvement in symptoms and will benefit progressive posture strengthening.      GOALS:     Patient stated goal: relieve some of the pain, be able to continue my new workout     Therapist goals for Patient:   Short Term Goals: To be achieved in: 2 weeks  1. Independent in HEP and progression per patient tolerance, in order to prevent re-injury. []? Progressing: []? Met: []? Not Met: []? Adjusted      2. Patient will have a decrease in pain to facilitate improvement in movement, function, and ADLs as indicated by Functional Deficits. []? Progressing: []? Met: []? Not Met: []? Adjusted      Long Term Goals: To be achieved in: 12 weeks  1. Disability index score of 10% or less for the DASH to assist with reaching prior level of function. []? Progressing: []? Met: []? Not Met: []? Adjusted      2. Patient will demonstrate increased AROM to WNL to allow for proper joint functioning as indicated by patients Functional Deficits. []? Progressing: []? Met: []? Not Met: []? Adjusted      3. Patient will demonstrate an increase in Strength to 5/5 to allow for proper functional mobility as indicated by patients Functional Deficits. []? Progressing: []? Met: []? Not Met: []? Adjusted      4. Patient will return to all functional activities without increased symptoms or restriction. []? Progressing: []? Met: []? Not Met: []? Adjusted      5. Pt will exhibit self(patient specific functional goal)    []? Progressing: []? Met: []? Not Met: []? Adjusted        Access Code: [email or web address]  URL: [email or web address]  Date: 03/14/2022  Prepared by: Maverick Limbesperanza    Exercises  Standing Pec Stretch at [place] - 1 x daily - 7 x weekly - 1 sets - 3 reps - 20 sec hold  Standing Bilateral Low Shoulder Row with Anchored Resistance - 1 x daily - 7 x weekly - 3 sets - 10 reps  Standing Shoulder Internal Rotation with Anchored Resistance - 1 x daily - 7 x weekly - 3 sets - 10 reps  Shoulder External Rotation with Anchored Resistance - 1 x daily - 7 x weekly - 3 sets - 10 reps      Overall Progression Towards Functional goals/ Treatment Progress Update:  [x] Patient is progressing as expected towards functional goals listed. [] Progression is slowed due to complexities/Impairments listed. [] Progression has been slowed due to co-morbidities. [] Plan just implemented, too soon to assess goals progression <30days   [] Goals require adjustment due to lack of progress  [] Patient is not progressing as expected and requires additional follow up with physician  [] Other    Prognosis for POC: [x] Good [] Fair  [] Poor      Patient requires continued skilled intervention: [x] Yes  [] No    Treatment/Activity Tolerance:  [x] Patient able to complete treatment  [] Patient limited by fatigue  [] Patient limited by pain    [] Patient limited by other medical complications  [] Other:       PLAN: See eval  [x] Continue per plan of care [] Alter current plan (see comments above)  [] Plan of care initiated [] Hold pending MD visit [] Discharge    Electronically signed by:  Augusta Ndiaye PT    Note: If patient does not return for scheduled/ recommended follow up visits, this note will serve as a discharge from care along with most recent update on progress.

## 2022-05-31 ENCOUNTER — NURSE ONLY (OUTPATIENT)
Dept: ENT CLINIC | Age: 27
End: 2022-05-31
Payer: COMMERCIAL

## 2022-05-31 DIAGNOSIS — J30.9 ALLERGIC RHINITIS, UNSPECIFIED SEASONALITY, UNSPECIFIED TRIGGER: ICD-10-CM

## 2022-05-31 PROCEDURE — 95117 IMMUNOTHERAPY INJECTIONS: CPT | Performed by: OTOLARYNGOLOGY

## 2022-05-31 NOTE — PROGRESS NOTES
Local reaction on both arms >30mm. No other symptoms. Pt also had heavy pollen exposure this week. Will decrease doses of both injections. Correct serum vials verified by patient and nurse. After obtaining consent, and per orders of Dr Carolyn Rooney, injections of allergy serum given by MADHAV Faria RN. Patient instructed to remain in clinic for 30 minutes after injection, and to report any adverse reactions to me immediately. No change in patient status post injection.

## 2022-06-02 ENCOUNTER — TELEPHONE (OUTPATIENT)
Dept: PRIMARY CARE CLINIC | Age: 27
End: 2022-06-02

## 2022-06-02 NOTE — TELEPHONE ENCOUNTER
Patient called in. Recent dx of ADHD from Psychologist.    Suggested by Psychologist that patient ask PCP to have    GENESIGHT TESTING done.    ///////////////////////////////////    Patient is unsure where the DX on mychart came from    [address] [facility]    and    [address] [facility]    Patient said these were ruled out by Psychologist, and wants to know if there is a way to removed these dx.     Please call patient if any questions  [phone]

## 2022-06-03 NOTE — TELEPHONE ENCOUNTER
Do we have GeneSight testing here?   If yes to stop the patient coming get it done  I will remove the other diagnoses from her chart

## 2022-06-03 NOTE — TELEPHONE ENCOUNTER
Voicemail left for patient explaining that genesight kit will be shipped to her home with instructions and that diagnosis will be taken out of her chart per Dr. Dion Metcalf.

## 2022-06-06 ENCOUNTER — NURSE ONLY (OUTPATIENT)
Dept: ENT CLINIC | Age: 27
End: 2022-06-06
Payer: COMMERCIAL

## 2022-06-06 ENCOUNTER — TELEPHONE (OUTPATIENT)
Dept: ENT CLINIC | Age: 27
End: 2022-06-06

## 2022-06-06 DIAGNOSIS — J30.9 ALLERGIC RHINITIS, UNSPECIFIED SEASONALITY, UNSPECIFIED TRIGGER: ICD-10-CM

## 2022-06-06 PROCEDURE — 95117 IMMUNOTHERAPY INJECTIONS: CPT | Performed by: OTOLARYNGOLOGY

## 2022-06-06 NOTE — TELEPHONE ENCOUNTER
Chief Complaint   Patient presents with     RECHECK     Diabetic foot care            Allergies   Allergen Reactions     Penicillins Rash     Unasyn Rash     No evidence SJS, but very uncomfortable and precipitated multiple provider visits. Would not use penicillins again if other options available.          Subjective: Supriya is a 68 year old female who presents to the clinic today for a diabetic foot exam and management. Lesion on ankle dx'ed as psoriasis. Relates no new acute LE complaints. She did get her diabetic shoes. Relates that her right leg did get swollen and she has a wound open on the anterior right leg that started as a blister. This has scabbed over.     Objective  Non-palpable DP and PT pulses BL.   Equinus noted BL. Pes planus with rigid toe deformities noted to lesser digits on the right. Left AKA noted.   Nails are thickened, discolored, elongated, with subungual debris consistent with onychomycosis.    Scabbed venous ulcer on the anterior right leg. No s/s of infection.  No open lesion associated. No bleeding. No pain to the area.      Assessment: DM2 with left AKA and neuropathy - presenting for a diabetic foot exam.   Onychomycosis.      Plan:   - Pt seen and evaluated  - Nails debrided x 5.  - Rx for open toes compression socks.  - See again in 3 months.     Patient has been receiving allergy immunotherapy injections with concentrate, but has not built up to maintenance doing completely yet. She has had some local reactions on both arms and has had heavy outdoor exposure days this season. Recommend continuing on the current concentrate build up to get to Maintenance dosing. Will mix refill of Current Prescription Treatment Sets. Please advise.

## 2022-06-06 NOTE — PROGRESS NOTES
Correct serum vials verified by patient and nurse. After obtaining consent, and per orders of Dr Dre Galeana, injections of allergy serum given by MADHAV Faria RN. Patient instructed to remain in clinic for 30 minutes after injection, and to report any adverse reactions to me immediately. No change in patient status post injection.

## 2022-06-08 ENCOUNTER — APPOINTMENT (OUTPATIENT)
Dept: PHYSICAL THERAPY | Age: 27
End: 2022-06-08
Payer: COMMERCIAL

## 2022-06-10 ENCOUNTER — NURSE ONLY (OUTPATIENT)
Dept: ENT CLINIC | Age: 27
End: 2022-06-10
Payer: COMMERCIAL

## 2022-06-10 DIAGNOSIS — J30.9 ALLERGIC RHINITIS, UNSPECIFIED SEASONALITY, UNSPECIFIED TRIGGER: ICD-10-CM

## 2022-06-10 PROCEDURE — 95165 ANTIGEN THERAPY SERVICES: CPT | Performed by: OTOLARYNGOLOGY

## 2022-06-13 ENCOUNTER — NURSE ONLY (OUTPATIENT)
Dept: ENT CLINIC | Age: 27
End: 2022-06-13
Payer: COMMERCIAL

## 2022-06-13 DIAGNOSIS — J30.9 ALLERGIC RHINITIS, UNSPECIFIED SEASONALITY, UNSPECIFIED TRIGGER: ICD-10-CM

## 2022-06-13 PROCEDURE — 95117 IMMUNOTHERAPY INJECTIONS: CPT | Performed by: OTOLARYNGOLOGY

## 2022-06-13 NOTE — PROGRESS NOTES
Correct serum vials verified by patient and nurse. Consent obtained and SVT completed. SVT P=9mm wheal, M=7mm wheal.  After obtaining consent, and per orders of Dr Jaylene Marcus, injections of allergy serum given by MADHAV Faria RN. Patient instructed to remain in clinic for 30 minutes after injection, and to report any adverse reactions to me immediately. No change in patient status post injection.

## 2022-06-15 ENCOUNTER — PATIENT MESSAGE (OUTPATIENT)
Dept: PRIMARY CARE CLINIC | Age: 27
End: 2022-06-15

## 2022-06-15 ENCOUNTER — HOSPITAL ENCOUNTER (OUTPATIENT)
Dept: PHYSICAL THERAPY | Age: 27
Setting detail: THERAPIES SERIES
Discharge: HOME | End: 2022-06-15
Payer: COMMERCIAL

## 2022-06-15 PROCEDURE — G0283 ELEC STIM OTHER THAN WOUND: HCPCS

## 2022-06-15 PROCEDURE — 97110 THERAPEUTIC EXERCISES: CPT

## 2022-06-15 PROCEDURE — 97140 MANUAL THERAPY 1/> REGIONS: CPT

## 2022-06-15 NOTE — FLOWSHEET NOTE
[facility]      Physical Therapy Treatment Note/ Progress Report:     Date:  6/15/2022    Patient Name:  Garth Pozo    :  1995  MRN: [id number]  Restrictions/Precautions:    Medical/Treatment Diagnosis Information:  · Diagnosis: M25.512 L shoulder pain  · Treatment Diagnosis: M25.512 L shoulder pain  Insurance/Certification information:   [facility]  Physician Information:  Referring Practitioner: Dr. Camille Petty  Has the plan of care been signed (Y/N):        []  Yes  [x]  No     Date of Patient follow up with Physician: not scheduled    Is this a Progress Report:     []  Yes  [x]  No     If Yes:  Date Range for reporting period:  Beginning:  ------------ Ending:     Progress report will be due (10 Rx or 30 days whichever is less): 22 NEXT VISIT    Recertification will be due (POC Duration  / 90 days whichever is less): 22      Visit # Insurance Allowable Auth Required   In Person 11 12 visits (3/22 - ) [x]  Yes     []  No    Tele Health   []  Yes     []  No    Total 11       Functional Scale: UEFI: NV    Date assessed:  NV      Latex Allergy:  [x]NO      []YES  Preferred Language for Healthcare:   [x]English       []other:    Pain level:  0/10 neck, 0/10 shoulder blade region    SUBJECTIVE:  Pt reports she feels mostly tightness right now. Pt has had 1 migraine since last visit. Pt states she has been able to manage some of her pain by self massage with cork balls.      Note: pt contacted insurance and DN is covered and no pre-auth is required    OBJECTIVE:    Observation:     Test measurements:      RESTRICTIONS/PRECAUTIONS: none    Exercises/Interventions:   Therapeutic Ex (45232) Sets/sec Reps Notes/CUES HEP   Row 2 10 Black TB, Tried ext (? pain) x   IR/ER  No money 2  3 10  10 GTB  GTB X (blue)   pec S at wall 20\" 3  x   Open book 1, hold 5 sec 10  x   horiz ABD 3 10 RTB x   3\" 10 NV x   LS S10\" 5 To R only x Pt edu: low back ex (child's pose, SKTC, piriformis S, cat/camel)   Note that pt uses lacrosse ball at home for self massage     Manual Intervention ([id number])            SOR,     SL scap mobs, STM kj scap (LS) x8'  Radiating pain into head and neck, tingling into hand with pressure to LS, rhomboid/mid trap    STM upper trap, cervical paraspinals x8'      Hawks  x8'  sitting           NMR re-education (11398)   CUES NEEDED                                                                   Therapeutic Activity (13237)                                          [employer] access code:    [email or web address]           Therapeutic Exercise and NMR EXR  [x] (07400) Provided verbal/tactile cueing for activities related to strengthening, flexibility, endurance, ROM  for improvements in scapular, scapulothoracic and UE control with self care, reaching, carrying, lifting, house/yardwork, driving/computer work.    [] (09964) Provided verbal/tactile cueing for activities related to improving balance, coordination, kinesthetic sense, posture, motor skill, proprioception  to assist with  scapular, scapulothoracic and UE control with self care, reaching, carrying, lifting, house/yardwork, driving/computer work. Therapeutic Activities:    [] (56892 or 28103) Provided verbal/tactile cueing for activities related to improving balance, coordination, kinesthetic sense, posture, motor skill, proprioception and motor activation to allow for proper function of scapular, scapulothoracic and UE control with self care, carrying, lifting, driving/computer work.      Home Exercise Program:    [x] (33117) Reviewed/Progressed HEP activities related to strengthening, flexibility, endurance, ROM of scapular, scapulothoracic and UE control with self care, reaching, carrying, lifting, house/yardwork, driving/computer work  [] (78113) Reviewed/Progressed HEP activities related to improving balance, coordination, kinesthetic sense, posture, motor skill, proprioception of scapular, scapulothoracic and UE control with self care, reaching, carrying, lifting, house/yardwork, driving/computer work      Manual Treatments:  PROM / STM / Oscillations-Mobs:  G-I, II, III, IV (Marta, Inf., Post.)  [x] (37870) Provided manual therapy to mobilize soft tissue/joints of cervical/CT, scapular GHJ and UE for the purpose of modulating pain, promoting relaxation,  increasing ROM, reducing/eliminating soft tissue swelling/inflammation/restriction, improving soft tissue extensibility and allowing for proper ROM for normal function with self care, reaching, carrying, lifting, house/yardwork, driving/computer work    Modalities:  Electrical Stimulation for pain control, swelling reduction. Waveform: premod; Cycle Time: Continuous; Frequency: 100 pps; Polarity: Negative; Ramp: 2 sec; Amplitude: 7.4 Volts Treatment time: 10 min. [] GAME READY (VASO)- for significant edema, swelling, pain control. Charges:  Timed Code Treatment Minutes: 45   Total Treatment Minutes:  45      [] EVAL (LOW) 62399 (typically 20 minutes face-to-face)  [] EVAL (MOD) 73735 (typically 30 minutes face-to-face)  [] EVAL (HIGH) 45539 (typically 45 minutes face-to-face)  [] RE-EVAL     [x] GA(24576) x 1   [] IONTO  [] NMR (37546) x     [] VASO  [x] Manual (03585) x  2   [] Other:  [] TA x      [] Mech Traction (36124)  [] ES(attended) (90127)      [] ES (un) (12819):    ASSESSMENT:  Pt reports overall less pain and she can manage some of her pain with self massage techniques. Overall pain is less and mainly tightness in L shoulder blade musculature. Pt exhibits improving strength and will benefit from progressive strengthening. GOALS:     Patient stated goal: relieve some of the pain, be able to continue my new workout     Therapist goals for Patient:   Short Term Goals: To be achieved in: 2 weeks  1. Independent in HEP and progression per patient tolerance, in order to prevent re-injury.    []? Progressing: []? Met: []? Not Met: []? Adjusted      2. Patient will have a decrease in pain to facilitate improvement in movement, function, and ADLs as indicated by Functional Deficits. []? Progressing: []? Met: []? Not Met: []? Adjusted      Long Term Goals: To be achieved in: 12 weeks  1. Disability index score of 10% or less for the DASH to assist with reaching prior level of function. []? Progressing: []? Met: []? Not Met: []? Adjusted      2. Patient will demonstrate increased AROM to WNL to allow for proper joint functioning as indicated by patients Functional Deficits. []? Progressing: []? Met: []? Not Met: []? Adjusted      3. Patient will demonstrate an increase in Strength to 5/5 to allow for proper functional mobility as indicated by patients Functional Deficits. []? Progressing: []? Met: []? Not Met: []? Adjusted      4. Patient will return to all functional activities without increased symptoms or restriction. []? Progressing: []? Met: []? Not Met: []? Adjusted      5. Pt will exhibit self(patient specific functional goal)    []? Progressing: []? Met: []? Not Met: []? Adjusted        Access Code: [email or web address]  URL: [email or web address]  Date: 03/14/2022  Prepared by: Ivan Sequeira    Exercises  Standing Pec Stretch at [place] - 1 x daily - 7 x weekly - 1 sets - 3 reps - 20 sec hold  Standing Bilateral Low Shoulder Row with Anchored Resistance - 1 x daily - 7 x weekly - 3 sets - 10 reps  Standing Shoulder Internal Rotation with Anchored Resistance - 1 x daily - 7 x weekly - 3 sets - 10 reps  Shoulder External Rotation with Anchored Resistance - 1 x daily - 7 x weekly - 3 sets - 10 reps      Overall Progression Towards Functional goals/ Treatment Progress Update:  [x] Patient is progressing as expected towards functional goals listed. [] Progression is slowed due to complexities/Impairments listed. [] Progression has been slowed due to co-morbidities.   [] Plan just implemented, too soon to assess goals progression <30days   [] Goals require adjustment due to lack of progress  [] Patient is not progressing as expected and requires additional follow up with physician  [] Other    Prognosis for POC: [x] Good [] Fair  [] Poor      Patient requires continued skilled intervention: [x] Yes  [] No    Treatment/Activity Tolerance:  [x] Patient able to complete treatment  [] Patient limited by fatigue  [] Patient limited by pain    [] Patient limited by other medical complications  [] Other:       PLAN: See eval  [x] Continue per plan of care [] Alter current plan (see comments above)  [] Plan of care initiated [] Hold pending MD visit [] Discharge    Electronically signed by:  Stephanie Ayala PT    Note: If patient does not return for scheduled/ recommended follow up visits, this note will serve as a discharge from care along with most recent update on progress.

## 2022-06-15 NOTE — TELEPHONE ENCOUNTER
From: Nita Ponce  To: Dr. Freeman Osmany: 6/15/2022 8:38 AM EDT  Subject: Back PT    Hi Dr. Dafne Castellon,    Can you send in the referral for the PT to [facility]? She still hasnt received it. Thank you!   Vincent Leonard

## 2022-06-22 ENCOUNTER — NURSE ONLY (OUTPATIENT)
Dept: ENT CLINIC | Age: 27
End: 2022-06-22
Payer: COMMERCIAL

## 2022-06-22 DIAGNOSIS — J30.9 ALLERGIC RHINITIS, UNSPECIFIED SEASONALITY, UNSPECIFIED TRIGGER: ICD-10-CM

## 2022-06-22 PROCEDURE — 95117 IMMUNOTHERAPY INJECTIONS: CPT | Performed by: OTOLARYNGOLOGY

## 2022-06-22 NOTE — PROGRESS NOTES
Correct serum vials verified by patient and nurse. After obtaining consent, and per orders of Dr Estrella Franco, injections of allergy serum given by MADHAV Faria RN. Patient instructed to remain in clinic for 30 minutes after injection, and to report any adverse reactions to me immediately. No change in patient status post injection.

## 2022-06-24 ENCOUNTER — HOSPITAL ENCOUNTER (OUTPATIENT)
Dept: PHYSICAL THERAPY | Age: 27
Setting detail: THERAPIES SERIES
Discharge: HOME | End: 2022-06-24
Payer: COMMERCIAL

## 2022-06-24 PROCEDURE — G0283 ELEC STIM OTHER THAN WOUND: HCPCS

## 2022-06-24 PROCEDURE — 97110 THERAPEUTIC EXERCISES: CPT

## 2022-06-24 PROCEDURE — 97140 MANUAL THERAPY 1/> REGIONS: CPT

## 2022-06-24 RX ORDER — CELECOXIB 200 MG/1
CAPSULE ORAL
Qty: 30 CAPSULE | Refills: 1 | Status: SHIPPED | OUTPATIENT
Start: 2022-06-24 | End: 2022-06-29

## 2022-06-24 NOTE — FLOWSHEET NOTE
[facility]      Physical Therapy Treatment Note/ Progress Report:     Date:  2022    Patient Name:  Sugar Gimenez    :  1995  MRN: [id number]  Restrictions/Precautions:    Medical/Treatment Diagnosis Information:  · Diagnosis: M25.512 L shoulder pain  · Treatment Diagnosis: M25.512 L shoulder pain  Insurance/Certification information:   [facility]  Physician Information:  Referring Practitioner: Dr. Jurgen Mueller  Has the plan of care been signed (Y/N):        []  Yes  [x]  No     Date of Patient follow up with Physician: not scheduled    Is this a Progress Report:     []  Yes  [x]  No     If Yes:  Date Range for reporting period:  Beginning:  ------------ Ending:     Progress report will be due (10 Rx or 30 days whichever is less): 22 NEXT VISIT    Recertification will be due (POC Duration  / 90 days whichever is less): 22      Visit # Insurance Allowable Auth Required   In Person 12 12 visits (3/22 - ) [x]  Yes     []  No    Tele Health   []  Yes     []  No    Total 12       Functional Scale: UEFI: NV    Date assessed:  NV      Latex Allergy:  [x]NO      []YES  Preferred Language for Healthcare:   [x]English       []other:    Pain level:  4/10 neck, 4/10 shoulder blade region    SUBJECTIVE:  Pt has had a rough week with pain and tingling into LUE. Pt notes pain has been worse lately.       Note: pt contacted insurance and DN is covered and no pre-auth is required    OBJECTIVE:    Observation:     Test measurements:  Cervical flex: 38 ° p! , cervical ext 35 ° p!, SB R 20 ° , SB L 35 °     RESTRICTIONS/PRECAUTIONS: none    Exercises/Interventions:   Therapeutic Ex (45758) Sets/sec Reps Notes/CUES HEP   Row 2 10 Black TB, Tried ext (? pain) x   IR/ER  No money 2  3 10  10 GTB  GTB X (blue)   pec S at wall 20\" 3  x   Open book 1, hold 5 sec 10  x   horiz ABD 3 10 RTB x   3\" 10 NV x   LS S10\" 5 To R only x Pt edu: low back ex (child's pose, SKTC, piriformis S, cat/camel)   Note that pt uses lacrosse ball at home for self massage     Manual Intervention ([id number])            SOR,     SL scap mobs, STM kj scap (LS) x8'  Radiating pain into head and neck, tingling into hand with pressure to LS, rhomboid/mid trap    STM upper trap, cervical paraspinals x8'      Hawks  x8'  sitting           NMR re-education (97231)   CUES NEEDED                                                                   Therapeutic Activity (46880)                                          [employer] access code:    [email or web address]           Therapeutic Exercise and NMR EXR  [x] (15928) Provided verbal/tactile cueing for activities related to strengthening, flexibility, endurance, ROM  for improvements in scapular, scapulothoracic and UE control with self care, reaching, carrying, lifting, house/yardwork, driving/computer work.    [] (17531) Provided verbal/tactile cueing for activities related to improving balance, coordination, kinesthetic sense, posture, motor skill, proprioception  to assist with  scapular, scapulothoracic and UE control with self care, reaching, carrying, lifting, house/yardwork, driving/computer work. Therapeutic Activities:    [] (65524 or 12724) Provided verbal/tactile cueing for activities related to improving balance, coordination, kinesthetic sense, posture, motor skill, proprioception and motor activation to allow for proper function of scapular, scapulothoracic and UE control with self care, carrying, lifting, driving/computer work.      Home Exercise Program:    [x] (40205) Reviewed/Progressed HEP activities related to strengthening, flexibility, endurance, ROM of scapular, scapulothoracic and UE control with self care, reaching, carrying, lifting, house/yardwork, driving/computer work  [] (43563) Reviewed/Progressed HEP activities related to improving balance, coordination, kinesthetic sense, posture, motor skill, proprioception of scapular, scapulothoracic and UE control with self care, reaching, carrying, lifting, house/yardwork, driving/computer work      Manual Treatments:  PROM / STM / Oscillations-Mobs:  G-I, II, III, IV (PA's, Inf., Post.)  [x] (61196) Provided manual therapy to mobilize soft tissue/joints of cervical/CT, scapular GHJ and UE for the purpose of modulating pain, promoting relaxation,  increasing ROM, reducing/eliminating soft tissue swelling/inflammation/restriction, improving soft tissue extensibility and allowing for proper ROM for normal function with self care, reaching, carrying, lifting, house/yardwork, driving/computer work    Modalities:  Electrical Stimulation for pain control, swelling reduction. Waveform: premod; Cycle Time: Continuous; Frequency: 100 pps; Polarity: Negative; Ramp: 2 sec; Amplitude: 7.4 Volts Treatment time: 10 min. [] GAME READY (VASO)- for significant edema, swelling, pain control. Charges:  Timed Code Treatment Minutes: 45   Total Treatment Minutes:  45      [] EVAL (LOW) 97508 (typically 20 minutes face-to-face)  [] EVAL (MOD) 89651 (typically 30 minutes face-to-face)  [] EVAL (HIGH) 06039 (typically 45 minutes face-to-face)  [] RE-EVAL     [x] NX(52792) x 1   [] IONTO  [] NMR (02532) x     [] VASO  [x] Manual (44956) x  2   [] Other:  [] TA x      [] Mech Traction (75774)  [] ES(attended) (42224)      [x] ES (un) (61216):    ASSESSMENT:  Pt reports overall less pain and she can manage some of her pain with self massage techniques. Overall pain is less and mainly tightness in L shoulder blade musculature. Pt exhibits improving strength and will benefit from progressive strengthening. GOALS:     Patient stated goal: relieve some of the pain, be able to continue my new workout     Therapist goals for Patient:   Short Term Goals: To be achieved in: 2 weeks  1. Independent in HEP and progression per patient tolerance, in order to prevent re-injury. []?  Progressing: []? Met: []? Not Met: []? Adjusted      2. Patient will have a decrease in pain to facilitate improvement in movement, function, and ADLs as indicated by Functional Deficits. []? Progressing: []? Met: []? Not Met: []? Adjusted      Long Term Goals: To be achieved in: 12 weeks  1. Disability index score of 10% or less for the DASH to assist with reaching prior level of function. []? Progressing: []? Met: []? Not Met: []? Adjusted      2. Patient will demonstrate increased AROM to WNL to allow for proper joint functioning as indicated by patients Functional Deficits. []? Progressing: []? Met: []? Not Met: []? Adjusted      3. Patient will demonstrate an increase in Strength to 5/5 to allow for proper functional mobility as indicated by patients Functional Deficits. []? Progressing: []? Met: []? Not Met: []? Adjusted      4. Patient will return to all functional activities without increased symptoms or restriction. []? Progressing: []? Met: []? Not Met: []? Adjusted      5. Pt will exhibit self(patient specific functional goal)    []? Progressing: []? Met: []? Not Met: []? Adjusted        Access Code: [email or web address]  URL: [email or web address]  Date: 03/14/2022  Prepared by: Yolie Merritt    Exercises  Standing Pec Stretch at [place] - 1 x daily - 7 x weekly - 1 sets - 3 reps - 20 sec hold  Standing Bilateral Low Shoulder Row with Anchored Resistance - 1 x daily - 7 x weekly - 3 sets - 10 reps  Standing Shoulder Internal Rotation with Anchored Resistance - 1 x daily - 7 x weekly - 3 sets - 10 reps  Shoulder External Rotation with Anchored Resistance - 1 x daily - 7 x weekly - 3 sets - 10 reps      Overall Progression Towards Functional goals/ Treatment Progress Update:  [x] Patient is progressing as expected towards functional goals listed. [] Progression is slowed due to complexities/Impairments listed. [] Progression has been slowed due to co-morbidities.   [] Plan just implemented, too soon to assess goals progression <30days   [] Goals require adjustment due to lack of progress  [] Patient is not progressing as expected and requires additional follow up with physician  [] Other    Prognosis for POC: [x] Good [] Fair  [] Poor      Patient requires continued skilled intervention: [x] Yes  [] No    Treatment/Activity Tolerance:  [x] Patient able to complete treatment  [] Patient limited by fatigue  [] Patient limited by pain    [] Patient limited by other medical complications  [] Other:       PLAN: See eval  [x] Continue per plan of care [] Alter current plan (see comments above)  [] Plan of care initiated [] Hold pending MD visit [] Discharge    Electronically signed by:  Augusta Ndiaey PT    Note: If patient does not return for scheduled/ recommended follow up visits, this note will serve as a discharge from care along with most recent update on progress.

## 2022-06-24 NOTE — TELEPHONE ENCOUNTER
Medication:   Requested Prescriptions     Pending Prescriptions Disp Refills    celecoxib (CELEBREX) 200 MG capsule [Pharmacy Med Name: CELECOXIB 200 MG CAPSULE] 30 capsule 1     Sig: TAKE 1 CAPSULE BY MOUTH EVERY DAY     Last Filled:  4/26/22    Last appt: 5/9/2022   Next appt: 6/29/2022

## 2022-06-27 ENCOUNTER — NURSE ONLY (OUTPATIENT)
Dept: ENT CLINIC | Age: 27
End: 2022-06-27
Payer: COMMERCIAL

## 2022-06-27 DIAGNOSIS — J30.9 ALLERGIC RHINITIS, UNSPECIFIED SEASONALITY, UNSPECIFIED TRIGGER: ICD-10-CM

## 2022-06-27 PROCEDURE — 95117 IMMUNOTHERAPY INJECTIONS: CPT | Performed by: OTOLARYNGOLOGY

## 2022-06-27 RX ORDER — TOPIRAMATE 100 MG/1
TABLET, FILM COATED ORAL
Qty: 180 TABLET | Refills: 0 | Status: SHIPPED | OUTPATIENT
Start: 2022-06-27 | End: 2022-06-29 | Stop reason: ALTCHOICE

## 2022-06-27 NOTE — PROGRESS NOTES
Small local reaction on both arms after last injection. No systemic symptoms. Will repeat dose this week. Correct serum vials verified by patient and nurse. After obtaining consent, and per orders of Dr Carolyn Rooney, injections of allergy serum given by MADHAV Faria RN. Patient instructed to remain in clinic for 30 minutes after injection, and to report any adverse reactions to me immediately. No change in patient status post injection.

## 2022-06-27 NOTE — TELEPHONE ENCOUNTER
Medication:   Requested Prescriptions     Pending Prescriptions Disp Refills    topiramate (TOPAMAX) 100 MG tablet [Pharmacy Med Name: TOPIRAMATE 100 MG TABLET] 180 tablet 0     Sig: TAKE 1 TABLET BY MOUTH TWICE A DAY     Last Filled: 2.8.2022   Last appt: 5/9/2022   Next appt: 6/29/2022    Last OARRS: No flowsheet data found.

## 2022-06-29 ENCOUNTER — OFFICE VISIT (OUTPATIENT)
Dept: PRIMARY CARE CLINIC | Age: 27
End: 2022-06-29
Payer: COMMERCIAL

## 2022-06-29 ENCOUNTER — HOSPITAL ENCOUNTER (OUTPATIENT)
Dept: PHYSICAL THERAPY | Age: 27
Setting detail: THERAPIES SERIES
Discharge: HOME | End: 2022-06-29
Payer: COMMERCIAL

## 2022-06-29 VITALS
SYSTOLIC BLOOD PRESSURE: 130 MMHG | BODY MASS INDEX: 33.69 KG/M2 | HEART RATE: 87 BPM | HEIGHT: 60 IN | OXYGEN SATURATION: 99 % | WEIGHT: 171.6 LBS | DIASTOLIC BLOOD PRESSURE: 84 MMHG | TEMPERATURE: 97.3 F

## 2022-06-29 DIAGNOSIS — F90.9 ATTENTION DEFICIT HYPERACTIVITY DISORDER (ADHD), UNSPECIFIED ADHD TYPE: ICD-10-CM

## 2022-06-29 DIAGNOSIS — F41.8 ANXIETY WITH DEPRESSION: Primary | ICD-10-CM

## 2022-06-29 DIAGNOSIS — E28.2 PCOS (POLYCYSTIC OVARIAN SYNDROME): ICD-10-CM

## 2022-06-29 PROCEDURE — G8417 CALC BMI ABV UP PARAM F/U: HCPCS | Performed by: FAMILY MEDICINE

## 2022-06-29 PROCEDURE — G8427 DOCREV CUR MEDS BY ELIG CLIN: HCPCS | Performed by: FAMILY MEDICINE

## 2022-06-29 PROCEDURE — G0283 ELEC STIM OTHER THAN WOUND: HCPCS

## 2022-06-29 PROCEDURE — 97140 MANUAL THERAPY 1/> REGIONS: CPT

## 2022-06-29 PROCEDURE — 97110 THERAPEUTIC EXERCISES: CPT

## 2022-06-29 PROCEDURE — 99213 OFFICE O/P EST LOW 20 MIN: CPT | Performed by: FAMILY MEDICINE

## 2022-06-29 PROCEDURE — 1036F TOBACCO NON-USER: CPT | Performed by: FAMILY MEDICINE

## 2022-06-29 RX ORDER — SUMATRIPTAN 100 MG/1
100 TABLET, FILM COATED ORAL
COMMUNITY

## 2022-06-29 ASSESSMENT — PATIENT HEALTH QUESTIONNAIRE - PHQ9
SUM OF ALL RESPONSES TO PHQ9 QUESTIONS 1 & 2: 0
5. POOR APPETITE OR OVEREATING: 0
SUM OF ALL RESPONSES TO PHQ QUESTIONS 1-9: 0
2. FEELING DOWN, DEPRESSED OR HOPELESS: 0
6. FEELING BAD ABOUT YOURSELF - OR THAT YOU ARE A FAILURE OR HAVE LET YOURSELF OR YOUR FAMILY DOWN: 0
SUM OF ALL RESPONSES TO PHQ QUESTIONS 1-9: 0
3. TROUBLE FALLING OR STAYING ASLEEP: 0
10. IF YOU CHECKED OFF ANY PROBLEMS, HOW DIFFICULT HAVE THESE PROBLEMS MADE IT FOR YOU TO DO YOUR WORK, TAKE CARE OF THINGS AT HOME, OR GET ALONG WITH OTHER PEOPLE: 0
9. THOUGHTS THAT YOU WOULD BE BETTER OFF DEAD, OR OF HURTING YOURSELF: 0
SUM OF ALL RESPONSES TO PHQ QUESTIONS 1-9: 0
SUM OF ALL RESPONSES TO PHQ QUESTIONS 1-9: 0
1. LITTLE INTEREST OR PLEASURE IN DOING THINGS: 0
4. FEELING TIRED OR HAVING LITTLE ENERGY: 0
7. TROUBLE CONCENTRATING ON THINGS, SUCH AS READING THE NEWSPAPER OR WATCHING TELEVISION: 0
8. MOVING OR SPEAKING SO SLOWLY THAT OTHER PEOPLE COULD HAVE NOTICED. OR THE OPPOSITE, BEING SO FIGETY OR RESTLESS THAT YOU HAVE BEEN MOVING AROUND A LOT MORE THAN USUAL: 0

## 2022-06-29 ASSESSMENT — ENCOUNTER SYMPTOMS
RESPIRATORY NEGATIVE: 1
GASTROINTESTINAL NEGATIVE: 1
EYES NEGATIVE: 1

## 2022-06-29 NOTE — FLOWSHEET NOTE
[facility]      Physical Therapy Treatment Note/ Progress Report:     Date:  2022    Patient Name:  Tyrone Briones    :  1995  MRN: [id number]  Restrictions/Precautions:    Medical/Treatment Diagnosis Information:  · Diagnosis: M25.512 L shoulder pain  · Treatment Diagnosis: M25.512 L shoulder pain  Insurance/Certification information:   [facility]  Physician Information:  Referring Practitioner: Dr. Marry Hoffmann  Has the plan of care been signed (Y/N):        []  Yes  [x]  No     Date of Patient follow up with Physician: not scheduled    Is this a Progress Report:     []  Yes  [x]  No     If Yes:  Date Range for reporting period:  Beginning:  ------------ Ending:     Progress report will be due (10 Rx or 30 days whichever is less): 22 NEXT VISIT    Recertification will be due (POC Duration  / 90 days whichever is less): 22      Visit # Insurance Allowable Auth Required   In Person 1 6 visits ( - ) [x]  Yes     []  No    Tele Health   []  Yes     []  No    Total 13 total       Functional Scale: UEFI: NV    Date assessed:  NV      Latex Allergy:  [x]NO      []YES  Preferred Language for Healthcare:   [x]English       []other:    Pain level:  3/10 neck, 3-4/10 shoulder blade region    SUBJECTIVE:  Pt continues to report ? shoulder pain/shoulder blade pain as well as more continuous and constant numbness into L hand. Pt does report no migraines over the last few days.      Note: pt contacted insurance and DN is covered and no pre-auth is required    OBJECTIVE:    Observation:     Test measurements:      RESTRICTIONS/PRECAUTIONS: none    Exercises/Interventions:   Therapeutic Ex (35289) Sets/sec Reps Notes/CUES HEP   Row 3 10 Black TB, Tried ext (? pain) x   IR/ER  No money 3  3 10  10 GTB  GTB X (blue)   pec S at wall 20\" 3  x   Open book 1, hold 5 sec 10  x   horiz ABD 3 10 RTB x   Chin tuck5\" 2x10  x   LS S10\" 5 To R only x Note that pt uses lacrosse ball at home for self massage     Manual Intervention (57569)            SOR, manual tractionx3'        Radiating pain into head and neck, tingling into hand with pressure to LS, rhomboid/mid trap    STM upper trap, cervical paraspinals x8'  Note significant tightness lateral L neck    Hawks  x8'  sitting    Thoracic manip x1  Cavitation noted    NMR re-education (61437)   CUES NEEDED                                                                   Therapeutic Activity (82534)                                          [employer] access code:    [email or web address]           Therapeutic Exercise and NMR EXR  [x] (76179) Provided verbal/tactile cueing for activities related to strengthening, flexibility, endurance, ROM  for improvements in scapular, scapulothoracic and UE control with self care, reaching, carrying, lifting, house/yardwork, driving/computer work.    [] (65964) Provided verbal/tactile cueing for activities related to improving balance, coordination, kinesthetic sense, posture, motor skill, proprioception  to assist with  scapular, scapulothoracic and UE control with self care, reaching, carrying, lifting, house/yardwork, driving/computer work. Therapeutic Activities:    [] (39685 or 65430) Provided verbal/tactile cueing for activities related to improving balance, coordination, kinesthetic sense, posture, motor skill, proprioception and motor activation to allow for proper function of scapular, scapulothoracic and UE control with self care, carrying, lifting, driving/computer work.      Home Exercise Program:    [x] (53554) Reviewed/Progressed HEP activities related to strengthening, flexibility, endurance, ROM of scapular, scapulothoracic and UE control with self care, reaching, carrying, lifting, house/yardwork, driving/computer work  [] (12801) Reviewed/Progressed HEP activities related to improving balance, coordination, kinesthetic sense, posture, motor skill, proprioception of scapular, scapulothoracic and UE control with self care, reaching, carrying, lifting, house/yardwork, driving/computer work      Manual Treatments:  PROM / STM / Oscillations-Mobs:  G-I, II, III, IV (Marta, Inf., Post.)  [x] (78347) Provided manual therapy to mobilize soft tissue/joints of cervical/CT, scapular GHJ and UE for the purpose of modulating pain, promoting relaxation,  increasing ROM, reducing/eliminating soft tissue swelling/inflammation/restriction, improving soft tissue extensibility and allowing for proper ROM for normal function with self care, reaching, carrying, lifting, house/yardwork, driving/computer work    Modalities:  Electrical Stimulation for pain control, swelling reduction. Waveform: premod; Cycle Time: Continuous; Frequency: 100 pps; Polarity: Negative; Ramp: 2 sec; Amplitude: 7.4 Volts Treatment time: 10 min. [] GAME READY (VASO)- for significant edema, swelling, pain control. Charges:  Timed Code Treatment Minutes: 45   Total Treatment Minutes:  55      [] EVAL (LOW) 29107 (typically 20 minutes face-to-face)  [] EVAL (MOD) 56520 (typically 30 minutes face-to-face)  [] EVAL (HIGH) 53634 (typically 45 minutes face-to-face)  [] RE-EVAL     [x] WN(99770) x 1   [] IONTO  [] NMR (52868) x     [] VASO  [x] Manual (95056) x  2   [] Other:  [] TA x      [] Mech Traction (50648)  [] ES(attended) (78233)      [x] ES (un) (86006):    ASSESSMENT:  Unfortunately pt has noted ? shoulder pain over the last 2 weeks with unknown cause. Pt continues to exhibit all L sided symptoms with radiating pain into hand and into neck. Pt has on and off again migraines, definitely with a MSK component. Pt continues to exhibit significant tightness in lateral L knee and L shoulder blade musculature. Pt will continue to benefit from ? strength in neck flexors and kj scap musculature, ? scap mobility, and improved posture to ? radiating symptoms and shoulder pain.      GOALS: Patient stated goal: relieve some of the pain, be able to continue my new workout     Therapist goals for Patient:   Short Term Goals: To be achieved in: 2 weeks  1. Independent in HEP and progression per patient tolerance, in order to prevent re-injury. []? Progressing: []? Met: []? Not Met: []? Adjusted      2. Patient will have a decrease in pain to facilitate improvement in movement, function, and ADLs as indicated by Functional Deficits. []? Progressing: []? Met: []? Not Met: []? Adjusted      Long Term Goals: To be achieved in: 12 weeks  1. Disability index score of 10% or less for the DASH to assist with reaching prior level of function. []? Progressing: []? Met: []? Not Met: []? Adjusted      2. Patient will demonstrate increased AROM to WNL to allow for proper joint functioning as indicated by patients Functional Deficits. []? Progressing: []? Met: []? Not Met: []? Adjusted      3. Patient will demonstrate an increase in Strength to 5/5 to allow for proper functional mobility as indicated by patients Functional Deficits. []? Progressing: []? Met: []? Not Met: []? Adjusted      4. Patient will return to all functional activities without increased symptoms or restriction. []? Progressing: []? Met: []? Not Met: []? Adjusted      5. Pt will exhibit self(patient specific functional goal)    []? Progressing: []? Met: []? Not Met: []? Adjusted        Access Code: [email or web address]  URL: [email or web address]  Date: 03/14/2022  Prepared by: Josefa Diaz    Exercises  Standing Pec Stretch at [place] - 1 x daily - 7 x weekly - 1 sets - 3 reps - 20 sec hold  Standing Bilateral Low Shoulder Row with Anchored Resistance - 1 x daily - 7 x weekly - 3 sets - 10 reps  Standing Shoulder Internal Rotation with Anchored Resistance - 1 x daily - 7 x weekly - 3 sets - 10 reps  Shoulder External Rotation with Anchored Resistance - 1 x daily - 7 x weekly - 3 sets - 10 reps      Overall Progression Towards Functional goals/ Treatment Progress Update:  [x] Patient is progressing as expected towards functional goals listed. [] Progression is slowed due to complexities/Impairments listed. [] Progression has been slowed due to co-morbidities. [] Plan just implemented, too soon to assess goals progression <30days   [] Goals require adjustment due to lack of progress  [] Patient is not progressing as expected and requires additional follow up with physician  [] Other    Prognosis for POC: [x] Good [] Fair  [] Poor      Patient requires continued skilled intervention: [x] Yes  [] No    Treatment/Activity Tolerance:  [x] Patient able to complete treatment  [] Patient limited by fatigue  [] Patient limited by pain    [] Patient limited by other medical complications  [] Other:       PLAN: See eval  [x] Continue per plan of care [] Alter current plan (see comments above)  [] Plan of care initiated [] Hold pending MD visit [] Discharge    Electronically signed by:  Cole Deleon PT    Note: If patient does not return for scheduled/ recommended follow up visits, this note will serve as a discharge from care along with most recent update on progress.

## 2022-06-29 NOTE — PROGRESS NOTES
SUBJECTIVE:  Patient ID: Tyrone Briones is a 32 y.o. y.o. female     HPI   Patient is here for follow-up on anxiety depression she has been seen by psychiatry she is on Adderall feeling well she has 1 side gene testing she needs a copy to take with her she is currently on no SSRI or SNRI feels well  Patient with migraine stable on current medication she is doing physical therapy to her neck and shoulder and this seems to help much  Patient with PCOS on metformin managed by her GYN  Past Medical History:   Diagnosis Date    Anxiety     Atypical migraine     Bipolar 2 disorder ([facility]     [facility]     Depression     Gestational diabetes     H/O drug abuse ([facility]     hx of heroin/cocaine    IBS (irritable bowel syndrome)     Obese     PCOS (polycystic ovarian syndrome)       Past Surgical History:   Procedure Laterality Date    COLONOSCOPY      ENDOSCOPY, COLON, DIAGNOSTIC      SEPTOPLASTY N/A 7/26/2021    SPHENOIDOTOMY RIGHT, BILATERAL ANTERIOR ETHMOIDECTOMY, BILATERAL INFERIOR TURBINATE REDUCTION, BILATERAL MAXILLARY ANTROSTOMY AND SEPTOPLASTY, NASAL SCOPE performed by Karen Lam DO at [address] EXTRACTION       Family History   Problem Relation Age of Onset    Other Maternal Grandmother         demetia    Heart Disease Maternal Grandmother     High Cholesterol Maternal Grandfather     Diabetes Maternal Grandfather     High Blood Pressure Maternal Grandfather     Heart Disease Maternal Grandfather      Social History     Socioeconomic History    Marital status: Life Partner     Spouse name: Not on file    Number of children: Not on file    Years of education: Not on file    Highest education level: Not on file   Occupational History     Comment: Refused to answer   Tobacco Use    Smoking status: Never Smoker    Smokeless tobacco: Never Used   Vaping Use    Vaping Use: Never used   Substance and Sexual Activity    Alcohol use: Not Currently    Drug use: Not Currently     Comment: heroin-snort 5 years ago    Sexual activity: Yes     Partners: Male   Other Topics Concern    Not on file   Social History Narrative    Not on file     Social Determinants of Health     Financial Resource Strain: Low Risk     Difficulty of Paying Living Expenses: Not hard at all   Food Insecurity: No Food Insecurity    Worried About Running Out of Food in the Last Year: Never true    Mike of Food in the Last Year: Never true   Transportation Needs: No Transportation Needs    Lack of Transportation (Medical): No    Lack of Transportation (Non-Medical): No   Physical Activity: Sufficiently Active    Days of Exercise per Week: 6 days    Minutes of Exercise per Session: 40 min   Stress: No Stress Concern Present    Feeling of Stress : Not at all   Social Connections:  Moderately Isolated    Frequency of Communication with Friends and Family: Once a week    Frequency of Social Gatherings with Friends and Family: Twice a week    Attends [employer] Services: Never    Active Member of Clubs or Organizations: No    Attends Club or Organization Meetings: Never    Marital Status: Living with partner   Intimate Partner Violence:     Fear of Current or Ex-Partner: Not on file    Emotionally Abused: Not on file    Physically Abused: Not on file    Sexually Abused: Not on file   Housing Stability: [address] Unable to Pay for Housing in the Last Year: No    Number of [place] in the Last Year: 1    Unstable Housing in the Last Year: No     Current Outpatient Medications   Medication Sig Dispense Refill    SUMAtriptan (IMITREX) 100 MG tablet Take 100 mg by mouth once as needed for Migraine      methylphenidate (METADATE ER) 10 MG extended release tablet 15 mg.       methylphenidate (RITALIN) 5 MG tablet 10 mg.       cyclobenzaprine (FLEXERIL) 5 MG tablet Take 1 tab nightly and as needed      cetirizine (ZYRTEC) 10 MG tablet Take 10 mg by mouth daily      albuterol sulfate HFA (VENTOLIN HFA) 108 (90 Base) MCG/ACT inhaler Inhale 2 puffs into the lungs every 6 hours as needed for Wheezing or Shortness of Breath (cough) 54 g 1    EPINEPHrine (EPIPEN) 0.3 MG/0.3ML SOAJ injection Use as directed for allergic reaction 1 each 1    metFORMIN (GLUCOPHAGE) 500 MG tablet Take 500 mg by mouth daily (with breakfast) Take 500 mg in the morning and 1000 mg in the evenings      acyclovir (ZOVIRAX) 400 MG tablet Take 400 mg by mouth      acetaminophen (APAP EXTRA STRENGTH) 500 MG tablet Take 1 tablet by mouth every 6 hours as needed for Pain Alternate every 3 hours with ibuprofen 60 tablet 0     No current facility-administered medications for this visit. Allergies   Allergen Reactions    Nickel Rash     Skin began to excoriate    Lexapro [Escitalopram Oxalate]      Jittery and could not sleep       Review of Systems   Constitutional: Negative. HENT: Negative. Eyes: Negative. Respiratory: Negative. Cardiovascular: Negative. Gastrointestinal: Negative. All other systems reviewed and are negative. OBJECTIVE:  /84 (Site: Right Upper Arm, Position: Sitting, Cuff Size: Medium Adult)   Pulse 87   Temp 97.3 °F (36.3 °C) (Temporal)   Ht 5' (1.524 m)   Wt 171 lb 9.6 oz (77.8 kg)   SpO2 99%   BMI 33.51 kg/m²     Physical Exam  Vitals reviewed. Eyes:      General: No scleral icterus. Conjunctiva/sclera: Conjunctivae normal.   Neck:      Thyroid: No thyromegaly. Vascular: No JVD. Cardiovascular:      Rate and Rhythm: Normal rate and regular rhythm. Heart sounds: Normal heart sounds. No murmur heard. No friction rub. No gallop. Pulmonary:      Effort: Pulmonary effort is normal.      Breath sounds: Normal breath sounds. No wheezing or rales. Abdominal:      General: Bowel sounds are normal. There is no distension. Palpations: Abdomen is soft. There is no mass. Tenderness: There is no abdominal tenderness.       Hernia: No hernia is present. Lymphadenopathy:      Cervical: No cervical adenopathy. Skin:     Findings: No rash. Neurological:      Mental Status: She is alert and oriented to person, place, and time. ASSESSMENT:     Diagnosis Orders   1. Anxiety with depression     2. PCOS (polycystic ovarian syndrome)     3.  Attention deficit hyperactivity disorder (ADHD), unspecified ADHD type         PLAN:  Reviewed the one sight gene testing  Copy is given to take to her psychiatrist  Continue the same

## 2022-07-06 ENCOUNTER — HOSPITAL ENCOUNTER (OUTPATIENT)
Dept: PHYSICAL THERAPY | Age: 27
Setting detail: THERAPIES SERIES
Discharge: HOME | End: 2022-07-06
Payer: COMMERCIAL

## 2022-07-06 ENCOUNTER — NURSE ONLY (OUTPATIENT)
Dept: ENT CLINIC | Age: 27
End: 2022-07-06
Payer: COMMERCIAL

## 2022-07-06 DIAGNOSIS — J30.9 ALLERGIC RHINITIS, UNSPECIFIED SEASONALITY, UNSPECIFIED TRIGGER: ICD-10-CM

## 2022-07-06 PROCEDURE — 97140 MANUAL THERAPY 1/> REGIONS: CPT

## 2022-07-06 PROCEDURE — 95117 IMMUNOTHERAPY INJECTIONS: CPT | Performed by: OTOLARYNGOLOGY

## 2022-07-06 PROCEDURE — G0283 ELEC STIM OTHER THAN WOUND: HCPCS

## 2022-07-06 PROCEDURE — 97110 THERAPEUTIC EXERCISES: CPT

## 2022-07-06 NOTE — PROGRESS NOTES
Local reaction on both arms 20mm. Will repeat doses. Correct serum vials verified by patient and nurse. After obtaining consent, and per orders of Dr Kellie Buitrago, injections of allergy serum given by MADHAV Faria RN. Patient instructed to remain in clinic for 30 minutes after injection, and to report any adverse reactions to me immediately. No change in patient status post injection.

## 2022-07-06 NOTE — FLOWSHEET NOTE
[facility]      Physical Therapy Treatment Note/ Progress Report:     Date:  2022    Patient Name:  Jimmy Law    :  1995  MRN: [id number]  Restrictions/Precautions:    Medical/Treatment Diagnosis Information:  · Diagnosis: M25.512 L shoulder pain  · Treatment Diagnosis: M25.512 L shoulder pain  Insurance/Certification information:   [facility]  Physician Information:  Referring Practitioner: Dr. Jl To  Has the plan of care been signed (Y/N):        []  Yes  [x]  No     Date of Patient follow up with Physician: not scheduled    Is this a Progress Report:     []  Yes  [x]  No     If Yes:  Date Range for reporting period:  Beginning:  ------------ Ending:     Progress report will be due (10 Rx or 30 days whichever is less):     Recertification will be due (POC Duration  / 90 days whichever is less): 22      Visit # Insurance Allowable Auth Required   In Person 2 6 visits ( - ) [x]  Yes     []  No    Tele Health   []  Yes     []  No    Total 14 total       Functional Scale: UEFI: NV    Date assessed:  NV      Latex Allergy:  [x]NO      []YES  Preferred Language for Healthcare:   [x]English       []other:    Pain level:  2/10 neck, 2/10 shoulder blade region    SUBJECTIVE:  Pt reports overall improvement in neck and shoulder pain. Pain level remains fairly constant but better overall.      Note: pt contacted insurance and DN is covered and no pre-auth is required    OBJECTIVE:    Observation:     Test measurements:      RESTRICTIONS/PRECAUTIONS: none    Exercises/Interventions:   Therapeutic Ex (01061) Sets/sec Reps Notes/CUES HEP   Row 3 10 Black TB, Tried ext (? pain) x   IR/ER  No money 3  3 10  10 GTB  GTB X (blue)   pec S at wall 20\" 3  x   Open book 1, hold 5 sec 10 Less \"crunching\" this visit (after manual rx) x   horiz ABD 3 10 RTB x   Chin tuck5\" 2x10  x   LS S10\" 5 To R only x Note that pt uses lacrosse ball at home for self massage     Manual Intervention (76413)            SOR, manual tractionx3'        Radiating pain into head and neck, tingling into hand with pressure to LS, rhomboid/mid trap    STM upper trap, cervical paraspinals x8'  Note significant tightness lateral L neck    Hawks  x8'  sitting    Thoracic manip x1  Cavitation noted    NMR re-education (11737)   CUES NEEDED                                                                   Therapeutic Activity (21348)                                          [employer] access code:    [email or web address]           Therapeutic Exercise and NMR EXR  [x] (52756) Provided verbal/tactile cueing for activities related to strengthening, flexibility, endurance, ROM  for improvements in scapular, scapulothoracic and UE control with self care, reaching, carrying, lifting, house/yardwork, driving/computer work.    [] (72927) Provided verbal/tactile cueing for activities related to improving balance, coordination, kinesthetic sense, posture, motor skill, proprioception  to assist with  scapular, scapulothoracic and UE control with self care, reaching, carrying, lifting, house/yardwork, driving/computer work. Therapeutic Activities:    [] (01994 or 67834) Provided verbal/tactile cueing for activities related to improving balance, coordination, kinesthetic sense, posture, motor skill, proprioception and motor activation to allow for proper function of scapular, scapulothoracic and UE control with self care, carrying, lifting, driving/computer work.      Home Exercise Program:    [x] (92217) Reviewed/Progressed HEP activities related to strengthening, flexibility, endurance, ROM of scapular, scapulothoracic and UE control with self care, reaching, carrying, lifting, house/yardwork, driving/computer work  [] (43202) Reviewed/Progressed HEP activities related to improving balance, coordination, kinesthetic sense, posture, motor skill, proprioception of scapular, scapulothoracic and UE control with self care, reaching, carrying, lifting, house/yardwork, driving/computer work      Manual Treatments:  PROM / STM / Oscillations-Mobs:  G-I, II, III, IV (PA's, Inf., Post.)  [x] (67910) Provided manual therapy to mobilize soft tissue/joints of cervical/CT, scapular GHJ and UE for the purpose of modulating pain, promoting relaxation,  increasing ROM, reducing/eliminating soft tissue swelling/inflammation/restriction, improving soft tissue extensibility and allowing for proper ROM for normal function with self care, reaching, carrying, lifting, house/yardwork, driving/computer work    Modalities:  Electrical Stimulation for pain control, swelling reduction. Waveform: premod; Cycle Time: Continuous; Frequency: 100 pps; Polarity: Negative; Ramp: 2 sec; Amplitude: 7.4 Volts Treatment time: 10 min. [] GAME READY (VASO)- for significant edema, swelling, pain control. Charges:  Timed Code Treatment Minutes: 45   Total Treatment Minutes:  55      [] EVAL (LOW) 99444 (typically 20 minutes face-to-face)  [] EVAL (MOD) 58524 (typically 30 minutes face-to-face)  [] EVAL (HIGH) 01080 (typically 45 minutes face-to-face)  [] RE-EVAL     [x] KP(30609) x 1   [] IONTO  [] NMR (58912) x     [] VASO  [x] Manual (04519) x  2   [] Other:  [] TA x      [] Mech Traction (41224)  [] ES(attended) (55894)      [x] ES (un) (54070):    ASSESSMENT:  Pt reports improved symptoms again with ? constant pain and overall less radiating symptoms. Pt will continue to benefit from gross posture and scap strengthening. GOALS:     Patient stated goal: relieve some of the pain, be able to continue my new workout     Therapist goals for Patient:   Short Term Goals: To be achieved in: 2 weeks  1. Independent in HEP and progression per patient tolerance, in order to prevent re-injury. [x]? Progressing: []? Met: []? Not Met: []? Adjusted      2.  Patient will have a decrease in pain to facilitate improvement in movement, function, and ADLs as indicated by Functional Deficits. [x]? Progressing: []? Met: []? Not Met: []? Adjusted      Long Term Goals: To be achieved in: 12 weeks  1. Disability index score of 10% or less for the DASH to assist with reaching prior level of function. [x]? Progressing: []? Met: []? Not Met: []? Adjusted      2. Patient will demonstrate increased AROM to WNL to allow for proper joint functioning as indicated by patients Functional Deficits. [x]? Progressing: []? Met: []? Not Met: []? Adjusted      3. Patient will demonstrate an increase in Strength to 5/5 to allow for proper functional mobility as indicated by patients Functional Deficits. [x]? Progressing: []? Met: []? Not Met: []? Adjusted      4. Patient will return to all functional activities without increased symptoms or restriction. [x]? Progressing: []? Met: []? Not Met: []? Adjusted      5. Pt will exhibit self(patient specific functional goal)    [x]? Progressing: []? Met: []? Not Met: []? Adjusted        Access Code: [email or web address]  URL: [email or web address]  Date: 03/14/2022  Prepared by: Marquis Paige    Exercises  Standing Pec Stretch at [place] [place] - 1 x daily - 7 x weekly - 1 sets - 3 reps - 20 sec hold  Standing Bilateral Low Shoulder Row with Anchored Resistance - 1 x daily - 7 x weekly - 3 sets - 10 reps  Standing Shoulder Internal Rotation with Anchored Resistance - 1 x daily - 7 x weekly - 3 sets - 10 reps  Shoulder External Rotation with Anchored Resistance - 1 x daily - 7 x weekly - 3 sets - 10 reps      Overall Progression Towards Functional goals/ Treatment Progress Update:  [x] Patient is progressing as expected towards functional goals listed. [] Progression is slowed due to complexities/Impairments listed. [] Progression has been slowed due to co-morbidities.   [] Plan just implemented, too soon to assess goals progression <30days   [] Goals require adjustment due to lack of progress  [] Patient

## 2022-07-11 ENCOUNTER — NURSE ONLY (OUTPATIENT)
Dept: ENT CLINIC | Age: 27
End: 2022-07-11
Payer: COMMERCIAL

## 2022-07-11 DIAGNOSIS — J30.9 ALLERGIC RHINITIS, UNSPECIFIED SEASONALITY, UNSPECIFIED TRIGGER: ICD-10-CM

## 2022-07-11 PROCEDURE — 95117 IMMUNOTHERAPY INJECTIONS: CPT | Performed by: OTOLARYNGOLOGY

## 2022-07-11 NOTE — PROGRESS NOTES
Correct serum vials verified by patient and nurse. After obtaining consent, and per orders of Dr Ann Kan, injections of allergy serum given by MADHAV Faria RN. Patient instructed to remain in clinic for 30 minutes after injection, and to report any adverse reactions to me immediately. No change in patient status post injection.

## 2022-07-13 ENCOUNTER — HOSPITAL ENCOUNTER (OUTPATIENT)
Dept: PHYSICAL THERAPY | Age: 27
Setting detail: THERAPIES SERIES
Discharge: HOME | End: 2022-07-13
Payer: COMMERCIAL

## 2022-07-13 PROCEDURE — 97110 THERAPEUTIC EXERCISES: CPT

## 2022-07-13 PROCEDURE — 97140 MANUAL THERAPY 1/> REGIONS: CPT

## 2022-07-13 NOTE — FLOWSHEET NOTE
[facility]      Physical Therapy Treatment Note/ Progress Report:     Date:  2022    Patient Name:  Yovanny Gee    :  1995  MRN: [id number]  Restrictions/Precautions:    Medical/Treatment Diagnosis Information:  · Diagnosis: M25.512 L shoulder pain  · Treatment Diagnosis: M25.512 L shoulder pain  Insurance/Certification information:   [facility]  Physician Information:  Referring Practitioner: Dr. Love Gong  Has the plan of care been signed (Y/N):        []  Yes  [x]  No     Date of Patient follow up with Physician: not scheduled    Is this a Progress Report:     []  Yes  [x]  No     If Yes:  Date Range for reporting period:  Beginning:  ------------ Ending:     Progress report will be due (10 Rx or 30 days whichever is less): 3/75/90    Recertification will be due (POC Duration  / 90 days whichever is less): 22      Visit # Insurance Allowable Auth Required   In Person 3 6 visits ( - ) [x]  Yes     []  No    Tele Health   []  Yes     []  No    Total 15 total       Functional Scale: UEFI: NV    Date assessed:  NV      Latex Allergy:  [x]NO      []YES  Preferred Language for Healthcare:   [x]English       []other:    Pain level:  1/10 neck, 1/10 shoulder blade region    SUBJECTIVE:  Pt reports continued overall improvement in neck and shoulder pain. Pt notes if she feels N/T into arm it is only to the elbow now. Pt continues to experience L sided shoulder blade and neck pain that will cause a strange sensation into her ear at times. Pt had no HA this week. Pt also states she is doing chin tuck sitting and it feels good.      Note: pt contacted insurance and DN is covered and no pre-auth is required - NOTE REFERRAL FOR DN (Dr. Oscar Espinoza) NEXT VISIT    OBJECTIVE:    Observation:     Test measurements:      RESTRICTIONS/PRECAUTIONS: none    Exercises/Interventions:   Therapeutic Ex (20094) Sets/sec Reps Notes/CUES HEP   Row 3 10 Black TB, Tried ext (? pain) x   IR/ER  No money 3  3 10  10 GTB  GTB X (blue)   pec S at wall 20\" 3  x   Open book 1, hold 5 sec 10 Less \"crunching\" this visit (after manual rx) x   horiz ABD 3 10 GTB, ? pain in anterior shoulder x   Chin tuck5\" 2x10  x   LS S10\" 5 To R only x                                  Note that pt uses lacrosse ball at home for self massage     Manual Intervention ([id number])            SOR, manual tractionx3'     SL scap mobs, STM kj scap (LS) x8'  Radiating pain into head and neck, tingling into hand with pressure to LS, rhomboid/mid trap, able to lift shoulder blade away from rib cage this visit    STM upper trap, cervical paraspinals x8'  Note significant tightness lateral L neck    Hawks  x6'  sitting    Thoracic manip x1  Cavitation noted    NMR re-education (33255)   CUES NEEDED                                                                   Therapeutic Activity (24481)                                          [employer] access code:    [email or web address]           Therapeutic Exercise and NMR EXR  [x] (80407) Provided verbal/tactile cueing for activities related to strengthening, flexibility, endurance, ROM  for improvements in scapular, scapulothoracic and UE control with self care, reaching, carrying, lifting, house/yardwork, driving/computer work.    [] (42597) Provided verbal/tactile cueing for activities related to improving balance, coordination, kinesthetic sense, posture, motor skill, proprioception  to assist with  scapular, scapulothoracic and UE control with self care, reaching, carrying, lifting, house/yardwork, driving/computer work. Therapeutic Activities:    [] (80729 or 21224) Provided verbal/tactile cueing for activities related to improving balance, coordination, kinesthetic sense, posture, motor skill, proprioception and motor activation to allow for proper function of scapular, scapulothoracic and UE control with self care, carrying, lifting, driving/computer work. Home Exercise Program:    [x] (24729) Reviewed/Progressed HEP activities related to strengthening, flexibility, endurance, ROM of scapular, scapulothoracic and UE control with self care, reaching, carrying, lifting, house/yardwork, driving/computer work  [] (65497) Reviewed/Progressed HEP activities related to improving balance, coordination, kinesthetic sense, posture, motor skill, proprioception of scapular, scapulothoracic and UE control with self care, reaching, carrying, lifting, house/yardwork, driving/computer work      Manual Treatments:  PROM / STM / Oscillations-Mobs:  G-I, II, III, IV (PA's, Inf., Post.)  [x] (88919) Provided manual therapy to mobilize soft tissue/joints of cervical/CT, scapular GHJ and UE for the purpose of modulating pain, promoting relaxation,  increasing ROM, reducing/eliminating soft tissue swelling/inflammation/restriction, improving soft tissue extensibility and allowing for proper ROM for normal function with self care, reaching, carrying, lifting, house/yardwork, driving/computer work    Modalities:     [] GAME READY (VASO)- for significant edema, swelling, pain control. Charges:  Timed Code Treatment Minutes: 50   Total Treatment Minutes:  50      [] EVAL (LOW) 97149 (typically 20 minutes face-to-face)  [] EVAL (MOD) 12056 (typically 30 minutes face-to-face)  [] EVAL (HIGH) 34176 (typically 45 minutes face-to-face)  [] RE-EVAL     [x] WR(42611) x 1   [] IONTO  [] NMR (76194) x     [] VASO  [x] Manual (83790) x  2   [] Other:  [] TA x      [] Mech Traction (45668)  [] ES(attended) (43906)      [] ES (un) (46607):    ASSESSMENT:  Pt reports improved symptoms again with ? constant pain and overall less radiating symptoms. Pt will continue to benefit from gross posture and scap strengthening. GOALS:     Patient stated goal: relieve some of the pain, be able to continue my new workout     Therapist goals for Patient:   Short Term Goals: To be achieved in: 2 weeks  1. Independent in HEP and progression per patient tolerance, in order to prevent re-injury. [x]? Progressing: []? Met: []? Not Met: []? Adjusted      2. Patient will have a decrease in pain to facilitate improvement in movement, function, and ADLs as indicated by Functional Deficits. [x]? Progressing: []? Met: []? Not Met: []? Adjusted      Long Term Goals: To be achieved in: 12 weeks  1. Disability index score of 10% or less for the DASH to assist with reaching prior level of function. [x]? Progressing: []? Met: []? Not Met: []? Adjusted      2. Patient will demonstrate increased AROM to WNL to allow for proper joint functioning as indicated by patients Functional Deficits. [x]? Progressing: []? Met: []? Not Met: []? Adjusted      3. Patient will demonstrate an increase in Strength to 5/5 to allow for proper functional mobility as indicated by patients Functional Deficits. [x]? Progressing: []? Met: []? Not Met: []? Adjusted      4. Patient will return to all functional activities without increased symptoms or restriction. [x]? Progressing: []? Met: []? Not Met: []? Adjusted      5. Pt will exhibit self(patient specific functional goal)    [x]? Progressing: []? Met: []? Not Met: []? Adjusted        Access Code: [email or web address]  URL: [email or web address]  Date: 03/14/2022  Prepared by: Vignesh Gutting    Exercises  Standing Pec Stretch at [place] [place] - 1 x daily - 7 x weekly - 1 sets - 3 reps - 20 sec hold  Standing Bilateral Low Shoulder Row with Anchored Resistance - 1 x daily - 7 x weekly - 3 sets - 10 reps  Standing Shoulder Internal Rotation with Anchored Resistance - 1 x daily - 7 x weekly - 3 sets - 10 reps  Shoulder External Rotation with Anchored Resistance - 1 x daily - 7 x weekly - 3 sets - 10 reps      Overall Progression Towards Functional goals/ Treatment Progress Update:  [x] Patient is progressing as expected towards functional goals listed.     [] Progression is slowed due to complexities/Impairments listed. [] Progression has been slowed due to co-morbidities. [] Plan just implemented, too soon to assess goals progression <30days   [] Goals require adjustment due to lack of progress  [] Patient is not progressing as expected and requires additional follow up with physician  [] Other    Prognosis for POC: [x] Good [] Fair  [] Poor      Patient requires continued skilled intervention: [x] Yes  [] No    Treatment/Activity Tolerance:  [x] Patient able to complete treatment  [] Patient limited by fatigue  [] Patient limited by pain    [] Patient limited by other medical complications  [] Other:       PLAN: See eval  [x] Continue per plan of care [] Alter current plan (see comments above)  [] Plan of care initiated [] Hold pending MD visit [] Discharge    Electronically signed by:  Olman Munoz PT    Note: If patient does not return for scheduled/ recommended follow up visits, this note will serve as a discharge from care along with most recent update on progress.

## 2022-07-18 DIAGNOSIS — J33.9 NASAL POLYPOSIS: Primary | ICD-10-CM

## 2022-07-19 RX ORDER — FLUTICASONE PROPIONATE 50 MCG
2 SPRAY, SUSPENSION (ML) NASAL 2 TIMES DAILY
Qty: 2 EACH | Refills: 11 | Status: SHIPPED | OUTPATIENT
Start: 2022-07-19

## 2022-07-19 NOTE — PROGRESS NOTES
[facility]  [address], [address] [facility], [address]  P: [phone]  F: [phone]       Patient     Sugar Gimenez  1995    ChiefComplaint     Chief Complaint   Patient presents with    Post-Op Check     Patient is here today for her post-op visit, her left nostril keeps running, and the headaches have subsided a little       History of Present Illness     Sugar Gimenez is a pleasant 27 y.o. female here for 1 month follow-up for chronic sinusitis and nasal polyposis, allergic rhinitis. Overall she is doing well. She does have some persistent minor rhinorrhea mostly from the left side. She had a recent upper respiratory infection and was Covid negative. Her sense of smell was affected. She states her congestion symptoms were significantly better than prior to surgery. She has been using her nasal steroid spray intermittently. She has been doing immunotherapy.     Past Medical History     Past Medical History:   Diagnosis Date    Anxiety     Atypical migraine     Bipolar 2 disorder ([facility])     Borderline personality disorder ([facility] [address]     Depression     Gestational diabetes     H/O drug abuse ([facility])     hx of heroin/cocaine    IBS (irritable bowel syndrome)     Obese     PCOS (polycystic ovarian syndrome)        Past Surgical History     Past Surgical History:   Procedure Laterality Date    COLONOSCOPY      ENDOSCOPY, COLON, DIAGNOSTIC      SEPTOPLASTY N/A 7/26/2021    SPHENOIDOTOMY RIGHT, BILATERAL ANTERIOR ETHMOIDECTOMY, BILATERAL INFERIOR TURBINATE REDUCTION, BILATERAL MAXILLARY ANTROSTOMY AND SEPTOPLASTY, NASAL SCOPE performed by Koko Miller DO at [address] EXTRACTION         Family History     Family History   Problem Relation Age of Onset    Other Maternal Grandmother         demetia    Heart Disease Maternal Grandmother     High Cholesterol Maternal Grandfather     Diabetes Maternal Grandfather     High Blood Pressure Maternal · Obtain CMP and lipids   Grandfather     Heart Disease Maternal Grandfather        Social History     Social History     Socioeconomic History    Marital status: Life Partner     Spouse name: Not on file    Number of children: Not on file    Years of education: Not on file    Highest education level: Not on file   Occupational History     Comment: Refused to answer   Tobacco Use    Smoking status: Never Smoker    Smokeless tobacco: Never Used   Vaping Use    Vaping Use: Never used   Substance and Sexual Activity    Alcohol use: Not Currently    Drug use: Not Currently     Comment: heroin-snort 5 years ago    Sexual activity: Yes     Partners: Male   Other Topics Concern    Not on file   Social History Narrative    Not on file     Social Determinants of Health     Financial Resource Strain: Low Risk     Difficulty of Paying Living Expenses: Not hard at all   Food Insecurity: No Food Insecurity    Worried About [address] [employer] in the Last Year: Never true    [employer] [employer] in the Last Year: Never true   Transportation Needs: No Transportation Needs    Lack of Transportation (Medical): No    Lack of Transportation (Non-Medical): No   Physical Activity: Sufficiently Active    Days of Exercise per Week: 6 days    Minutes of Exercise per Session: 40 min   Stress: No Stress Concern Present    Feeling of Stress : Not at all   Social Connections: Moderately Isolated    Frequency of Communication with Friends and Family: Once a week    Frequency of Social Gatherings with Friends and Family: Twice a week    Attends [employer] Services: Never    Active Member of Clubs or Organizations: No    Attends Club or Organization Meetings: Never    Marital Status: Living with partner   Intimate Partner Violence:     Fear of Current or Ex-Partner:     Emotionally Abused:     Physically Abused:     Sexually Abused:         Allergies     Allergies   Allergen Reactions    Nickel Rash     Skin began to excoriate    Lexapro [Escitalopram Oxalate]      Jittery and could not sleep       Medications     Current Outpatient Medications   Medication Sig Dispense Refill    topiramate (TOPAMAX) 100 MG tablet Take 1 tablet by mouth 2 times daily 60 tablet 2    predniSONE (DELTASONE) 10 MG tablet 4 tablets daily for 7 days, 3 tablets daily for 1 day, 2 tablets daily for 1 day, 1 tablet daily for 1 day 34 tablet 0    EPINEPHrine (EPIPEN) 0.3 MG/0.3ML SOAJ injection Use as directed for allergic reaction 1 each 1    metFORMIN (GLUCOPHAGE) 500 MG tablet Take 500 mg by mouth 2 times daily (with meals)       acyclovir (ZOVIRAX) 400 MG tablet Take 400 mg by mouth      SUMAtriptan (IMITREX) 100 MG tablet Take 1 tablet by mouth once as needed for Migraine 12 tablet 1    rizatriptan (MAXALT) 10 MG tablet Take 1 tablet by mouth once as needed for Migraine May repeat in 2 hours if needed 12 tablet 3    fluticasone (FLONASE) 50 MCG/ACT nasal spray 2 sprays by Nasal route daily 2 Bottle 5    acetaminophen (APAP EXTRA STRENGTH) 500 MG tablet Take 1 tablet by mouth every 6 hours as needed for Pain Alternate every 3 hours with ibuprofen 60 tablet 0     No current facility-administered medications for this visit. Review of Systems     Review of Systems   Constitutional: Negative for chills, fatigue and fever. HENT: Negative for congestion, ear discharge, ear pain, facial swelling, hearing loss, nosebleeds, postnasal drip, rhinorrhea, sinus pressure, sinus pain, sneezing, sore throat, tinnitus, trouble swallowing and voice change. Eyes: Negative for photophobia, pain, redness, itching and visual disturbance. Respiratory: Negative for cough, choking, shortness of breath and stridor. Gastrointestinal: Negative for diarrhea and nausea. Musculoskeletal: Negative for neck pain and neck stiffness. Skin: Negative for color change and rash. Neurological: Negative for dizziness, facial asymmetry and light-headedness.    Hematological: Negative for adenopathy. Psychiatric/Behavioral: Negative for agitation and confusion. PhysicalExam     There were no vitals filed for this visit. Physical Exam  Constitutional:       Appearance: She is well-developed. HENT:      Head: Normocephalic and atraumatic. Jaw: No trismus. Right Ear: Tympanic membrane, ear canal and external ear normal. No drainage. No middle ear effusion. Tympanic membrane is not perforated. Left Ear: Tympanic membrane, ear canal and external ear normal. No drainage. No middle ear effusion. Tympanic membrane is not perforated. Nose: No septal deviation, mucosal edema or rhinorrhea. Mouth/Throat:      Dentition: Normal dentition. Pharynx: Uvula midline. No oropharyngeal exudate. Eyes:      General: No scleral icterus. Right eye: No discharge. Left eye: No discharge. Pupils: Pupils are equal, round, and reactive to light. Neck:      Thyroid: No thyromegaly. Trachea: Phonation normal. No tracheal deviation. Pulmonary:      Effort: Pulmonary effort is normal. No respiratory distress. Breath sounds: No stridor. Musculoskeletal:      Cervical back: Neck supple. Lymphadenopathy:      Cervical: No cervical adenopathy. Skin:     General: Skin is warm and dry. Neurological:      Mental Status: She is alert and oriented to person, place, and time. Cranial Nerves: No cranial nerve deficit. Psychiatric:         Behavior: Behavior normal.           Procedure     Rigid Nasal Endoscopy    Preop: Chronic sinusitis, nasal polyposis  Anes: topical lidocaine with afrin  Consent: verbal  Description:  After obtaining verbal consent from the patient 4% lidocaine with afrin was sprayed into the nasal cavities. After allowing a time for anesthesia, a nasal endoscope was placed into the naris. The septum, inferior, and middle turbinates were examined. The middle meatus, and sphenoethmoid recess was examined bilaterally. Persistent polypoid inflammation right anterior ethmoid cavity. No mucopurulent drainage. Mild inflammation of the mucosa throughout    Tolerated well without complication. Assessment and Plan     1. Allergic rhinitis, unspecified seasonality, unspecified trigger  Patient currently undergoing allergy immunotherapy. Has been titrated up to maintenance dose. Overall doing well. She does have persistent nasal polyp on exam today. Recommend she continue her nasal steroid spray 2 sprays each nostril twice a day. I encouraged her to be as consistent as possible. Like to see her back in 2 months. If there is persistent polyp, will likely switch to a nasal steroid rinse. 2. Nasal polyposis      3. Chronic maxillary sinusitis        Return in about 2 months (around 11/21/2021). Portions of this note were dictated using Dragon.  There may be linguistic errors secondary to the use of this program.

## 2022-07-20 ENCOUNTER — HOSPITAL ENCOUNTER (OUTPATIENT)
Dept: PHYSICAL THERAPY | Age: 27
Setting detail: THERAPIES SERIES
Discharge: HOME | End: 2022-07-20
Payer: COMMERCIAL

## 2022-07-20 PROCEDURE — 20561 NDL INSJ W/O NJX 3+ MUSC: CPT

## 2022-07-20 PROCEDURE — 97110 THERAPEUTIC EXERCISES: CPT

## 2022-07-20 PROCEDURE — 97140 MANUAL THERAPY 1/> REGIONS: CPT

## 2022-07-20 NOTE — FLOWSHEET NOTE
[facility]      Physical Therapy Treatment Note/ Progress Report:     Date:  2022    Patient Name:  Hilda Noble    :  1995  MRN: [id number]  Restrictions/Precautions:    Medical/Treatment Diagnosis Information:  Diagnosis: M25.512 L shoulder pain  Treatment Diagnosis: M25.512 L shoulder pain  Insurance/Certification information:   [facility]  Physician Information:  Referring Practitioner: Dr. Braxton Brush  Has the plan of care been signed (Y/N):        []  Yes  [x]  No     Date of Patient follow up with Physician: not scheduled    Is this a Progress Report:     []  Yes  [x]  No     If Yes:  Date Range for reporting period:  Beginning:  ------------ Ending:     Progress report will be due (10 Rx or 30 days whichever is less):     Recertification will be due (POC Duration  / 90 days whichever is less): 22      Visit # Insurance Allowable Auth Required   In Person 4 6 visits ( - ) [x]  Yes     []  No    Tele Health   []  Yes     []  No    Total 16 total       Functional Scale: UEFI: NV    Date assessed:  NV      Latex Allergy:  [x]NO      []YES  Preferred Language for Healthcare:   [x]English       []other:    Pain level:  1/10 neck, 2/10 shoulder blade region    SUBJECTIVE:  Pt reports she    Note: pt contacted insurance and DN is covered and no pre-auth is required - NOTE REFERRAL FOR DN (Dr. Konrad Roberson) NEXT VISIT    OBJECTIVE:   Observation:    Test measurements:    Dry needling manual therapy: consisted on the placement of 8 needles in the following muscles:  cervical multifidi C5,6 LEFT, upper trap, levator scapulaue. A 60 mm needle was inserted, piston, rotated, and coned to produce intramuscular mobilization. These techniques were used to restore functional range of motion, reduce muscle spasm and induce healing in the corresponding musculature.  (74389)  Clean Technique was utilized today while applying Dry needling treatment. The treatment sites where cleaned with 70% solution of  isopropyl alcohol . The PT washed their hands and utilized treatment gloves along with hand  prior to inserting the needles. All needles where removed and discarded in the appropriate sharps container. Pt tolerated treatment well. Noted radiating/referred symptoms to L sided PSIS as well as a \"sensation\" into L ear. Note that this subj report has been common throughout her treatment with manual therapy.     RESTRICTIONS/PRECAUTIONS: none    Exercises/Interventions:   Therapeutic Ex (80702) Sets/sec Reps Notes/CUES HEP   Row 3 10 Black TB, Tried ext (? pain) x   IR/ER  No money 3  3 10  10 GTB  GTB X (blue)   pec S at wall 20\" 3  x   Open book 1, hold 5 sec 10 Less \"crunching\" this visit (after manual rx) x   horiz ABD 3 10 GTB, ? pain in anterior shoulder x   Chin tuck 5\" 2x10  x   LS S 10\" 5 To R only x                                 Note that pt uses lacrosse ball at home for self massage     Manual Intervention (89590)                 Dry needling   See above    SL scap mobs, STM kj scap (LS) x8'  Radiating pain into head and neck, tingling into hand with pressure to LS, rhomboid/mid trap, able to lift shoulder blade away from rib cage this visit    STM upper trap, cervical paraspinals x4'  Note significant tightness lateral L neck    Hawks  x6'  sitting    Thoracic manip x1  Cavitation noted    NMR re-education (50211)   CUES NEEDED                                                                   Therapeutic Activity (15116)                                          [employer] access code:    [email or web address]           Therapeutic Exercise and NMR EXR  [x] (52635) Provided verbal/tactile cueing for activities related to strengthening, flexibility, endurance, ROM  for improvements in scapular, scapulothoracic and UE control with self care, reaching, carrying, lifting, house/yardwork, driving/computer work.    [] (02837) (typically 30 minutes face-to-face)  [] EVAL (HIGH) 77206 (typically 45 minutes face-to-face)  [] RE-EVAL     [x] RD(55284) x 1   [] IONTO  [] NMR (18313) x     [] VASO  [x] Manual (46746) x  2   [] Other:  [] TA x      [] Mech Traction (95718)  [] ES(attended) (43644)      [] ES (un) (04490):    ASSESSMENT:  Pt reports improved symptoms again with ? constant pain and overall less radiating symptoms. Pt will continue to benefit from gross posture and scap strengthening. GOALS:     Patient stated goal: relieve some of the pain, be able to continue my new workout     Therapist goals for Patient:   Short Term Goals: To be achieved in: 2 weeks  1. Independent in HEP and progression per patient tolerance, in order to prevent re-injury. [x] Progressing: [] Met: [] Not Met: [] Adjusted      2. Patient will have a decrease in pain to facilitate improvement in movement, function, and ADLs as indicated by Functional Deficits. [x] Progressing: [] Met: [] Not Met: [] Adjusted      Long Term Goals: To be achieved in: 12 weeks  1. Disability index score of 10% or less for the DASH to assist with reaching prior level of function. [x] Progressing: [] Met: [] Not Met: [] Adjusted      2. Patient will demonstrate increased AROM to WNL to allow for proper joint functioning as indicated by patients Functional Deficits. [x] Progressing: [] Met: [] Not Met: [] Adjusted      3. Patient will demonstrate an increase in Strength to 5/5 to allow for proper functional mobility as indicated by patients Functional Deficits. [x] Progressing: [] Met: [] Not Met: [] Adjusted      4. Patient will return to all functional activities without increased symptoms or restriction. [x] Progressing: [] Met: [] Not Met: [] Adjusted      5. Pt will exhibit self(patient specific functional goal)    [x] Progressing: [] Met: [] Not Met: [] Adjusted        Access Code: [email or web address]  URL: [email or web address]  Date: 03/14/2022  Prepared by: Kim Tio    Exercises  Standing Pec Stretch at [place] - 1 x daily - 7 x weekly - 1 sets - 3 reps - 20 sec hold  Standing Bilateral Low Shoulder Row with Anchored Resistance - 1 x daily - 7 x weekly - 3 sets - 10 reps  Standing Shoulder Internal Rotation with Anchored Resistance - 1 x daily - 7 x weekly - 3 sets - 10 reps  Shoulder External Rotation with Anchored Resistance - 1 x daily - 7 x weekly - 3 sets - 10 reps      Overall Progression Towards Functional goals/ Treatment Progress Update:  [x] Patient is progressing as expected towards functional goals listed. [] Progression is slowed due to complexities/Impairments listed. [] Progression has been slowed due to co-morbidities. [] Plan just implemented, too soon to assess goals progression <30days   [] Goals require adjustment due to lack of progress  [] Patient is not progressing as expected and requires additional follow up with physician  [] Other    Prognosis for POC: [x] Good [] Fair  [] Poor      Patient requires continued skilled intervention: [x] Yes  [] No    Treatment/Activity Tolerance:  [x] Patient able to complete treatment  [] Patient limited by fatigue  [] Patient limited by pain    [] Patient limited by other medical complications  [] Other:       PLAN: See eval  [x] Continue per plan of care [] Alter current plan (see comments above)  [] Plan of care initiated [] Hold pending MD visit [] Discharge    Electronically signed by:  Nanette Celaya PT    Note: If patient does not return for scheduled/ recommended follow up visits, this note will serve as a discharge from care along with most recent update on progress.

## 2022-07-25 ENCOUNTER — NURSE ONLY (OUTPATIENT)
Dept: ENT CLINIC | Age: 27
End: 2022-07-25
Payer: COMMERCIAL

## 2022-07-25 DIAGNOSIS — J30.9 ALLERGIC RHINITIS, UNSPECIFIED SEASONALITY, UNSPECIFIED TRIGGER: Primary | ICD-10-CM

## 2022-07-25 PROCEDURE — 95117 IMMUNOTHERAPY INJECTIONS: CPT | Performed by: OTOLARYNGOLOGY

## 2022-07-25 NOTE — PROGRESS NOTES
Correct serum vials verified by patient and nurse. After obtaining consent, and per orders of Dr Eldon Foote, injections of allergy serum given by MADHAV Faria RN. Last injection dose repeated. It has been 2 weeks since patient has had an injection. Patient instructed to remain in clinic for 30 minutes after injection, and to report any adverse reactions to me immediately. No change in patient status post injection.

## 2022-07-27 ENCOUNTER — HOSPITAL ENCOUNTER (OUTPATIENT)
Dept: PHYSICAL THERAPY | Age: 27
Setting detail: THERAPIES SERIES
Discharge: HOME | End: 2022-07-27
Payer: COMMERCIAL

## 2022-07-27 PROCEDURE — 20560 NDL INSJ W/O NJX 1 OR 2 MUSC: CPT

## 2022-07-27 PROCEDURE — G0283 ELEC STIM OTHER THAN WOUND: HCPCS

## 2022-07-27 PROCEDURE — 97140 MANUAL THERAPY 1/> REGIONS: CPT

## 2022-07-27 PROCEDURE — 97110 THERAPEUTIC EXERCISES: CPT

## 2022-07-27 NOTE — FLOWSHEET NOTE
[facility]      Physical Therapy Treatment Note/ Progress Report:     Date:  2022    Patient Name:  Yovanny Gee    :  1995  MRN: [id number]  Restrictions/Precautions:    Medical/Treatment Diagnosis Information:  Diagnosis: M25.512 L shoulder pain  Treatment Diagnosis: M25.512 L shoulder pain  Insurance/Certification information:   [facility]  Physician Information:  Referring Practitioner: Dr. Love Gong  Has the plan of care been signed (Y/N):        []  Yes  [x]  No     Date of Patient follow up with Physician: not scheduled    Is this a Progress Report:     []  Yes  [x]  No     If Yes:  Date Range for reporting period:  Beginning:  ------------ Ending:     Progress report will be due (10 Rx or 30 days whichever is less):     Recertification will be due (POC Duration  / 90 days whichever is less): 22      Visit # Insurance Allowable Auth Required   In Person 5 6 visits ( - ) [x]  Yes     []  No    Tele Health   []  Yes     []  No    Total 17 total       Functional Scale: UEFI: NV    Date assessed:  NV      Latex Allergy:  [x]NO      []YES  Preferred Language for Healthcare:   [x]English       []other:    Pain level:  2/10 shoulder blade region, anterior shoulder    SUBJECTIVE:  Pt returned to pole class yesterday and felt good. Pt reports numbness in arm feels as if it comes from anterior shoulder. Pt states she felt good after DN last visit, noting on her way home she felt some twitching in those muscles. Pt has had no migraines and notes this is especially noteworthy at this time because she usually gets migraines before or after menstrual cycle starting.     Note: pt contacted insurance and DN is covered and no pre-auth is required - NOTE REFERRAL FOR DN (Dr. Oscar Espinoza) NEXT VISIT    OBJECTIVE:   Observation:    Test measurements:    Dry needling manual therapy: consisted on the placement of 8 needles in the following muscles:  cervical multifidi C5,6 LEFT, upper trap, levator scapulae, middle delt. A 60 mm needle was inserted, piston, rotated, and coned to produce intramuscular mobilization. These techniques were used to restore functional range of motion, reduce muscle spasm and induce healing in the corresponding musculature. (50111)  Clean Technique was utilized today while applying Dry needling treatment. The treatment sites where cleaned with 70% solution of  isopropyl alcohol . The PT washed their hands and utilized treatment gloves along with hand  prior to inserting the needles. All needles where removed and discarded in the appropriate sharps container. Pt tolerated treatment well. Noted radiating/referred symptoms to L sided PSIS as well as a \"sensation\" into L ear. Note that this subj report has been common throughout her treatment with manual therapy. After treatment pt noted some ?  tenderness and pain in R side cervical spine as well as a fatigue feeling in L shoulder    RESTRICTIONS/PRECAUTIONS: none    Exercises/Interventions:   Therapeutic Ex (08966) Sets/sec Reps Notes/CUES HEP   Row 3 15 Black TB, Tried ext (? pain) x   IR/ER  No money 3  3 10  10 GTB  RTB X (blue)   pec S at wall 20\" 3  x   Open book 1, hold 5 sec 10 Less \"crunching\" this visit (after manual rx) x   horiz ABD 3 10 GTB, ? pain in anterior shoulder x   Chin tuck 5\" 2x10  x   To R only, NV x                                 Note that pt uses lacrosse ball at home for self massage     Manual Intervention (50366)                 Dry needling   See above    SL scap mobs, STM kj scap (LS) x5'  Radiating pain into head and neck, tingling into hand with pressure to LS, rhomboid/mid trap, able to lift shoulder blade away from rib cage this visit    STM upper trap, cervical paraspinals x5'  Note significant tightness lateral L neck    [place]ks  x4'  sitting    Thoracic manip x1  Cavitation noted    NMR re-education (46653) reducing/eliminating soft tissue swelling/inflammation/restriction, improving soft tissue extensibility and allowing for proper ROM for normal function with self care, reaching, carrying, lifting, house/yardwork, driving/computer work    Modalities:  Electrical Stimulation for pain control, swelling reduction. Waveform: premod; Cycle Time: Continuous; Frequency: 100 pps; Polarity: Negative; Ramp: 2 sec; Amplitude: 7.4 Volts Treatment time: 10 min. [] GAME READY (VASO)- for significant edema, swelling, pain control. Charges:  Timed Code Treatment Minutes: 50   Total Treatment Minutes:  50      [] EVAL (LOW) 61203 (typically 20 minutes face-to-face)  [] EVAL (MOD) 40438 (typically 30 minutes face-to-face)  [] EVAL (HIGH) 24632 (typically 45 minutes face-to-face)  [] RE-EVAL     [x] LUZ(09085) x 1   [] IONTO  [] NMR (50478) x     [] VASO  [x] Manual (76492) x  1   [x] Other: dry needling  [] TA x      [] Mech Traction (17347)  [] ES(attended) (51717)      [x] ES (un) (57821):    ASSESSMENT:  Pt reports improved symptoms with ? posterior shoulder tightness, ? constant pain and overall less radiating symptoms. Pt will continue to benefit from gross posture and scap strengthening. Pt tolerated the addition of dry needling. Pt also exhibits more specific shoulder symptoms with ? anterior shoulder pain. GOALS:     Patient stated goal: relieve some of the pain, be able to continue my new workout     Therapist goals for Patient:   Short Term Goals: To be achieved in: 2 weeks  1. Independent in HEP and progression per patient tolerance, in order to prevent re-injury. [] Progressing: [x] Met: [] Not Met: [] Adjusted      2. Patient will have a decrease in pain to facilitate improvement in movement, function, and ADLs as indicated by Functional Deficits. [] Progressing: [x] Met: [] Not Met: [] Adjusted      Long Term Goals: To be achieved in: 12 weeks  1.  Disability index score of 10% or less for the DASH to assist with reaching prior level of function. [x] Progressing: [] Met: [] Not Met: [] Adjusted      2. Patient will demonstrate increased AROM to WNL to allow for proper joint functioning as indicated by patients Functional Deficits. [x] Progressing: [] Met: [] Not Met: [] Adjusted      3. Patient will demonstrate an increase in Strength to 5/5 to allow for proper functional mobility as indicated by patients Functional Deficits. [x] Progressing: [] Met: [] Not Met: [] Adjusted      4. Patient will return to all functional activities without increased symptoms or restriction. [x] Progressing: [] Met: [] Not Met: [] Adjusted      5. Pt will exhibit self(patient specific functional goal)    [x] Progressing: [] Met: [] Not Met: [] Adjusted        Access Code: [email or web address]  URL: [email or web address]  Date: 03/14/2022  Prepared by: Ely Lesches    Exercises  Standing Pec Stretch at [place] - 1 x daily - 7 x weekly - 1 sets - 3 reps - 20 sec hold  Standing Bilateral Low Shoulder Row with Anchored Resistance - 1 x daily - 7 x weekly - 3 sets - 10 reps  Standing Shoulder Internal Rotation with Anchored Resistance - 1 x daily - 7 x weekly - 3 sets - 10 reps  Shoulder External Rotation with Anchored Resistance - 1 x daily - 7 x weekly - 3 sets - 10 reps      Overall Progression Towards Functional goals/ Treatment Progress Update:  [x] Patient is progressing as expected towards functional goals listed. [] Progression is slowed due to complexities/Impairments listed. [] Progression has been slowed due to co-morbidities.   [] Plan just implemented, too soon to assess goals progression <30days   [] Goals require adjustment due to lack of progress  [] Patient is not progressing as expected and requires additional follow up with physician  [] Other    Prognosis for POC: [x] Good [] Fair  [] Poor      Patient requires continued skilled intervention: [x] Yes  [] No    Treatment/Activity Tolerance:  [x] Patient able to complete treatment  [] Patient limited by fatigue  [] Patient limited by pain    [] Patient limited by other medical complications  [] Other:       PLAN: See eval  [x] Continue per plan of care [] Alter current plan (see comments above)  [] Plan of care initiated [] Hold pending MD visit [] Discharge    Electronically signed by:  Echo Bright PT    Note: If patient does not return for scheduled/ recommended follow up visits, this note will serve as a discharge from care along with most recent update on progress.

## 2022-08-01 ENCOUNTER — NURSE ONLY (OUTPATIENT)
Dept: ENT CLINIC | Age: 27
End: 2022-08-01
Payer: COMMERCIAL

## 2022-08-01 DIAGNOSIS — J30.9 ALLERGIC RHINITIS, UNSPECIFIED SEASONALITY, UNSPECIFIED TRIGGER: Primary | ICD-10-CM

## 2022-08-01 PROCEDURE — 95117 IMMUNOTHERAPY INJECTIONS: CPT | Performed by: OTOLARYNGOLOGY

## 2022-08-01 NOTE — PROGRESS NOTES
Repeat doses this week. Pt had local reactions in both arms about 20mm. Correct serum vials verified by patient and nurse. After obtaining consent, and per orders of Dr Kim Adamson, injections of allergy serum given by MADHAV Faria RN. Patient instructed to remain in clinic for 30 minutes after injection, and to report any adverse reactions to me immediately. No change in patient status post injection.

## 2022-08-02 ENCOUNTER — OFFICE VISIT (OUTPATIENT)
Dept: ENT CLINIC | Age: 27
End: 2022-08-02
Payer: COMMERCIAL

## 2022-08-02 VITALS
HEIGHT: 60 IN | DIASTOLIC BLOOD PRESSURE: 72 MMHG | SYSTOLIC BLOOD PRESSURE: 114 MMHG | HEART RATE: 82 BPM | WEIGHT: 171 LBS | BODY MASS INDEX: 33.57 KG/M2

## 2022-08-02 DIAGNOSIS — J33.9 NASAL POLYPOSIS: Primary | ICD-10-CM

## 2022-08-02 DIAGNOSIS — B96.89 ACUTE BACTERIAL SINUSITIS: ICD-10-CM

## 2022-08-02 DIAGNOSIS — J32.3 CHRONIC SPHENOIDAL SINUSITIS: ICD-10-CM

## 2022-08-02 DIAGNOSIS — J01.90 ACUTE BACTERIAL SINUSITIS: ICD-10-CM

## 2022-08-02 DIAGNOSIS — J32.0 CHRONIC MAXILLARY SINUSITIS: ICD-10-CM

## 2022-08-02 DIAGNOSIS — J30.1 SEASONAL ALLERGIC RHINITIS DUE TO POLLEN: ICD-10-CM

## 2022-08-02 PROCEDURE — 1036F TOBACCO NON-USER: CPT | Performed by: OTOLARYNGOLOGY

## 2022-08-02 PROCEDURE — 99214 OFFICE O/P EST MOD 30 MIN: CPT | Performed by: OTOLARYNGOLOGY

## 2022-08-02 PROCEDURE — G8427 DOCREV CUR MEDS BY ELIG CLIN: HCPCS | Performed by: OTOLARYNGOLOGY

## 2022-08-02 PROCEDURE — G8417 CALC BMI ABV UP PARAM F/U: HCPCS | Performed by: OTOLARYNGOLOGY

## 2022-08-02 PROCEDURE — 31231 NASAL ENDOSCOPY DX: CPT | Performed by: OTOLARYNGOLOGY

## 2022-08-02 RX ORDER — AMOXICILLIN AND CLAVULANATE POTASSIUM 875; 125 MG/1; MG/1
1 TABLET, FILM COATED ORAL 2 TIMES DAILY
Qty: 20 TABLET | Refills: 0 | Status: SHIPPED | OUTPATIENT
Start: 2022-08-02 | End: 2022-08-12

## 2022-08-02 ASSESSMENT — ENCOUNTER SYMPTOMS
FACIAL SWELLING: 0
EYE PAIN: 0
SINUS PAIN: 0
SORE THROAT: 0
CHOKING: 0
COLOR CHANGE: 0
RHINORRHEA: 0
COUGH: 1
VOICE CHANGE: 0
PHOTOPHOBIA: 0
EYE REDNESS: 0
SINUS PRESSURE: 0
SHORTNESS OF BREATH: 0
EYE ITCHING: 0
DIARRHEA: 0
NAUSEA: 0
STRIDOR: 0
TROUBLE SWALLOWING: 0

## 2022-08-02 NOTE — PROGRESS NOTES
[facility]  [address] [address] [facility], [address]  P: [phone]  F: [phone]       Patient     Corinthia Fabry  1995    ChiefComplaint     Chief Complaint   Patient presents with    Follow-up     Patient is here today for her 1 year follow up, she is doing well there still is some congestion and when she gets a headache it is mainly on the left side with drainage       History of Present Illness     Corinthia Fabry is a pleasant 32 y.o. female here for 9-month follow-up for chronic sinusitis, allergic rhinitis, nasal polyposis. Patient is undergoing immunotherapy. She does have some occasional congestion, predominantly with some left-sided pressure and drainage. No significant reaction to allergy immunotherapy. Continues to take Flonase and Zyrtec. Over the past week, she has been experiencing some sinonasal congestion, postnasal drainage with a cough. COVID testing is negative.     Past Medical History     Past Medical History:   Diagnosis Date    Anxiety     Atypical migraine     Bipolar 2 disorder (HCC)     Borderline personality disorder ([facility]     Depression     Gestational diabetes     H/O drug abuse ([facility]     hx of heroin/cocaine    IBS (irritable bowel syndrome)     Obese     PCOS (polycystic ovarian syndrome)        Past Surgical History     Past Surgical History:   Procedure Laterality Date    COLONOSCOPY      ENDOSCOPY, COLON, DIAGNOSTIC      SEPTOPLASTY N/A 7/26/2021    SPHENOIDOTOMY RIGHT, BILATERAL ANTERIOR ETHMOIDECTOMY, BILATERAL INFERIOR TURBINATE REDUCTION, BILATERAL MAXILLARY ANTROSTOMY AND SEPTOPLASTY, NASAL SCOPE performed by Leonor Moncada DO at [address] EXTRACTION         Family History     Family History   Problem Relation Age of Onset    Other Maternal Grandmother         demetia    Heart Disease Maternal Grandmother     High Cholesterol Maternal Grandfather     Diabetes Maternal Grandfather     High Blood Pressure Maternal Grandfather     Heart Disease Maternal Grandfather        Social History     Social History     Socioeconomic History    Marital status: Life Partner     Spouse name: Not on file    Number of children: Not on file    Years of education: Not on file    Highest education level: Not on file   Occupational History     Comment: Refused to answer   Tobacco Use    Smoking status: Never    Smokeless tobacco: Never   Vaping Use    Vaping Use: Never used   Substance and Sexual Activity    Alcohol use: Not Currently    Drug use: Not Currently     Comment: heroin-snort 5 years ago    Sexual activity: Yes     Partners: Male   Other Topics Concern    Not on file   Social History Narrative    Not on file     Social Determinants of Health     Financial Resource Strain: Not on file   Food Insecurity: Not on file   Transportation Needs: Not on file   Physical Activity: Not on file   Stress: Not on file   Social Connections: Not on file   Intimate Partner Violence: Not on file   Housing Stability: Not on file       Allergies     Allergies   Allergen Reactions    Nickel Rash     Skin began to excoriate    Lexapro [Escitalopram Oxalate]      Jittery and could not sleep       Medications     Current Outpatient Medications   Medication Sig Dispense Refill    amoxicillin-clavulanate (AUGMENTIN) 875-125 MG per tablet Take 1 tablet by mouth in the morning and 1 tablet before bedtime. Do all this for 10 days.  20 tablet 0    fluticasone (FLONASE) 50 MCG/ACT nasal spray 2 sprays by Nasal route in the morning and at bedtime 2 each 11    SUMAtriptan (IMITREX) 100 MG tablet Take 100 mg by mouth once as needed for Migraine      methylphenidate (METADATE ER) 10 MG extended release tablet 15 mg.       methylphenidate (RITALIN) 5 MG tablet 10 mg.       cyclobenzaprine (FLEXERIL) 5 MG tablet Take 1 tab nightly and as needed      cetirizine (ZYRTEC) 10 MG tablet Take 10 mg by mouth daily      albuterol sulfate HFA (VENTOLIN HFA) 108 (90 Base) MCG/ACT inhaler Inhale 2 puffs into the lungs every 6 hours as needed for Wheezing or Shortness of Breath (cough) 54 g 1    EPINEPHrine (EPIPEN) 0.3 MG/0.3ML SOAJ injection Use as directed for allergic reaction 1 each 1    metFORMIN (GLUCOPHAGE) 500 MG tablet Take 500 mg by mouth daily (with breakfast) Take 500 mg in the morning and 1000 mg in the evenings      acyclovir (ZOVIRAX) 400 MG tablet Take 400 mg by mouth       No current facility-administered medications for this visit. Review of Systems     Review of Systems   Constitutional:  Negative for chills, fatigue and fever. HENT:  Positive for congestion and postnasal drip. Negative for ear discharge, ear pain, facial swelling, hearing loss, nosebleeds, rhinorrhea, sinus pressure, sinus pain, sneezing, sore throat, tinnitus, trouble swallowing and voice change. Eyes:  Negative for photophobia, pain, redness, itching and visual disturbance. Respiratory:  Positive for cough. Negative for choking, shortness of breath and stridor. Gastrointestinal:  Negative for diarrhea and nausea. Musculoskeletal:  Negative for neck pain and neck stiffness. Skin:  Negative for color change and rash. Neurological:  Negative for dizziness, facial asymmetry and light-headedness. Hematological:  Negative for adenopathy. Psychiatric/Behavioral:  Negative for agitation and confusion. PhysicalExam     Vitals:    08/02/22 1614   BP: 114/72   Pulse: 82       Physical Exam  Constitutional:       Appearance: She is well-developed. HENT:      Head: Normocephalic and atraumatic. Jaw: No trismus. Right Ear: Tympanic membrane, ear canal and external ear normal. No drainage. No middle ear effusion. Tympanic membrane is not perforated. Left Ear: Tympanic membrane, ear canal and external ear normal. No drainage. No middle ear effusion. Tympanic membrane is not perforated. Nose: No septal deviation, mucosal edema or rhinorrhea.       Mouth/Throat: Dentition: Normal dentition. Pharynx: Uvula midline. No oropharyngeal exudate. Eyes:      General: No scleral icterus. Right eye: No discharge. Left eye: No discharge. Pupils: Pupils are equal, round, and reactive to light. Neck:      Thyroid: No thyromegaly. Trachea: Phonation normal. No tracheal deviation. Pulmonary:      Effort: Pulmonary effort is normal. No respiratory distress. Breath sounds: No stridor. Musculoskeletal:      Cervical back: Neck supple. Lymphadenopathy:      Cervical: No cervical adenopathy. Skin:     General: Skin is warm and dry. Neurological:      Mental Status: She is alert and oriented to person, place, and time. Cranial Nerves: No cranial nerve deficit. Psychiatric:         Behavior: Behavior normal.         Procedure     Rigid Nasal Endoscopy    Preop: Nasal polyposis, chronic sinusitis  Postop: Acute bacterial sinusitis  Anes: topical lidocaine with afrin  Consent: verbal  Description:  After obtaining verbal consent from the patient 4% lidocaine with afrin was sprayed into the nasal cavities. After allowing a time for anesthesia, a nasal endoscope was placed into the naris. The septum, inferior, and middle turbinates were examined. The middle meatus, and sphenoethmoid recess was examined bilaterally. Mucopurulent drainage emanating from right middle meatus, right sphenoid, left middle meatus. Moderate mucosal edema. No recurrence of polyp noted. Tolerated well without complication. Assessment and Plan     1. Nasal polyposis  Patient's allergy symptoms well controlled with immunotherapy, fluticasone, Zyrtec. We will continue immunotherapy. Nasal endoscopy reveals no recurrent polyps. She does however have a bacterial sinus infection. Will place on 10-day course of Augmentin. I recommend increasing nasal saline rinses to twice a day.   If symptoms do not improve after completion of antibiotic, she should call for follow-up. Otherwise follow-up in 1 year. 2. Chronic maxillary sinusitis      3. Chronic sphenoidal sinusitis      4. Seasonal allergic rhinitis due to pollen      5. Acute bacterial sinusitis    - amoxicillin-clavulanate (AUGMENTIN) 875-125 MG per tablet; Take 1 tablet by mouth in the morning and 1 tablet before bedtime. Do all this for 10 days. Dispense: 20 tablet; Refill: 0    Return in about 1 year (around 8/2/2023). Portions of this note were dictated using Dragon.  There may be linguistic errors secondary to the use of this program.

## 2022-08-03 ENCOUNTER — HOSPITAL ENCOUNTER (OUTPATIENT)
Dept: PHYSICAL THERAPY | Age: 27
Setting detail: THERAPIES SERIES
Discharge: HOME | End: 2022-08-03
Payer: COMMERCIAL

## 2022-08-03 PROCEDURE — 97140 MANUAL THERAPY 1/> REGIONS: CPT

## 2022-08-03 PROCEDURE — 20560 NDL INSJ W/O NJX 1 OR 2 MUSC: CPT

## 2022-08-03 PROCEDURE — 97110 THERAPEUTIC EXERCISES: CPT

## 2022-08-03 NOTE — FLOWSHEET NOTE
[facility]      Physical Therapy Treatment Note/ Progress Report:     Date:  8/3/2022    Patient Name:  Catracho Fragoso    :  1995  MRN: [id number]  Restrictions/Precautions:    Medical/Treatment Diagnosis Information:  Diagnosis: M25.512 L shoulder pain  Treatment Diagnosis: M25.512 L shoulder pain  Insurance/Certification information:   [facility]  Physician Information:  Referring Practitioner: Dr. Gabriel Gudino  Has the plan of care been signed (Y/N):        []  Yes  [x]  No     Date of Patient follow up with Physician: not scheduled    Is this a Progress Report:     []  Yes  [x]  No     If Yes:  Date Range for reporting period:  Beginning:  ------------ Ending:     Progress report will be due (10 Rx or 30 days whichever is less): 31    Recertification will be due (POC Duration  / 90 days whichever is less): 22      Visit # Insurance Allowable Auth Required   In Person 6 6 visits ( - ) [x]  Yes     []  No    Tele Health   []  Yes     []  No    Total 17 total       Functional Scale:   Date assessed:      Latex Allergy:  [x]NO      []YES  Preferred Language for Healthcare:   [x]English       []other:    Pain level:  2/10 shoulder blade region, anterior shoulder    SUBJECTIVE:  Pt had x1 HA this past week (pt states this is notable as she usually has at least 4-5 during menstrual cycle). Pt continues to note ? shoulder pain with ABD as well as N/T into hand with prolonged ABD. Pt states she feels less bad days.      Note: pt contacted insurance and DN is covered and no pre-auth is required - NOTE REFERRAL FOR DN (Dr. Jhonathan Webster) NEXT VISIT    OBJECTIVE:   Observation:    Test measurements:    Dry needling manual therapy: consisted on the placement of 8 needles in the following muscles:  cervical multifidi C5,6 LEFT and RIGHT (8/3), upper trap, levator scapulae,   A 60 mm needle was inserted, piston, rotated, and coned to produce intramuscular mobilization. These techniques were used to restore functional range of motion, reduce muscle spasm and induce healing in the corresponding musculature. (92041)  Clean Technique was utilized today while applying Dry needling treatment. The treatment sites where cleaned with 70% solution of  isopropyl alcohol . The PT washed their hands and utilized treatment gloves along with hand  prior to inserting the needles. All needles where removed and discarded in the appropriate sharps container. Pt tolerated treatment well. Noted radiating/referred symptoms to L sided PSIS as well as a \"sensation\" into L ear. Note that this subj report has been common throughout her treatment with manual therapy. After treatment pt noted some ?  tenderness and pain in R side cervical spine as well as a fatigue feeling in L shoulder    RESTRICTIONS/PRECAUTIONS: none    Exercises/Interventions:   Therapeutic Ex (41867) Sets/sec Reps Notes/CUES HEP   Row 3 15 Black TB, Tried ext (? pain) x   IR/ER  3  10  GTB  RTB X (blue)   pec S at wall 20\" 3  x   Open book 1, hold 5 sec 10 Less \"crunching\" this visit (after manual rx) x   horiz ABD 3 10 GTB, ? pain in anterior shoulder x   Chin tuck 5\" 2x10  x   To R only, NV x                                 Note that pt uses lacrosse ball at home for self massage     Manual Intervention (65751)                 Dry needling   See above    SL scap mobs, STM kj scap (LS) x5'  Radiating pain into head and neck, tingling into hand with pressure to LS, rhomboid/mid trap, able to lift shoulder blade away from rib cage this visit    STM upper trap, cervical paraspinals x5'  Note significant tightness lateral L neck    [place]  x4'  sitting    NV Cavitation noted    NMR re-education (81275)   CUES NEEDED                                                                   Therapeutic Activity (77863)                                          [employer] access code:    Cristaozzy Norbert Therapeutic Exercise and NMR EXR  [x] (05727) Provided verbal/tactile cueing for activities related to strengthening, flexibility, endurance, ROM  for improvements in scapular, scapulothoracic and UE control with self care, reaching, carrying, lifting, house/yardwork, driving/computer work.    [] (19730) Provided verbal/tactile cueing for activities related to improving balance, coordination, kinesthetic sense, posture, motor skill, proprioception  to assist with  scapular, scapulothoracic and UE control with self care, reaching, carrying, lifting, house/yardwork, driving/computer work. Therapeutic Activities:    [] (00486 or 87880) Provided verbal/tactile cueing for activities related to improving balance, coordination, kinesthetic sense, posture, motor skill, proprioception and motor activation to allow for proper function of scapular, scapulothoracic and UE control with self care, carrying, lifting, driving/computer work.      Home Exercise Program:    [x] (02231) Reviewed/Progressed HEP activities related to strengthening, flexibility, endurance, ROM of scapular, scapulothoracic and UE control with self care, reaching, carrying, lifting, house/yardwork, driving/computer work  [] (80126) Reviewed/Progressed HEP activities related to improving balance, coordination, kinesthetic sense, posture, motor skill, proprioception of scapular, scapulothoracic and UE control with self care, reaching, carrying, lifting, house/yardwork, driving/computer work      Manual Treatments:  PROM / STM / Oscillations-Mobs:  G-I, II, III, IV (PA's, Inf., Post.)  [x] (90746) Provided manual therapy to mobilize soft tissue/joints of cervical/CT, scapular GHJ and UE for the purpose of modulating pain, promoting relaxation,  increasing ROM, reducing/eliminating soft tissue swelling/inflammation/restriction, improving soft tissue extensibility and allowing for proper ROM for normal function with self care, reaching, carrying, lifting, house/yardwork, driving/computer work    Modalities:     [] GAME READY (VASO)- for significant edema, swelling, pain control. Charges:  Timed Code Treatment Minutes: 50   Total Treatment Minutes:  50      [] EVAL (LOW) 38591 (typically 20 minutes face-to-face)  [] EVAL (MOD) 23512 (typically 30 minutes face-to-face)  [] EVAL (HIGH) 94632 (typically 45 minutes face-to-face)  [] RE-EVAL     [x] VH(62191) x 2   [] IONTO  [] NMR (88320) x     [] VASO  [x] Manual (31811) x  1   [x] Other: dry needling  [] TA x      [] Mech Traction (20172)  [] ES(attended) (59825)      [] ES (un) (58266):    ASSESSMENT:  Pt reports improved symptoms with ? posterior shoulder tightness, ? constant pain and overall less radiating symptoms. Pt will continue to benefit from gross posture and scap strengthening. Pt tolerated the addition of dry needling. Pt also exhibits more specific shoulder symptoms with ? anterior shoulder pain. GOALS:     Patient stated goal: relieve some of the pain, be able to continue my new workout     Therapist goals for Patient:   Short Term Goals: To be achieved in: 2 weeks  1. Independent in HEP and progression per patient tolerance, in order to prevent re-injury. [] Progressing: [x] Met: [] Not Met: [] Adjusted      2. Patient will have a decrease in pain to facilitate improvement in movement, function, and ADLs as indicated by Functional Deficits. [] Progressing: [x] Met: [] Not Met: [] Adjusted      Long Term Goals: To be achieved in: 12 weeks  1. Disability index score of 10% or less for the DASH to assist with reaching prior level of function. [x] Progressing: [] Met: [] Not Met: [] Adjusted      2. Patient will demonstrate increased AROM to WNL to allow for proper joint functioning as indicated by patients Functional Deficits. [x] Progressing: [] Met: [] Not Met: [] Adjusted      3.  Patient will demonstrate an increase in Strength to 5/5 to allow for proper functional mobility as indicated by patients Functional Deficits. [x] Progressing: [] Met: [] Not Met: [] Adjusted      4. Patient will return to all functional activities without increased symptoms or restriction. [x] Progressing: [] Met: [] Not Met: [] Adjusted      5. Pt will exhibit self(patient specific functional goal)    [x] Progressing: [] Met: [] Not Met: [] Adjusted        Access Code: [email or web address]  URL: [email or web address]  Date: 03/14/2022  Prepared by: Rordick Padilla    Exercises  Standing Pec Stretch at [employer] - 1 x daily - 7 x weekly - 1 sets - 3 reps - 20 sec hold  Standing Bilateral Low Shoulder Row with Anchored Resistance - 1 x daily - 7 x weekly - 3 sets - 10 reps  Standing Shoulder Internal Rotation with Anchored Resistance - 1 x daily - 7 x weekly - 3 sets - 10 reps  Shoulder External Rotation with Anchored Resistance - 1 x daily - 7 x weekly - 3 sets - 10 reps      Overall Progression Towards Functional goals/ Treatment Progress Update:  [x] Patient is progressing as expected towards functional goals listed. [] Progression is slowed due to complexities/Impairments listed. [] Progression has been slowed due to co-morbidities.   [] Plan just implemented, too soon to assess goals progression <30days   [] Goals require adjustment due to lack of progress  [] Patient is not progressing as expected and requires additional follow up with physician  [] Other    Prognosis for POC: [x] Good [] Fair  [] Poor      Patient requires continued skilled intervention: [x] Yes  [] No    Treatment/Activity Tolerance:  [x] Patient able to complete treatment  [] Patient limited by fatigue  [] Patient limited by pain    [] Patient limited by other medical complications  [] Other:       PLAN: See eval  [x] Continue per plan of care [] Alter current plan (see comments above)  [] Plan of care initiated [] Hold pending MD visit [] Discharge    Electronically signed by:  Cornelio Hermosillo, PT    Note: If patient does not return for

## 2022-08-09 RX ORDER — CELECOXIB 200 MG/1
CAPSULE ORAL
Qty: 30 CAPSULE | Refills: 1 | Status: SHIPPED | OUTPATIENT
Start: 2022-08-09 | End: 2022-10-04

## 2022-08-09 RX ORDER — AMITRIPTYLINE HYDROCHLORIDE 10 MG/1
TABLET, FILM COATED ORAL
Qty: 90 TABLET | Refills: 1 | Status: SHIPPED | OUTPATIENT
Start: 2022-08-09 | End: 2022-10-04

## 2022-08-09 NOTE — TELEPHONE ENCOUNTER
Medication:   Requested Prescriptions     Pending Prescriptions Disp Refills    amitriptyline (ELAVIL) 10 MG tablet [Pharmacy Med Name: AMITRIPTYLINE HCL 10 MG TAB] 90 tablet 1     Sig: TAKE 1 TABLET BY MOUTH EVERY DAY AT NIGHT     Last Filled:  2/28/22    Last appt: 6/29/2022   Next appt: Visit date not found

## 2022-08-09 NOTE — TELEPHONE ENCOUNTER
Medication:   Requested Prescriptions     Pending Prescriptions Disp Refills    celecoxib (CELEBREX) 200 MG capsule [Pharmacy Med Name: CELECOXIB 200 MG CAPSULE] 30 capsule 1     Sig: TAKE 1 CAPSULE BY MOUTH EVERY DAY        Last Filled:      Patient Phone Number: [phone] (home)     Last appt: 6/29/2022   Next appt: 8/8/2022    Last OARRS: No flowsheet data found. Preferred Pharmacy:   [facility] [address]  [phone] [facility] [phone]  [facility] [address]  Phone: [phone] Fax: [phone]    Medication:   Requested Prescriptions     Pending Prescriptions Disp Refills    celecoxib (CELEBREX) 200 MG capsule [Pharmacy Med Name: CELECOXIB 200 MG CAPSULE] 30 capsule 1     Sig: TAKE 1 CAPSULE BY MOUTH EVERY DAY        Last Filled:      Patient Phone Number: [phone] (home)     Last appt: 6/29/2022   Next appt: 8/8/2022    Last OARRS: No flowsheet data found.     Preferred Pharmacy:   [facility] [address] [facility] [place], [address] [phone] Arvid [facility] [phone]  [facility] [address]  Phone: [phone] Fax: [phone]

## 2022-08-15 ENCOUNTER — NURSE ONLY (OUTPATIENT)
Dept: ENT CLINIC | Age: 27
End: 2022-08-15
Payer: COMMERCIAL

## 2022-08-15 DIAGNOSIS — J30.1 SEASONAL ALLERGIC RHINITIS DUE TO POLLEN: Primary | ICD-10-CM

## 2022-08-15 PROCEDURE — 95117 IMMUNOTHERAPY INJECTIONS: CPT | Performed by: OTOLARYNGOLOGY

## 2022-08-15 NOTE — PROGRESS NOTES
Correct serum vials verified by patient and nurse. After obtaining consent, and per orders of Dr Ruma Choudhury, injections of allergy serum given by MADHAV Faria RN. Last injection dose repeated. It has been 2 weeks since patient has had an injection. Patient instructed to remain in clinic for 30 minutes after injection, and to report any adverse reactions to me immediately. No change in patient status post injection.

## 2022-08-17 ENCOUNTER — HOSPITAL ENCOUNTER (OUTPATIENT)
Dept: PHYSICAL THERAPY | Age: 27
Setting detail: THERAPIES SERIES
Discharge: HOME | End: 2022-08-17
Payer: COMMERCIAL

## 2022-08-17 PROCEDURE — 97110 THERAPEUTIC EXERCISES: CPT

## 2022-08-17 PROCEDURE — 97140 MANUAL THERAPY 1/> REGIONS: CPT

## 2022-08-17 PROCEDURE — 20561 NDL INSJ W/O NJX 3+ MUSC: CPT

## 2022-08-17 NOTE — FLOWSHEET NOTE
[facility] [address] [facility]      Physical Therapy Treatment Note/ Progress Report:     Date:  2022    Patient Name:  Olga Huerta    :  1995  MRN: [id number]  Restrictions/Precautions:    Medical/Treatment Diagnosis Information:  Diagnosis: M25.512 L shoulder pain  Treatment Diagnosis: M25.512 L shoulder pain  Insurance/Certification information:   [facility]  Physician Information:  Referring Practitioner: Dr. Sin Dupont  Has the plan of care been signed (Y/N):        [x]  Yes  []  No     Date of Patient follow up with Physician: not scheduled    Is this a Progress Report:     []  Yes  [x]  No     If Yes:  Date Range for reporting period:  Beginning:  ------------ Ending:     Progress report will be due (10 Rx or 30 days whichever is less): 3/51/99    Recertification will be due (POC Duration  / 90 days whichever is less): 22 NEXT VISIT     Visit # Insurance Allowable Auth Required   In Person 1 16 visits (-10/1) [x]  Yes     []  No    Tele Health   []  Yes     []  No    Total 17 total       Functional Scale:   Date assessed:      Latex Allergy:  [x]NO      []YES  Preferred Language for Healthcare:   [x]English       []other:    Pain level:  2/10 shoulder blade region, anterior shoulder    SUBJECTIVE:  Pt reports she has felt great over the last 2 weeks. She has had minimal to no radiating symptoms. Note: pt contacted insurance and DN is covered and no pre-auth is required - NOTE REFERRAL FOR DN (Dr. Srini Skinner) NEXT VISIT    OBJECTIVE:   Observation:    Test measurements:    Dry needling manual therapy: consisted on the placement of 12 needles in the following muscles:  cervical multifidi C4, 5,6 LEFT and RIGHT, upper trap, levator scapulae,   A 60 mm needle was inserted, piston, rotated, and coned to produce intramuscular mobilization.   These techniques were used to restore functional range of motion, reduce muscle spasm and induce healing in the corresponding musculature. (60488)  Clean Technique was utilized today while applying Dry needling treatment. The treatment sites where cleaned with 70% solution of  isopropyl alcohol . The PT washed their hands and utilized treatment gloves along with hand  prior to inserting the needles. All needles where removed and discarded in the appropriate sharps container. Pt tolerated treatment well. Noted radiating/referred symptoms to L sided PSIS, to ear and eye (in distribution where pt feels her HA), and jaw. Note that this subj report has been common throughout her treatment with manual therapy.      RESTRICTIONS/PRECAUTIONS: none    Exercises/Interventions:   Therapeutic Ex (43079) Sets/sec Reps Notes/CUES HEP   Row 3 15 Black TB, Tried ext (? pain) x   IR/ER  No money 3  3 10  10 BlueTB  RTB X (blue)   pec S at wall 20\" 3  x   Open book 1, hold 5 sec 10 Less \"crunching\" this visit (after manual rx) x   horiz ABD 2-3 10 GTB, ? pain in anterior shoulder, pt continues to do this despite pain, does stop when pain gets sharp and focal x   Chin tuck 5\" 2x10  x   To R only, NV x                                 Note that pt uses lacrosse ball at home for self massage     Manual Intervention ([phone])            SOR, cervical up glides bilat x5'      Dry needling   See above     Radiating pain into head and neck, tingling into hand with pressure to LS, rhomboid/mid trap, able to lift shoulder blade away from rib cage this visit    STM upper trap, cervical paraspinals x8'  Note significant tightness lateral L neck     sitting    Thoracic manip x1  Cavitation noted    NMR re-education (02089)   CUES NEEDED                                                                   Therapeutic Activity (39003)                                          [employer] access code:    [email or web address]           Therapeutic Exercise and NMR EXR  [x] (97818) Provided verbal/tactile cueing for activities related to strengthening, flexibility, endurance, ROM  for improvements in scapular, scapulothoracic and UE control with self care, reaching, carrying, lifting, house/yardwork, driving/computer work.    [] (86958) Provided verbal/tactile cueing for activities related to improving balance, coordination, kinesthetic sense, posture, motor skill, proprioception  to assist with  scapular, scapulothoracic and UE control with self care, reaching, carrying, lifting, house/yardwork, driving/computer work. Therapeutic Activities:    [] (33053 or 25121) Provided verbal/tactile cueing for activities related to improving balance, coordination, kinesthetic sense, posture, motor skill, proprioception and motor activation to allow for proper function of scapular, scapulothoracic and UE control with self care, carrying, lifting, driving/computer work.      Home Exercise Program:    [x] (71214) Reviewed/Progressed HEP activities related to strengthening, flexibility, endurance, ROM of scapular, scapulothoracic and UE control with self care, reaching, carrying, lifting, house/yardwork, driving/computer work  [] (80708) Reviewed/Progressed HEP activities related to improving balance, coordination, kinesthetic sense, posture, motor skill, proprioception of scapular, scapulothoracic and UE control with self care, reaching, carrying, lifting, house/yardwork, driving/computer work      Manual Treatments:  PROM / STM / Oscillations-Mobs:  G-I, II, III, IV (PA's, Inf., Post.)  [x] (82417) Provided manual therapy to mobilize soft tissue/joints of cervical/CT, scapular GHJ and UE for the purpose of modulating pain, promoting relaxation,  increasing ROM, reducing/eliminating soft tissue swelling/inflammation/restriction, improving soft tissue extensibility and allowing for proper ROM for normal function with self care, reaching, carrying, lifting, house/yardwork, driving/computer work    Modalities:     [] GAME READY (VASO)- for significant edema, swelling, pain control. Charges:  Timed Code Treatment Minutes: 50   Total Treatment Minutes:  50      [] EVAL (LOW) 33140 (typically 20 minutes face-to-face)  [] EVAL (MOD) 44698 (typically 30 minutes face-to-face)  [] EVAL (HIGH) 19490 (typically 45 minutes face-to-face)  [] RE-EVAL     [x] QE(58292) x 2   [] IONTO  [] NMR (75351) x     [] VASO  [x] Manual (57161) x  1   [x] Other: dry needling  [] TA x      [] Mech Traction (11150)  [] ES(attended) (31680)      [] ES (un) (43641):    ASSESSMENT:  Pt reports significant improvement in the last couple of weeks with ? radiating symptoms and ? pain as well as ? strength. Pt will continue to benefit from ? strength and stability in kj scap musculature and RTC as well as improved thoracic and cervical mobiltiy. GOALS:     Patient stated goal: relieve some of the pain, be able to continue my new workout     Therapist goals for Patient:   Short Term Goals: To be achieved in: 2 weeks  1. Independent in HEP and progression per patient tolerance, in order to prevent re-injury. [] Progressing: [x] Met: [] Not Met: [] Adjusted      2. Patient will have a decrease in pain to facilitate improvement in movement, function, and ADLs as indicated by Functional Deficits. [] Progressing: [x] Met: [] Not Met: [] Adjusted      Long Term Goals: To be achieved in: 12 weeks  1. Disability index score of 10% or less for the DASH to assist with reaching prior level of function. [x] Progressing: [] Met: [] Not Met: [] Adjusted      2. Patient will demonstrate increased AROM to WNL to allow for proper joint functioning as indicated by patients Functional Deficits. [x] Progressing: [] Met: [] Not Met: [] Adjusted      3. Patient will demonstrate an increase in Strength to 5/5 to allow for proper functional mobility as indicated by patients Functional Deficits. [x] Progressing: [] Met: [] Not Met: [] Adjusted      4.  Patient will return to all functional activities without increased symptoms or restriction. [x] Progressing: [] Met: [] Not Met: [] Adjusted      5. Pt will exhibit self(patient specific functional goal)    [x] Progressing: [] Met: [] Not Met: [] Adjusted        Access Code: [email or web address]  URL: [email or web address]  Date: 03/14/2022  Prepared by: Betina Mc    Exercises  Standing Pec Stretch at [employer] [place] - 1 x daily - 7 x weekly - 1 sets - 3 reps - 20 sec hold  Standing Bilateral Low Shoulder Row with Anchored Resistance - 1 x daily - 7 x weekly - 3 sets - 10 reps  Standing Shoulder Internal Rotation with Anchored Resistance - 1 x daily - 7 x weekly - 3 sets - 10 reps  Shoulder External Rotation with Anchored Resistance - 1 x daily - 7 x weekly - 3 sets - 10 reps      Overall Progression Towards Functional goals/ Treatment Progress Update:  [x] Patient is progressing as expected towards functional goals listed. [] Progression is slowed due to complexities/Impairments listed. [] Progression has been slowed due to co-morbidities. [] Plan just implemented, too soon to assess goals progression <30days   [] Goals require adjustment due to lack of progress  [] Patient is not progressing as expected and requires additional follow up with physician  [] Other    Prognosis for POC: [x] Good [] Fair  [] Poor      Patient requires continued skilled intervention: [x] Yes  [] No    Treatment/Activity Tolerance:  [x] Patient able to complete treatment  [] Patient limited by fatigue  [] Patient limited by pain    [] Patient limited by other medical complications  [] Other:       PLAN: See eval  [x] Continue per plan of care [] Alter current plan (see comments above)  [] Plan of care initiated [] Hold pending MD visit [] Discharge    Electronically signed by:  Ermelinda Langford PT    Note: If patient does not return for scheduled/ recommended follow up visits, this note will serve as a discharge from care along with most recent update on progress.

## 2022-08-22 ENCOUNTER — NURSE ONLY (OUTPATIENT)
Dept: ENT CLINIC | Age: 27
End: 2022-08-22
Payer: COMMERCIAL

## 2022-08-22 DIAGNOSIS — J30.1 SEASONAL ALLERGIC RHINITIS DUE TO POLLEN: Primary | ICD-10-CM

## 2022-08-22 PROCEDURE — 95117 IMMUNOTHERAPY INJECTIONS: CPT | Performed by: OTOLARYNGOLOGY

## 2022-08-22 NOTE — PROGRESS NOTES
Decreasing dose this week due to pt sinus infection. Correct serum vials verified by patient and nurse. After obtaining consent, and per orders of Dr Audrey Gordon, injections of allergy serum given by MADHAV Faria RN. Patient instructed to remain in clinic for 30 minutes after injection, and to report any adverse reactions to me immediately. No change in patient status post injection.

## 2022-08-24 ENCOUNTER — HOSPITAL ENCOUNTER (OUTPATIENT)
Dept: PHYSICAL THERAPY | Age: 27
Setting detail: THERAPIES SERIES
Discharge: HOME | End: 2022-08-24
Payer: COMMERCIAL

## 2022-08-24 PROCEDURE — 97140 MANUAL THERAPY 1/> REGIONS: CPT

## 2022-08-24 PROCEDURE — 20561 NDL INSJ W/O NJX 3+ MUSC: CPT

## 2022-08-24 PROCEDURE — 97110 THERAPEUTIC EXERCISES: CPT

## 2022-08-24 NOTE — FLOWSHEET NOTE
[facility]      Physical Therapy Treatment Note/ Progress Report:     Date:  2022    Patient Name:  Olga Huerta    :  1995  MRN: [id number]  Restrictions/Precautions:    Medical/Treatment Diagnosis Information:  Diagnosis: M25.512 L shoulder pain  Treatment Diagnosis: M25.512 L shoulder pain  Insurance/Certification information:   [facility]  Physician Information:  Referring Practitioner: Dr. Sin Dupont  Has the plan of care been signed (Y/N):        [x]  Yes  []  No     Date of Patient follow up with Physician: not scheduled    Is this a Progress Report:     []  Yes  [x]  No     If Yes:  Date Range for reporting period:  Beginning:  ------------ Ending:     Progress report will be due (10 Rx or 30 days whichever is less): 53    Recertification will be due (POC Duration  / 90 days whichever is less): 22 NEXT VISIT     Visit # Insurance Allowable Auth Required   In Person 1 16 visits (-10/1) [x]  Yes     []  No    Tele Health   []  Yes     []  No    Total 17 total       Functional Scale:   Date assessed:      Latex Allergy:  [x]NO      []YES  Preferred Language for Healthcare:   [x]English       []other:    Pain level:  210 shoulder blade region, anterior shoulder    SUBJECTIVE:  Pt reports she has felt great over the last 2 weeks. She has had minimal to no radiating symptoms. Note: pt contacted insurance and DN is covered and no pre-auth is required - NOTE REFERRAL FOR DN (Dr. Srini Skinner) NEXT VISIT    OBJECTIVE:   Observation:    Test measurements:    Dry needling manual therapy: consisted on the placement of 12 needles in the following muscles:  cervical multifidi C4, 5,6 LEFT and RIGHT, upper trap, levator scapulae, suboccipitals    A 60 mm needle was inserted, piston, rotated, and coned to produce intramuscular mobilization.   These techniques were used to restore functional range of motion, reduce muscle spasm and induce healing in the corresponding musculature. (67567)  Clean Technique was utilized today while applying Dry needling treatment. The treatment sites where cleaned with 70% solution of  isopropyl alcohol . The PT washed their hands and utilized treatment gloves along with hand  prior to inserting the needles. All needles where removed and discarded in the appropriate sharps container. Pt tolerated treatment well. Noted radiating/referred symptoms to L sided PSIS, to ear and eye (in distribution where pt feels her HA), and jaw. Note that this subj report has been common throughout her treatment with manual therapy. Note a palpable \"knot\" in upper cervical paraspinals on L side after treatment, pt states it's very tender.     RESTRICTIONS/PRECAUTIONS: none    Exercises/Interventions:   Therapeutic Ex (20931) Sets/sec Reps Notes/CUES HEP   Row 3 15 Black TB, Tried ext (? pain) x   IR/ER  No money 3  3 10  10 BlueTB  RTB X (blue)   pec S at wall 20\" 3  x   Open book 1, hold 5 sec 10 Less \"crunching\" this visit (after manual rx) x   horiz ABD 2-3 10 GTB, ? pain in anterior shoulder, pt continues to do this despite pain, does stop when pain gets sharp and focal x   Chin tuck 5\" 2x10  x   LS S 10\" 5 To R only x                                 Note that pt uses lacrosse ball at home for self massage     Manual Intervention ([id number])            SOR, cervical up glides bilat x5'      Dry needling   See above    SL scap mobs,x5     x8'  Note significant tightness lateral L neck     sitting    Thoracic manip x1  No cavitation 8/24    NMR re-education (14714)   CUES NEEDED                                                                   Therapeutic Activity (51098)                                          ponUp access code:    [email or web address]           Therapeutic Exercise and NMR EXR  [x] (22580) Provided verbal/tactile cueing for activities related to strengthening, flexibility, endurance, ROM  for improvements in scapular, scapulothoracic and UE control with self care, reaching, carrying, lifting, house/yardwork, driving/computer work.    [] (09577) Provided verbal/tactile cueing for activities related to improving balance, coordination, kinesthetic sense, posture, motor skill, proprioception  to assist with  scapular, scapulothoracic and UE control with self care, reaching, carrying, lifting, house/yardwork, driving/computer work. Therapeutic Activities:    [] (31043 or 58094) Provided verbal/tactile cueing for activities related to improving balance, coordination, kinesthetic sense, posture, motor skill, proprioception and motor activation to allow for proper function of scapular, scapulothoracic and UE control with self care, carrying, lifting, driving/computer work. Home Exercise Program:    [x] (19867) Reviewed/Progressed HEP activities related to strengthening, flexibility, endurance, ROM of scapular, scapulothoracic and UE control with self care, reaching, carrying, lifting, house/yardwork, driving/computer work  [] (81263) Reviewed/Progressed HEP activities related to improving balance, coordination, kinesthetic sense, posture, motor skill, proprioception of scapular, scapulothoracic and UE control with self care, reaching, carrying, lifting, house/yardwork, driving/computer work      Manual Treatments:  PROM / STM / Oscillations-Mobs:  G-I, II, III, IV (PA's, Inf., Post.)  [x] (97572) Provided manual therapy to mobilize soft tissue/joints of cervical/CT, scapular GHJ and UE for the purpose of modulating pain, promoting relaxation,  increasing ROM, reducing/eliminating soft tissue swelling/inflammation/restriction, improving soft tissue extensibility and allowing for proper ROM for normal function with self care, reaching, carrying, lifting, house/yardwork, driving/computer work    Modalities:     [] GAME READY (VASO)- for significant edema, swelling, pain control.      Charges:  Timed Code Treatment Minutes: 50   Total Treatment Minutes:  50      [] EVAL (LOW) 66708 (typically 20 minutes face-to-face)  [] EVAL (MOD) 97121 (typically 30 minutes face-to-face)  [] EVAL (HIGH) 09438 (typically 45 minutes face-to-face)  [] RE-EVAL     [x] JI(37651) x 2   [] IONTO  [] NMR (07078) x     [] VASO  [x] Manual (63266) x  1   [x] Other: dry needling  [] TA x      [] Mech Traction (19008)  [] ES(attended) (57718)      [] ES (un) (63885):    ASSESSMENT:  Pt reports significant improvement in the last couple of weeks with ? radiating symptoms and ? pain as well as ? strength. Pt will continue to benefit from ? strength and stability in kj scap musculature and RTC as well as improved thoracic and cervical mobiltiy. GOALS:     Patient stated goal: relieve some of the pain, be able to continue my new workout     Therapist goals for Patient:   Short Term Goals: To be achieved in: 2 weeks  1. Independent in HEP and progression per patient tolerance, in order to prevent re-injury. [] Progressing: [x] Met: [] Not Met: [] Adjusted      2. Patient will have a decrease in pain to facilitate improvement in movement, function, and ADLs as indicated by Functional Deficits. [] Progressing: [x] Met: [] Not Met: [] Adjusted      Long Term Goals: To be achieved in: 12 weeks  1. Disability index score of 10% or less for the DASH to assist with reaching prior level of function. [x] Progressing: [] Met: [] Not Met: [] Adjusted      2. Patient will demonstrate increased AROM to WNL to allow for proper joint functioning as indicated by patients Functional Deficits. [x] Progressing: [] Met: [] Not Met: [] Adjusted      3. Patient will demonstrate an increase in Strength to 5/5 to allow for proper functional mobility as indicated by patients Functional Deficits. [x] Progressing: [] Met: [] Not Met: [] Adjusted      4. Patient will return to all functional activities without increased symptoms or restriction.    [x] Progressing: [] Met: [] Not Met: [] Adjusted      5. Pt will exhibit self(patient specific functional goal)    [x] Progressing: [] Met: [] Not Met: [] Adjusted        Access Code: [email or web address]  URL: [email or web address]  Date: 03/14/2022  Prepared by: Ricarda Buck    Exercises  Standing Pec Stretch at [place] - 1 x daily - 7 x weekly - 1 sets - 3 reps - 20 sec hold  Standing Bilateral Low Shoulder Row with Anchored Resistance - 1 x daily - 7 x weekly - 3 sets - 10 reps  Standing Shoulder Internal Rotation with Anchored Resistance - 1 x daily - 7 x weekly - 3 sets - 10 reps  Shoulder External Rotation with Anchored Resistance - 1 x daily - 7 x weekly - 3 sets - 10 reps      Overall Progression Towards Functional goals/ Treatment Progress Update:  [x] Patient is progressing as expected towards functional goals listed. [] Progression is slowed due to complexities/Impairments listed. [] Progression has been slowed due to co-morbidities. [] Plan just implemented, too soon to assess goals progression <30days   [] Goals require adjustment due to lack of progress  [] Patient is not progressing as expected and requires additional follow up with physician  [] Other    Prognosis for POC: [x] Good [] Fair  [] Poor      Patient requires continued skilled intervention: [x] Yes  [] No    Treatment/Activity Tolerance:  [x] Patient able to complete treatment  [] Patient limited by fatigue  [] Patient limited by pain    [] Patient limited by other medical complications  [] Other:       PLAN: See eval  [x] Continue per plan of care [] Alter current plan (see comments above)  [] Plan of care initiated [] Hold pending MD visit [] Discharge    Electronically signed by:  Abbie Black PT    Note: If patient does not return for scheduled/ recommended follow up visits, this note will serve as a discharge from care along with most recent update on progress.

## 2022-08-31 ENCOUNTER — HOSPITAL ENCOUNTER (OUTPATIENT)
Dept: PHYSICAL THERAPY | Age: 27
Setting detail: THERAPIES SERIES
Discharge: HOME | End: 2022-08-31
Payer: COMMERCIAL

## 2022-08-31 PROCEDURE — 97110 THERAPEUTIC EXERCISES: CPT

## 2022-08-31 PROCEDURE — 20561 NDL INSJ W/O NJX 3+ MUSC: CPT

## 2022-08-31 PROCEDURE — 97140 MANUAL THERAPY 1/> REGIONS: CPT

## 2022-08-31 NOTE — FLOWSHEET NOTE
[facility]      Physical Therapy Treatment Note/ Progress Report:     Date:  2022    Patient Name:  Olga Huerta    :  1995  MRN: [id number]  Restrictions/Precautions:    Medical/Treatment Diagnosis Information:  Diagnosis: M25.512 L shoulder pain  Treatment Diagnosis: M25.512 L shoulder pain  Insurance/Certification information:   [facility]  Physician Information:  Referring Practitioner: Dr. Sin Dupont  Has the plan of care been signed (Y/N):        [x]  Yes  []  No     Date of Patient follow up with Physician: not scheduled    Is this a Progress Report:     []  Yes  [x]  No     If Yes:  Date Range for reporting period:  Beginning:  ------------ Ending:     Progress report will be due (10 Rx or 30 days whichever is less): 4/3/58    Recertification will be due (POC Duration  / 90 days whichever is less): 22     Visit # Insurance Allowable Auth Required   In Person 2 16 visits (-10/1) [x]  Yes     []  No    Tele Health   []  Yes     []  No    Total 17 total       Functional Scale:   Date assessed:      Latex Allergy:  [x]NO      []YES  Preferred Language for Healthcare:   [x]English       []other:    Pain level:  3/10 shoulder blade region (thinks she slept on it wrong)    SUBJECTIVE:  Pt reports the \" knot\" she felt in proximal neck after last visit lasted maybe 48 hours. Pt has not had any HA. She     Note: pt contacted insurance and DN is covered and no pre-auth is required - NOTE REFERRAL FOR DN (Dr. Srini Skinner) NEXT VISIT    OBJECTIVE:   Observation:    Test measurements:    Dry needling manual therapy: consisted on the placement of 12 needles in the following muscles:  cervical multifidi C4, 5,6 LEFT and RIGHT, upper trap, levator scapulae, suboccipitals    A 60 mm needle was inserted, piston, rotated, and coned to produce intramuscular mobilization.   These techniques were used to restore functional range of motion, reduce muscle spasm and induce healing in the corresponding musculature. (38637)  Clean Technique was utilized today while applying Dry needling treatment. The treatment sites where cleaned with 70% solution of  isopropyl alcohol . The PT washed their hands and utilized treatment gloves along with hand  prior to inserting the needles. All needles where removed and discarded in the appropriate sharps container. Pt tolerated treatment well. Noted radiating/referred symptoms to L sided PSIS, to ear and eye (in distribution where pt feels her HA), and jaw. Note that this subj report has been common throughout her treatment with manual therapy.     RESTRICTIONS/PRECAUTIONS: none    Exercises/Interventions:   Therapeutic Ex (70983) Sets/sec Reps Notes/CUES HEP   Row 3 15 Black TB, Tried ext (? pain) x   IR/ER  No money 3  3 10  10 BlueTB  RTB X (blue)   pec S at wall 20\" 3  x   Open book 1, hold 5 sec 10 Less \"crunching\" this visit (after manual rx) x   horiz ABD 2-3 10 GTB, ? pain in anterior shoulder, pt continues to do this despite pain, does stop when pain gets sharp and focal x   Chin tuck 5\" 2x10  x   LS S 10\" 5 To R only x                                 Note that pt uses lacrosse ball at home for self massage     Manual Intervention ([id number])            SOR, cervical up glides bilat x5'      Dry needling   See above    SL scap mobs,x5     x8'  Note significant tightness lateral L neck     sitting    Thoracic manip x1  No cavitation 8/24    NMR re-education (23385)   CUES NEEDED                                                                   Therapeutic Activity (71384)                                          [place] access code:    [email or web address]           Therapeutic Exercise and NMR EXR  [x] (88962) Provided verbal/tactile cueing for activities related to strengthening, flexibility, endurance, ROM  for improvements in scapular, scapulothoracic and UE control with self care, reaching, carrying, lifting, house/yardwork, driving/computer work.    [] (09430) Provided verbal/tactile cueing for activities related to improving balance, coordination, kinesthetic sense, posture, motor skill, proprioception  to assist with  scapular, scapulothoracic and UE control with self care, reaching, carrying, lifting, house/yardwork, driving/computer work. Therapeutic Activities:    [] (63387 or 26790) Provided verbal/tactile cueing for activities related to improving balance, coordination, kinesthetic sense, posture, motor skill, proprioception and motor activation to allow for proper function of scapular, scapulothoracic and UE control with self care, carrying, lifting, driving/computer work. Home Exercise Program:    [x] (70017) Reviewed/Progressed HEP activities related to strengthening, flexibility, endurance, ROM of scapular, scapulothoracic and UE control with self care, reaching, carrying, lifting, house/yardwork, driving/computer work  [] (64751) Reviewed/Progressed HEP activities related to improving balance, coordination, kinesthetic sense, posture, motor skill, proprioception of scapular, scapulothoracic and UE control with self care, reaching, carrying, lifting, house/yardwork, driving/computer work      Manual Treatments:  PROM / STM / Oscillations-Mobs:  G-I, II, III, IV (PA's, Inf., Post.)  [x] (56829) Provided manual therapy to mobilize soft tissue/joints of cervical/CT, scapular GHJ and UE for the purpose of modulating pain, promoting relaxation,  increasing ROM, reducing/eliminating soft tissue swelling/inflammation/restriction, improving soft tissue extensibility and allowing for proper ROM for normal function with self care, reaching, carrying, lifting, house/yardwork, driving/computer work    Modalities:     [] GAME READY (VASO)- for significant edema, swelling, pain control.      Charges:  Timed Code Treatment Minutes: 50   Total Treatment Minutes:  50      [] EVAL (LOW) 78456 (typically 20 minutes face-to-face)  [] EVAL (MOD) 21789 (typically 30 minutes face-to-face)  [] EVAL (HIGH) 55486 (typically 45 minutes face-to-face)  [] RE-EVAL     [x] WL(66372) x 2   [] IONTO  [] NMR (45155) x     [] VASO  [x] Manual (37279) x  1   [x] Other: dry needling  [] TA x      [] Mech Traction (47649)  [] ES(attended) (79670)      [] ES (un) (31323):    ASSESSMENT:  Pt reports significant improvement in the last couple of weeks with ? radiating symptoms and ? pain as well as ? strength. Pt will continue to benefit from ? strength and stability in kj scap musculature and RTC as well as improved thoracic and cervical mobiltiy. GOALS:     Patient stated goal: relieve some of the pain, be able to continue my new workout     Therapist goals for Patient:   Short Term Goals: To be achieved in: 2 weeks  1. Independent in HEP and progression per patient tolerance, in order to prevent re-injury. [] Progressing: [x] Met: [] Not Met: [] Adjusted      2. Patient will have a decrease in pain to facilitate improvement in movement, function, and ADLs as indicated by Functional Deficits. [] Progressing: [x] Met: [] Not Met: [] Adjusted      Long Term Goals: To be achieved in: 12 weeks  1. Disability index score of 10% or less for the DASH to assist with reaching prior level of function. [x] Progressing: [] Met: [] Not Met: [] Adjusted      2. Patient will demonstrate increased AROM to WNL to allow for proper joint functioning as indicated by patients Functional Deficits. [x] Progressing: [] Met: [] Not Met: [] Adjusted      3. Patient will demonstrate an increase in Strength to 5/5 to allow for proper functional mobility as indicated by patients Functional Deficits. [x] Progressing: [] Met: [] Not Met: [] Adjusted      4. Patient will return to all functional activities without increased symptoms or restriction. [x] Progressing: [] Met: [] Not Met: [] Adjusted      5.  Pt will exhibit self(patient specific functional goal) [x] Progressing: [] Met: [] Not Met: [] Adjusted        Access Code: [email or web address]  URL: [email or web address]  Date: 03/14/2022  Prepared by: Betina Mc    Exercises  Standing Pec Stretch at [place] - 1 x daily - 7 x weekly - 1 sets - 3 reps - 20 sec hold  Standing Bilateral Low Shoulder Row with Anchored Resistance - 1 x daily - 7 x weekly - 3 sets - 10 reps  Standing Shoulder Internal Rotation with Anchored Resistance - 1 x daily - 7 x weekly - 3 sets - 10 reps  Shoulder External Rotation with Anchored Resistance - 1 x daily - 7 x weekly - 3 sets - 10 reps      Overall Progression Towards Functional goals/ Treatment Progress Update:  [x] Patient is progressing as expected towards functional goals listed. [] Progression is slowed due to complexities/Impairments listed. [] Progression has been slowed due to co-morbidities. [] Plan just implemented, too soon to assess goals progression <30days   [] Goals require adjustment due to lack of progress  [] Patient is not progressing as expected and requires additional follow up with physician  [] Other    Prognosis for POC: [x] Good [] Fair  [] Poor      Patient requires continued skilled intervention: [x] Yes  [] No    Treatment/Activity Tolerance:  [x] Patient able to complete treatment  [] Patient limited by fatigue  [] Patient limited by pain    [] Patient limited by other medical complications  [] Other:       PLAN: See eval  [x] Continue per plan of care [] Alter current plan (see comments above)  [] Plan of care initiated [] Hold pending MD visit [] Discharge    Electronically signed by:  Ermelinda Langford PT    Note: If patient does not return for scheduled/ recommended follow up visits, this note will serve as a discharge from care along with most recent update on progress.

## 2022-09-12 ENCOUNTER — NURSE ONLY (OUTPATIENT)
Dept: ENT CLINIC | Age: 27
End: 2022-09-12
Payer: COMMERCIAL

## 2022-09-12 DIAGNOSIS — J30.1 SEASONAL ALLERGIC RHINITIS DUE TO POLLEN: Primary | ICD-10-CM

## 2022-09-12 PROCEDURE — 95117 IMMUNOTHERAPY INJECTIONS: CPT | Performed by: OTOLARYNGOLOGY

## 2022-09-12 NOTE — PROGRESS NOTES
Patient has missed 3 weeks since last injection. Doses decreased today. Correct serum vials verified by patient and nurse. After obtaining consent, and per orders of Dr Eldon Foote, injections of allergy serum given by MADHAV Faria RN. Patient instructed to remain in clinic for 30 minutes after injection, and to report any adverse reactions to me immediately. No change in patient status post injection.

## 2022-09-14 ENCOUNTER — HOSPITAL ENCOUNTER (OUTPATIENT)
Dept: PHYSICAL THERAPY | Age: 27
Setting detail: THERAPIES SERIES
Discharge: HOME | End: 2022-09-14
Payer: COMMERCIAL

## 2022-09-14 PROCEDURE — 97110 THERAPEUTIC EXERCISES: CPT

## 2022-09-14 PROCEDURE — 97140 MANUAL THERAPY 1/> REGIONS: CPT

## 2022-09-14 NOTE — FLOWSHEET NOTE
[facility]      Physical Therapy Treatment Note/ Progress Report:     Date:  2022    Patient Name:  Charmaine Bailey    :  1995  MRN: [id number]  Restrictions/Precautions:    Medical/Treatment Diagnosis Information:  Diagnosis: M25.512 L shoulder pain  Treatment Diagnosis: M25.512 L shoulder pain  Insurance/Certification information:   [facility]  Physician Information:  Referring Practitioner: Dr. Poornima Walls  Has the plan of care been signed (Y/N):        [x]  Yes  []  No     Date of Patient follow up with Physician: not scheduled    Is this a Progress Report:     []  Yes  [x]  No     If Yes:  Date Range for reporting period:  Beginning:  ------------ Ending:     Progress report will be due (10 Rx or 30 days whichever is less): 40    Recertification will be due (POC Duration  / 90 days whichever is less): 22     Visit # Insurance Allowable Auth Required   In Person 2 16 visits (-10/1) [x]  Yes     []  No    Tele Health   []  Yes     []  No    Total 17 total       Functional Scale:   Date assessed:      Latex Allergy:  [x]NO      []YES  Preferred Language for Healthcare:   [x]English       []other:    Pain level:  3/10 shoulder blade region (thinks she slept on it wrong)    SUBJECTIVE:  Pt states she had a rough couple of weeks due to personal issues. Pt states her neck and shoulder don't feel too bad right now though, just tight from pole fitness class last night. Note: pt contacted insurance and DN is covered and no pre-auth is required - NOTE REFERRAL FOR DN (Dr. Anna Loo) NEXT VISIT    OBJECTIVE:   Observation:    Test measurements:    Dry needling manual therapy: consisted on the placement of 12 needles in the following muscles:  cervical multifidi C4, 5,6 LEFT and RIGHT, upper trap, levator scapulae, suboccipitals    A 60 mm needle was inserted, piston, rotated, and coned to produce intramuscular mobilization.   These techniques were used to restore functional range of motion, reduce muscle spasm and induce healing in the corresponding musculature. (12075)  Clean Technique was utilized today while applying Dry needling treatment. The treatment sites where cleaned with 70% solution of  isopropyl alcohol . The PT washed their hands and utilized treatment gloves along with hand  prior to inserting the needles. All needles where removed and discarded in the appropriate sharps container. Pt tolerated treatment well. Noted radiating/referred symptoms to L sided PSIS, to ear and eye (in distribution where pt feels her HA), and jaw. Note that this subj report has been common throughout her treatment with manual therapy.     RESTRICTIONS/PRECAUTIONS: none    Exercises/Interventions:   Therapeutic Ex (02062) Sets/sec Reps Notes/CUES HEP   Row 3 15 Black TB, Tried ext (? pain) x   IR/ER  No money 3  3 10  10 BlueTB  GTB X (blue)   pec S at wall 20\" 3  x   Open book 1, hold 5 sec 10 Less \"crunching\" this visit (after manual rx) x   horiz ABD 2-3 10 Supine this visit x   Chin tuck 5\" 2x10  x   LS S 10\" 5 To R only x                                 Note that pt uses lacrosse ball at home for self massage     Manual Intervention ([id number])            SOR, cervical up glides bilat x5'      Dry needling   See above    SL scap mobs,x5     x8'  Note significant tightness lateral L neck     sitting    Thoracic manip x1  No cavitation 8/24    NMR re-education (77977)   CUES NEEDED                                                                   Therapeutic Activity (27631)                                          Tam[employer] access code:    [email or web address]           Therapeutic Exercise and NMR EXR  [x] (84499) Provided verbal/tactile cueing for activities related to strengthening, flexibility, endurance, ROM  for improvements in scapular, scapulothoracic and UE control with self care, reaching, carrying, lifting, house/yardwork, driving/computer work.    [] (21693) Provided verbal/tactile cueing for activities related to improving balance, coordination, kinesthetic sense, posture, motor skill, proprioception  to assist with  scapular, scapulothoracic and UE control with self care, reaching, carrying, lifting, house/yardwork, driving/computer work. Therapeutic Activities:    [] (72086 or 83761) Provided verbal/tactile cueing for activities related to improving balance, coordination, kinesthetic sense, posture, motor skill, proprioception and motor activation to allow for proper function of scapular, scapulothoracic and UE control with self care, carrying, lifting, driving/computer work. Home Exercise Program:    [x] (36456) Reviewed/Progressed HEP activities related to strengthening, flexibility, endurance, ROM of scapular, scapulothoracic and UE control with self care, reaching, carrying, lifting, house/yardwork, driving/computer work  [] (06974) Reviewed/Progressed HEP activities related to improving balance, coordination, kinesthetic sense, posture, motor skill, proprioception of scapular, scapulothoracic and UE control with self care, reaching, carrying, lifting, house/yardwork, driving/computer work      Manual Treatments:  PROM / STM / Oscillations-Mobs:  G-I, II, III, IV (PA's, Inf., Post.)  [x] (73386) Provided manual therapy to mobilize soft tissue/joints of cervical/CT, scapular GHJ and UE for the purpose of modulating pain, promoting relaxation,  increasing ROM, reducing/eliminating soft tissue swelling/inflammation/restriction, improving soft tissue extensibility and allowing for proper ROM for normal function with self care, reaching, carrying, lifting, house/yardwork, driving/computer work    Modalities:     [] GAME READY (VASO)- for significant edema, swelling, pain control.      Charges:  Timed Code Treatment Minutes: 50   Total Treatment Minutes:  50      [] EVAL (LOW) 61381 (typically 20 minutes face-to-face)  [] EVAL (MOD) 46047 (typically 30 minutes face-to-face)  [] EVAL (HIGH) 12087 (typically 45 minutes face-to-face)  [] RE-EVAL     [x] UK(82727) x 2   [] IONTO  [] NMR (48346) x     [] VASO  [x] Manual (16671) x  1   [x] Other: dry needling  [] TA x      [] Mech Traction (92033)  [] ES(attended) (64463)      [] ES (un) (95850):    ASSESSMENT:  Pt reports significant improvement in the last couple of weeks with ? radiating symptoms and ? pain as well as ? strength. Pt will continue to benefit from ? strength and stability in kj scap musculature and RTC as well as improved thoracic and cervical mobiltiy. GOALS:     Patient stated goal: relieve some of the pain, be able to continue my new workout     Therapist goals for Patient:   Short Term Goals: To be achieved in: 2 weeks  1. Independent in HEP and progression per patient tolerance, in order to prevent re-injury. [] Progressing: [x] Met: [] Not Met: [] Adjusted      2. Patient will have a decrease in pain to facilitate improvement in movement, function, and ADLs as indicated by Functional Deficits. [] Progressing: [x] Met: [] Not Met: [] Adjusted      Long Term Goals: To be achieved in: 12 weeks  1. Disability index score of 10% or less for the DASH to assist with reaching prior level of function. [x] Progressing: [] Met: [] Not Met: [] Adjusted      2. Patient will demonstrate increased AROM to WNL to allow for proper joint functioning as indicated by patients Functional Deficits. [x] Progressing: [] Met: [] Not Met: [] Adjusted      3. Patient will demonstrate an increase in Strength to 5/5 to allow for proper functional mobility as indicated by patients Functional Deficits. [x] Progressing: [] Met: [] Not Met: [] Adjusted      4. Patient will return to all functional activities without increased symptoms or restriction. [x] Progressing: [] Met: [] Not Met: [] Adjusted      5.  Pt will exhibit self(patient specific functional goal)    [x] Progressing: [] Met: [] Not Met: [] Adjusted        Access Code: [email or web address]  URL: [email or web address]  Date: 03/14/2022  Prepared by: Ruben Leblanc    Exercises  Standing Pec Stretch at [address] - 1 x daily - 7 x weekly - 1 sets - 3 reps - 20 sec hold  Standing Bilateral Low Shoulder Row with Anchored Resistance - 1 x daily - 7 x weekly - 3 sets - 10 reps  Standing Shoulder Internal Rotation with Anchored Resistance - 1 x daily - 7 x weekly - 3 sets - 10 reps  Shoulder External Rotation with Anchored Resistance - 1 x daily - 7 x weekly - 3 sets - 10 reps      Overall Progression Towards Functional goals/ Treatment Progress Update:  [x] Patient is progressing as expected towards functional goals listed. [] Progression is slowed due to complexities/Impairments listed. [] Progression has been slowed due to co-morbidities. [] Plan just implemented, too soon to assess goals progression <30days   [] Goals require adjustment due to lack of progress  [] Patient is not progressing as expected and requires additional follow up with physician  [] Other    Prognosis for POC: [x] Good [] Fair  [] Poor      Patient requires continued skilled intervention: [x] Yes  [] No    Treatment/Activity Tolerance:  [x] Patient able to complete treatment  [] Patient limited by fatigue  [] Patient limited by pain    [] Patient limited by other medical complications  [] Other:       PLAN: See eval  [x] Continue per plan of care [] Alter current plan (see comments above)  [] Plan of care initiated [] Hold pending MD visit [] Discharge    Electronically signed by:  Bob Rizzo PT    Note: If patient does not return for scheduled/ recommended follow up visits, this note will serve as a discharge from care along with most recent update on progress.

## 2022-09-19 ENCOUNTER — NURSE ONLY (OUTPATIENT)
Dept: ENT CLINIC | Age: 27
End: 2022-09-19
Payer: COMMERCIAL

## 2022-09-19 DIAGNOSIS — J30.1 SEASONAL ALLERGIC RHINITIS DUE TO POLLEN: Primary | ICD-10-CM

## 2022-09-19 PROCEDURE — 95117 IMMUNOTHERAPY INJECTIONS: CPT | Performed by: OTOLARYNGOLOGY

## 2022-09-19 NOTE — PROGRESS NOTES
Correct serum vials verified by patient and nurse. After obtaining consent, and per orders of Dr Danielito Nevarez, injections of allergy serum given by MADHAV Faria RN. Patient instructed to remain in clinic for 30 minutes after injection, and to report any adverse reactions to me immediately. No change in patient status post injection.

## 2022-09-21 LAB — PAP SMEAR: NORMAL

## 2022-09-28 ENCOUNTER — HOSPITAL ENCOUNTER (OUTPATIENT)
Dept: PHYSICAL THERAPY | Age: 27
Setting detail: THERAPIES SERIES
Discharge: HOME | End: 2022-09-28
Payer: COMMERCIAL

## 2022-09-28 PROCEDURE — 97140 MANUAL THERAPY 1/> REGIONS: CPT

## 2022-09-28 PROCEDURE — 20561 NDL INSJ W/O NJX 3+ MUSC: CPT

## 2022-09-28 PROCEDURE — 97110 THERAPEUTIC EXERCISES: CPT

## 2022-09-28 NOTE — FLOWSHEET NOTE
The [address] [place] [address]      Physical Therapy Treatment Note/ Progress Report:     Date:  2022    Patient Name:  Ellen Wisdom    :  1995  MRN: [id number]  Restrictions/Precautions:    Medical/Treatment Diagnosis Information:  Diagnosis: M25.512 L shoulder pain  Treatment Diagnosis: M25.512 L shoulder pain  Insurance/Certification information:   [facility]  Physician Information:  Referring Practitioner: Dr. Pérez Miss  Has the plan of care been signed (Y/N):        [x]  Yes  []  No     Date of Patient follow up with Physician: not scheduled    Is this a Progress Report:     []  Yes  [x]  No     If Yes:  Date Range for reporting period:  Beginning:  ------------ Ending:     Progress report will be due (10 Rx or 30 days whichever is less):     Recertification will be due (POC Duration  / 90 days whichever is less): 22     Visit # Insurance Allowable Auth Required   In Person 3 16 visits (-10/1) [x]  Yes     []  No    Tele Health   []  Yes     []  No    Total 18 total       Functional Scale:   Date assessed:      Latex Allergy:  [x]NO      []YES  Preferred Language for Healthcare:   [x]English       []other:    Pain level:  1/10 shoulder blade    SUBJECTIVE:  Pt reports she had a HA on Saturday that did resolve with advil. Pt just reports tightness in shoulder blade region. No pain in anterior shoulder and no issues with N/T. Pt has felt more stiffness in the past few days. Note: pt contacted insurance and DN is covered and no pre-auth is required - NOTE REFERRAL FOR DN (Dr. Shasha Vanegas) NEXT VISIT    OBJECTIVE:   Observation:    Test measurements:    Dry needling manual therapy: consisted on the placement of 12 needles in the following muscles:  cervical multifidi C4, 5,6 LEFT and RIGHT, upper trap, levator scapulae,    A 60 mm needle was inserted, piston, rotated, and coned to produce intramuscular mobilization.   These techniques were used to restore functional range of motion, reduce muscle spasm and induce healing in the corresponding musculature. (12156)  Clean Technique was utilized today while applying Dry needling treatment. The treatment sites where cleaned with 70% solution of  isopropyl alcohol . The PT washed their hands and utilized treatment gloves along with hand  prior to inserting the needles. All needles where removed and discarded in the appropriate sharps container. Pt tolerated treatment well. Noted radiating/referred symptoms to L sided PSIS, to ear and eye (in distribution where pt feels her HA), and jaw. Note that this subj report has been common throughout her treatment with manual therapy. Significant twitch noted in L UT this visit.      RESTRICTIONS/PRECAUTIONS: none    Exercises/Interventions:   Therapeutic Ex (52211) Sets/sec Reps Notes/CUES HEP   Row 3 15 Black TB, Tried ext (? pain) x   IR/ER  No money 3  3 10  10 BlueTB  GTB X (blue)   pec S at wall 20\" 3  x   x   horiz ABD 2-3 10 Supine this visit, GTB x   Chin tuck 5\" 2x10  x   LS S 10\" 5 To R only x                                 Note that pt uses lacrosse ball at home for self massage     Manual Intervention (38203)            SOR, cervical up glides bilat x5'      Dry needling   See above    x5     x8'  Note significant tightness lateral L neck    [place]  to kj scap musculature x4'  sitting    x1  No cavitation 8/24    NMR re-education (45203)   CUES NEEDED                                                                   Therapeutic Activity (50549)                                          TRUEC[employer] access code:    [email or web address]           Therapeutic Exercise and NMR EXR  [x] (45715) Provided verbal/tactile cueing for activities related to strengthening, flexibility, endurance, ROM  for improvements in scapular, scapulothoracic and UE control with self care, reaching, carrying, lifting, house/yardwork, driving/computer work.    [] (54594) Provided verbal/tactile cueing for activities related to improving balance, coordination, kinesthetic sense, posture, motor skill, proprioception  to assist with  scapular, scapulothoracic and UE control with self care, reaching, carrying, lifting, house/yardwork, driving/computer work. Therapeutic Activities:    [] (86005 or 99132) Provided verbal/tactile cueing for activities related to improving balance, coordination, kinesthetic sense, posture, motor skill, proprioception and motor activation to allow for proper function of scapular, scapulothoracic and UE control with self care, carrying, lifting, driving/computer work. Home Exercise Program:    [x] (49239) Reviewed/Progressed HEP activities related to strengthening, flexibility, endurance, ROM of scapular, scapulothoracic and UE control with self care, reaching, carrying, lifting, house/yardwork, driving/computer work  [] (96533) Reviewed/Progressed HEP activities related to improving balance, coordination, kinesthetic sense, posture, motor skill, proprioception of scapular, scapulothoracic and UE control with self care, reaching, carrying, lifting, house/yardwork, driving/computer work      Manual Treatments:  PROM / STM / Oscillations-Mobs:  G-I, II, III, IV (PA's, Inf., Post.)  [x] (31838) Provided manual therapy to mobilize soft tissue/joints of cervical/CT, scapular GHJ and UE for the purpose of modulating pain, promoting relaxation,  increasing ROM, reducing/eliminating soft tissue swelling/inflammation/restriction, improving soft tissue extensibility and allowing for proper ROM for normal function with self care, reaching, carrying, lifting, house/yardwork, driving/computer work    Modalities:  Electrical Stimulation for pain control, swelling reduction. Waveform: premod; Cycle Time: Continuous; Frequency: 100 pps; Polarity: Negative; Ramp: 2 sec; Amplitude: 7.4 Volts Treatment time: 10 min.    [] GAME READY (VASO)- for significant edema, swelling, pain control. Charges:  Timed Code Treatment Minutes: 50   Total Treatment Minutes:  50      [] EVAL (LOW) 94667 (typically 20 minutes face-to-face)  [] EVAL (MOD) 85417 (typically 30 minutes face-to-face)  [] EVAL (HIGH) 93620 (typically 45 minutes face-to-face)  [] RE-EVAL     [x] HK(82067) x 2   [] IONTO  [] NMR (15818) x     [] VASO  [x] Manual (34510) x  1   [x] Other: dry needling  [] TA x      [] Mech Traction (95272)  [] ES(attended) (05635)      [] ES (un) (63725):    ASSESSMENT:  Pt reports significant improvement in the last couple of weeks with ? radiating symptoms and ? pain as well as ? strength. Pt will continue to benefit from ? strength and stability in kj scap musculature and RTC as well as improved thoracic and cervical mobiltiy. GOALS:     Patient stated goal: relieve some of the pain, be able to continue my new workout     Therapist goals for Patient:   Short Term Goals: To be achieved in: 2 weeks  1. Independent in HEP and progression per patient tolerance, in order to prevent re-injury. [] Progressing: [x] Met: [] Not Met: [] Adjusted      2. Patient will have a decrease in pain to facilitate improvement in movement, function, and ADLs as indicated by Functional Deficits. [] Progressing: [x] Met: [] Not Met: [] Adjusted      Long Term Goals: To be achieved in: 12 weeks  1. Disability index score of 10% or less for the DASH to assist with reaching prior level of function. [x] Progressing: [] Met: [] Not Met: [] Adjusted      2. Patient will demonstrate increased AROM to WNL to allow for proper joint functioning as indicated by patients Functional Deficits. [x] Progressing: [] Met: [] Not Met: [] Adjusted      3. Patient will demonstrate an increase in Strength to 5/5 to allow for proper functional mobility as indicated by patients Functional Deficits. [x] Progressing: [] Met: [] Not Met: [] Adjusted      4.  Patient will return to all functional activities without increased symptoms or restriction. [x] Progressing: [] Met: [] Not Met: [] Adjusted      5. Pt will exhibit self(patient specific functional goal)    [x] Progressing: [] Met: [] Not Met: [] Adjusted        Access Code: [email or web address]  URL: [email or web address]  Date: 03/14/2022  Prepared by: Jonas Amin    Exercises  Standing Pec Stretch at [place] - 1 x daily - 7 x weekly - 1 sets - 3 reps - 20 sec hold  Standing Bilateral Low Shoulder Row with Anchored Resistance - 1 x daily - 7 x weekly - 3 sets - 10 reps  Standing Shoulder Internal Rotation with Anchored Resistance - 1 x daily - 7 x weekly - 3 sets - 10 reps  Shoulder External Rotation with Anchored Resistance - 1 x daily - 7 x weekly - 3 sets - 10 reps      Overall Progression Towards Functional goals/ Treatment Progress Update:  [x] Patient is progressing as expected towards functional goals listed. [] Progression is slowed due to complexities/Impairments listed. [] Progression has been slowed due to co-morbidities. [] Plan just implemented, too soon to assess goals progression <30days   [] Goals require adjustment due to lack of progress  [] Patient is not progressing as expected and requires additional follow up with physician  [] Other    Prognosis for POC: [x] Good [] Fair  [] Poor      Patient requires continued skilled intervention: [x] Yes  [] No    Treatment/Activity Tolerance:  [x] Patient able to complete treatment  [] Patient limited by fatigue  [] Patient limited by pain    [] Patient limited by other medical complications  [] Other:       PLAN: See eval  [x] Continue per plan of care [] Alter current plan (see comments above)  [] Plan of care initiated [] Hold pending MD visit [] Discharge    Electronically signed by:  Kings Thomas PT    Note: If patient does not return for scheduled/ recommended follow up visits, this note will serve as a discharge from care along with most recent update on progress.

## 2022-09-30 RX ORDER — TOPIRAMATE 100 MG/1
TABLET, FILM COATED ORAL
Qty: 180 TABLET | Refills: 0 | Status: SHIPPED | OUTPATIENT
Start: 2022-09-30 | End: 2022-10-04

## 2022-09-30 NOTE — TELEPHONE ENCOUNTER
Medication:   Requested Prescriptions     Pending Prescriptions Disp Refills    topiramate (TOPAMAX) 100 MG tablet [Pharmacy Med Name: TOPIRAMATE 100 MG TABLET] 180 tablet 0     Sig: TAKE 1 TABLET BY MOUTH TWICE A DAY     Last Filled:  6/7/22    Last appt: 6/29/2022   Next appt: 10/4/2022

## 2022-10-04 ENCOUNTER — OFFICE VISIT (OUTPATIENT)
Dept: PRIMARY CARE CLINIC | Age: 27
End: 2022-10-04
Payer: COMMERCIAL

## 2022-10-04 VITALS
DIASTOLIC BLOOD PRESSURE: 80 MMHG | HEIGHT: 61 IN | BODY MASS INDEX: 33.27 KG/M2 | TEMPERATURE: 97.3 F | OXYGEN SATURATION: 97 % | HEART RATE: 76 BPM | WEIGHT: 176.2 LBS | SYSTOLIC BLOOD PRESSURE: 138 MMHG

## 2022-10-04 DIAGNOSIS — N64.3 GALACTORRHEA: ICD-10-CM

## 2022-10-04 DIAGNOSIS — Z00.00 ENCOUNTER FOR WELL ADULT EXAM WITHOUT ABNORMAL FINDINGS: Primary | ICD-10-CM

## 2022-10-04 DIAGNOSIS — R79.89 ABNORMAL TSH: ICD-10-CM

## 2022-10-04 DIAGNOSIS — R63.5 WEIGHT GAIN: ICD-10-CM

## 2022-10-04 PROBLEM — F60.3 BORDERLINE PERSONALITY DISORDER (HCC): Status: ACTIVE | Noted: 2022-10-04

## 2022-10-04 PROBLEM — F60.3 BORDERLINE PERSONALITY DISORDER (HCC): Status: RESOLVED | Noted: 2022-10-04 | Resolved: 2022-10-04

## 2022-10-04 LAB
BILIRUBIN, POC: NORMAL
BLOOD URINE, POC: NORMAL
CLARITY, POC: CLEAR
COLOR, POC: NORMAL
GLUCOSE URINE, POC: NORMAL
KETONES, POC: NORMAL
LEUKOCYTE EST, POC: NORMAL
NITRITE, POC: NORMAL
PH, POC: 6
PROTEIN, POC: NORMAL
SPECIFIC GRAVITY, POC: 1.03
UROBILINOGEN, POC: 0.2

## 2022-10-04 PROCEDURE — 99395 PREV VISIT EST AGE 18-39: CPT | Performed by: FAMILY MEDICINE

## 2022-10-04 PROCEDURE — G8417 CALC BMI ABV UP PARAM F/U: HCPCS | Performed by: FAMILY MEDICINE

## 2022-10-04 PROCEDURE — 99213 OFFICE O/P EST LOW 20 MIN: CPT | Performed by: FAMILY MEDICINE

## 2022-10-04 PROCEDURE — G8427 DOCREV CUR MEDS BY ELIG CLIN: HCPCS | Performed by: FAMILY MEDICINE

## 2022-10-04 PROCEDURE — G8484 FLU IMMUNIZE NO ADMIN: HCPCS | Performed by: FAMILY MEDICINE

## 2022-10-04 PROCEDURE — 1036F TOBACCO NON-USER: CPT | Performed by: FAMILY MEDICINE

## 2022-10-04 PROCEDURE — 81002 URINALYSIS NONAUTO W/O SCOPE: CPT | Performed by: FAMILY MEDICINE

## 2022-10-04 SDOH — ECONOMIC STABILITY: FOOD INSECURITY: WITHIN THE PAST 12 MONTHS, THE FOOD YOU BOUGHT JUST DIDN'T LAST AND YOU DIDN'T HAVE MONEY TO GET MORE.: NEVER TRUE

## 2022-10-04 SDOH — ECONOMIC STABILITY: FOOD INSECURITY: WITHIN THE PAST 12 MONTHS, YOU WORRIED THAT YOUR FOOD WOULD RUN OUT BEFORE YOU GOT MONEY TO BUY MORE.: NEVER TRUE

## 2022-10-04 ASSESSMENT — ENCOUNTER SYMPTOMS
RESPIRATORY NEGATIVE: 1
GASTROINTESTINAL NEGATIVE: 1
EYES NEGATIVE: 1
ALLERGIC/IMMUNOLOGIC NEGATIVE: 1

## 2022-10-04 ASSESSMENT — PATIENT HEALTH QUESTIONNAIRE - PHQ9
8. MOVING OR SPEAKING SO SLOWLY THAT OTHER PEOPLE COULD HAVE NOTICED. OR THE OPPOSITE, BEING SO FIGETY OR RESTLESS THAT YOU HAVE BEEN MOVING AROUND A LOT MORE THAN USUAL: 0
1. LITTLE INTEREST OR PLEASURE IN DOING THINGS: 2
6. FEELING BAD ABOUT YOURSELF - OR THAT YOU ARE A FAILURE OR HAVE LET YOURSELF OR YOUR FAMILY DOWN: 1
SUM OF ALL RESPONSES TO PHQ9 QUESTIONS 1 & 2: 3
4. FEELING TIRED OR HAVING LITTLE ENERGY: 2
7. TROUBLE CONCENTRATING ON THINGS, SUCH AS READING THE NEWSPAPER OR WATCHING TELEVISION: 0
SUM OF ALL RESPONSES TO PHQ QUESTIONS 1-9: 8
3. TROUBLE FALLING OR STAYING ASLEEP: 0
9. THOUGHTS THAT YOU WOULD BE BETTER OFF DEAD, OR OF HURTING YOURSELF: 0
SUM OF ALL RESPONSES TO PHQ QUESTIONS 1-9: 8
SUM OF ALL RESPONSES TO PHQ QUESTIONS 1-9: 8
2. FEELING DOWN, DEPRESSED OR HOPELESS: 1
5. POOR APPETITE OR OVEREATING: 2
SUM OF ALL RESPONSES TO PHQ QUESTIONS 1-9: 8
10. IF YOU CHECKED OFF ANY PROBLEMS, HOW DIFFICULT HAVE THESE PROBLEMS MADE IT FOR YOU TO DO YOUR WORK, TAKE CARE OF THINGS AT HOME, OR GET ALONG WITH OTHER PEOPLE: 1

## 2022-10-04 ASSESSMENT — SOCIAL DETERMINANTS OF HEALTH (SDOH): HOW HARD IS IT FOR YOU TO PAY FOR THE VERY BASICS LIKE FOOD, HOUSING, MEDICAL CARE, AND HEATING?: NOT HARD AT ALL

## 2022-10-04 NOTE — PROGRESS NOTES
Well Adult Note  Name: Monisha Waite Date: 10/4/2022   MRN: [id number] Sex: Female   Age: 32 y.o. Ethnicity: Non- / Non    : 1995 Race: White (non-)      Carmine Mazariegos is here for well adult exam.  History:  Pt is here for annual physical exam   Over all doing okay   Had misscariage few months ago,   Her GYN did some testing show TSH slightly elevated free T4 was normal  A1c was done 5.5  She is been having issue with nipple discharge plaque-like for months according to her but then did not think about according to her with her GYN  Patient frustrated with her weight according to her she tried exercise weekly more than 5 times a week she is doing some strengthening exercise, trying to focus on healthier food denies any snacking  Review of Systems   Constitutional: Negative. HENT: Negative. Eyes: Negative. Respiratory: Negative. Cardiovascular: Negative. Gastrointestinal: Negative. Endocrine: Negative. Genitourinary: Negative. Musculoskeletal: Negative. Allergic/Immunologic: Negative. Neurological: Negative. Hematological: Negative. Psychiatric/Behavioral: Negative. All other systems reviewed and are negative. Allergies   Allergen Reactions    Nickel Rash     Skin began to excoriate    Lexapro [Escitalopram Oxalate]      Jittery and could not sleep         Prior to Visit Medications    Medication Sig Taking? Authorizing Provider   fluticasone (FLONASE) 50 MCG/ACT nasal spray 2 sprays by Nasal route in the morning and at bedtime Yes Kiersten Soni DO   SUMAtriptan (IMITREX) 100 MG tablet Take 100 mg by mouth once as needed for Migraine Yes Historical Provider, MD   methylphenidate (METADATE ER) 10 MG extended release tablet 15 mg. [facility] raised the dosage.  Yes Historical Provider, MD   methylphenidate (RITALIN) 5 MG tablet 10 mg. Jewel Terrell through [facility] raised the dosage.  Yes Historical Provider, MD   cyclobenzaprine (FLEXERIL) 5 MG tablet Take 1 tab nightly and as needed Yes Historical Provider, MD   cetirizine (ZYRTEC) 10 MG tablet Take 10 mg by mouth daily Yes Historical Provider, MD   albuterol sulfate HFA (VENTOLIN HFA) 108 (90 Base) MCG/ACT inhaler Inhale 2 puffs into the lungs every 6 hours as needed for Wheezing or Shortness of Breath (cough) Yes Ronen Lozano MD   EPINEPHrine (EPIPEN) 0.3 MG/0.3ML SOAJ injection Use as directed for allergic reaction Yes Luanne Sun DO   metFORMIN (GLUCOPHAGE) 500 MG tablet Take 500 mg by mouth daily (with breakfast) Take 500 mg in the morning and 1000 mg in the evenings Yes Historical Provider, MD   topiramate (TOPAMAX) 100 MG tablet TAKE 1 TABLET BY MOUTH TWICE A DAY  Patient not taking: Reported on 10/4/2022  Ronen Lozano MD   celecoxib (CELEBREX) 200 MG capsule TAKE 1 CAPSULE BY MOUTH EVERY DAY  Patient not taking: Reported on 10/4/2022  Ronen Lozano MD   amitriptyline (ELAVIL) 10 MG tablet TAKE 1 TABLET BY MOUTH EVERY DAY AT NIGHT  Patient not taking: Reported on 10/4/2022  Ronen Lozano MD   acyclovir (ZOVIRAX) 400 MG tablet Take 400 mg by mouth  Historical Provider, MD         Past Medical History:   Diagnosis Date    Anxiety     Atypical migraine     Bipolar 2 disorder ([facility] [address]     Borderline personality disorder ([facility] [address]     Depression     Gestational diabetes     H/O drug abuse ([facility] [address]     hx of heroin/cocaine    IBS (irritable bowel syndrome)     Obese     PCOS (polycystic ovarian syndrome)        Past Surgical History:   Procedure Laterality Date    COLONOSCOPY      ENDOSCOPY, COLON, DIAGNOSTIC      SEPTOPLASTY N/A 7/26/2021    SPHENOIDOTOMY RIGHT, BILATERAL ANTERIOR ETHMOIDECTOMY, BILATERAL INFERIOR TURBINATE REDUCTION, BILATERAL MAXILLARY ANTROSTOMY AND SEPTOPLASTY, NASAL SCOPE performed by Luanne Sun DO at [address] EXTRACTION           Family History   Problem Relation Age of Onset Other Maternal Grandmother         demetia    Heart Disease Maternal Grandmother     High Cholesterol Maternal Grandfather     Diabetes Maternal Grandfather     High Blood Pressure Maternal Grandfather     Heart Disease Maternal Grandfather        Social History     Tobacco Use    Smoking status: Never    Smokeless tobacco: Never   Vaping Use    Vaping Use: Never used   Substance Use Topics    Alcohol use: Not Currently    Drug use: Not Currently     Comment: heroin-snort 5 years ago       Objective   /80 (Site: Right Upper Arm, Position: Sitting, Cuff Size: Small Adult)   Pulse 76   Temp 97.3 °F (36.3 °C)   Ht 5' 0.75\" (1.543 m)   Wt 176 lb 3.2 oz (79.9 kg)   SpO2 97%   BMI 33.57 kg/m²   Wt Readings from Last 3 Encounters:   10/04/22 176 lb 3.2 oz (79.9 kg)   08/02/22 171 lb (77.6 kg)   06/29/22 171 lb 9.6 oz (77.8 kg)     There were no vitals filed for this visit. Physical Exam  Vitals reviewed. Constitutional:       General: She is not in acute distress. Appearance: She is well-developed. She is not diaphoretic. HENT:      Head: Normocephalic and atraumatic. Right Ear: External ear normal.      Left Ear: External ear normal.      Nose: Nose normal.      Mouth/Throat:      Pharynx: No oropharyngeal exudate. Eyes:      General: No scleral icterus. Right eye: No discharge. Left eye: No discharge. Conjunctiva/sclera: Conjunctivae normal.      Pupils: Pupils are equal, round, and reactive to light. Neck:      Thyroid: No thyromegaly. Vascular: No JVD. Trachea: No tracheal deviation. Cardiovascular:      Rate and Rhythm: Normal rate and regular rhythm. Heart sounds: Normal heart sounds. No murmur heard. No friction rub. No gallop. Pulmonary:      Effort: Pulmonary effort is normal. No respiratory distress. Breath sounds: Normal breath sounds. No stridor. No wheezing or rales. Chest:      Chest wall: No tenderness.    Abdominal: General: Bowel sounds are normal. There is no distension. Palpations: Abdomen is soft. There is no mass. Tenderness: There is no abdominal tenderness. There is no guarding or rebound. Musculoskeletal:         General: No tenderness. Normal range of motion. Cervical back: Normal range of motion and neck supple. Lymphadenopathy:      Cervical: No cervical adenopathy. Skin:     General: Skin is warm and dry. Coloration: Skin is not pale. Findings: No erythema or rash. Neurological:      Mental Status: She is alert and oriented to person, place, and time. Cranial Nerves: No cranial nerve deficit. Motor: No abnormal muscle tone. Coordination: Coordination normal.      Deep Tendon Reflexes: Reflexes are normal and symmetric. Reflexes normal.   Psychiatric:         Behavior: Behavior normal.         Thought Content: Thought content normal.         Judgment: Judgment normal.         Assessment   Plan    Diagnosis Orders   1. Encounter for well adult exam without abnormal findings        2. Galactorrhea        3.  Weight gain          Reviewed lab work from [facility] on [address] recheck TSH in 6 months  We will do blood work today  Discussed healthy lifestyle how to be energy negative caloric intake try to use one of the edwige to track her calorie along with physical activity         Personalized Preventive Plan   Current Health Maintenance Status  Immunization History   Administered Date(s) Administered    BCG (Tin BCG) 08/21/1998, 03/12/2012    COVID-19, PFIZER PURPLE top, DILUTE for use, (age 15 y+), 30mcg/0.3mL 09/15/2021, 10/06/2021    DTP 1995, 1995, 1995, 08/08/1994, 08/21/1998    HPV Quadrivalent (Gardasil) 05/18/2009, 07/23/2009, 11/25/2009    Hepatitis B 08/21/1998, 10/24/1998, 12/12/1998    Hib, unspecified 08/21/1998    Influenza Nasal 09/29/2010    Influenza Vaccine, unspecified formulation 01/11/2013, 10/22/2013, 10/21/2015, 10/01/2016, 09/29/2017    Influenza Virus Vaccine 09/02/2021    Influenza, FLUARIX, FLULAVAL, [place] (age 10 mo+) AND AFLURIA, (age 1 y+), PF, 0.5mL 10/21/2015, 11/09/2018    Influenza, FLUCELVAX, (age 10 mo+), MDCK, PF, 0.5mL 09/17/2020    Influenza, FLUMIST, (age 2-51 y), Live, Intranasal, 0.2mL 09/29/2010    MMR 05/20/1994, 02/16/2004    Meningococcal MPSV4 (Menomune) 06/20/2005, 08/17/2011    PPD Test 03/12/2012    Polio OPV 1993, 1993, 1993, 08/08/1994    Td, unspecified formulation 06/20/2005    Tdap (Boostrix, Adacel) 05/18/2009, 12/29/2017    Varicella (Varivax) 09/26/1998, 12/16/2013        Health Maintenance   Topic Date Due    Hepatitis B vaccine (4 of 4 - 4-dose series) 12/19/1998    COVID-19 Vaccine (3 - Booster for Pfizer series) 03/06/2022    Flu vaccine (1) 08/01/2022    Depression Monitoring  06/29/2023    Pap smear  09/21/2025    DTaP/Tdap/Td vaccine (8 - Td or Tdap) 12/29/2027    Varicella vaccine  Completed    Hepatitis C screen  Completed    HIV screen  Completed    Hepatitis A vaccine  Aged Out    Hib vaccine  Aged Out    Meningococcal (ACWY) vaccine  Aged Out    Pneumococcal 0-64 years Vaccine  Aged Out     Recommendations for Excep Apps Due: see orders and patient instructions/AVS.

## 2022-10-12 ENCOUNTER — HOSPITAL ENCOUNTER (OUTPATIENT)
Dept: PHYSICAL THERAPY | Age: 27
Setting detail: THERAPIES SERIES
Discharge: HOME | End: 2022-10-12
Payer: COMMERCIAL

## 2022-10-12 PROCEDURE — 97110 THERAPEUTIC EXERCISES: CPT

## 2022-10-12 PROCEDURE — 97140 MANUAL THERAPY 1/> REGIONS: CPT

## 2022-10-12 PROCEDURE — 20561 NDL INSJ W/O NJX 3+ MUSC: CPT

## 2022-10-12 NOTE — FLOWSHEET NOTE
[facility]      Physical Therapy Treatment Note/ Progress Report:     Date:  10/12/2022    Patient Name:  Karlee Boone    :  1995  MRN: [id number]  Restrictions/Precautions:    Medical/Treatment Diagnosis Information:  Diagnosis: M25.512 L shoulder pain  Treatment Diagnosis: M25.512 L shoulder pain  Insurance/Certification information:   [facility]  Physician Information:  Referring Practitioner: Dr. Chetna Webb  Has the plan of care been signed (Y/N):        [x]  Yes  []  No     Date of Patient follow up with Physician: not scheduled    Is this a Progress Report:     []  Yes  [x]  No     If Yes:  Date Range for reporting period:  Beginning:  ------------ Ending:     Progress report will be due (10 Rx or 30 days whichever is less):     Recertification will be due (POC Duration  / 90 days whichever is less): 22     Visit # Insurance Allowable Auth Required   In Person 4 16 visits (-10/1) [x]  Yes     []  No    Tele Health   []  Yes     []  No    Total 19 total       Functional Scale:   Date assessed:      Latex Allergy:  [x]NO      []YES  Preferred Language for Healthcare:   [x]English       []other:    Pain level:  4/10 shoulder blade    SUBJECTIVE:  Pt reports she has felt more tightness lately, in the last couple of days. Pt also notes she has been stressed due to receiving some news about her son. Pt had maybe a couple HA over the last 2 weeks. She has not been sleeping well. Pt's primary tightness is near superior angle of shoulder blade. Note: pt contacted insurance and DN is covered and no pre-auth is required - NOTE REFERRAL FOR DN (Dr. Avinash Almeida) NEXT VISIT    OBJECTIVE:   Observation:    Test measurements:    Dry needling manual therapy: consisted on the placement of 12 needles in the following muscles:  cervical multifidi C4, 5,6 LEFT and RIGHT, upper trap left, levator scapulae left, suboccipitals left and right.     A 60 mm needle was inserted, piston, rotated, and coned to produce intramuscular mobilization. These techniques were used to restore functional range of motion, reduce muscle spasm and induce healing in the corresponding musculature. (96095)  Clean Technique was utilized today while applying Dry needling treatment. The treatment sites where cleaned with 70% solution of  isopropyl alcohol . The PT washed their hands and utilized treatment gloves along with hand  prior to inserting the needles. All needles where removed and discarded in the appropriate sharps container. Pt tolerated treatment well. Noted radiating/referred symptoms to L sided PSIS, to ear and eye (in distribution where pt feels her HA), and jaw. Note that this subj report has been common throughout her treatment with manual therapy. Significant twitch noted in L UT this visit.      RESTRICTIONS/PRECAUTIONS: none    Exercises/Interventions:   Therapeutic Ex (40193) Sets/sec Reps Notes/CUES HEP   Row 3 15 Black TB, Tried ext (? pain) x   IR/ER  No money 3  3 10  10 BlueTB  GTB X (blue)   pec S at wall 20\" 3  x   x   horiz ABD 2-3 10 Supine this visit, GTB x   Chin tuck 5\" 2x10  x   x   Prone scap NV                             Note that pt uses lacrosse ball at home for self massage     Manual Intervention (21929)                 Dry needling   See above    x5     x8'  Note significant tightness lateral L neck    Hawks  to kj scap musculature x4'  prone    Thoracic manip x1  cavitation 10/12    NMR re-education (30838)   CUES NEEDED                                                                   Therapeutic Activity (87433)                                          [place] access code:    [email or web address]           Therapeutic Exercise and NMR EXR  [x] (90700) Provided verbal/tactile cueing for activities related to strengthening, flexibility, endurance, ROM  for improvements in scapular, scapulothoracic and UE control with self care, reaching, carrying, lifting, house/yardwork, driving/computer work.    [] (29511) Provided verbal/tactile cueing for activities related to improving balance, coordination, kinesthetic sense, posture, motor skill, proprioception  to assist with  scapular, scapulothoracic and UE control with self care, reaching, carrying, lifting, house/yardwork, driving/computer work. Therapeutic Activities:    [] (80813 or 06749) Provided verbal/tactile cueing for activities related to improving balance, coordination, kinesthetic sense, posture, motor skill, proprioception and motor activation to allow for proper function of scapular, scapulothoracic and UE control with self care, carrying, lifting, driving/computer work. Home Exercise Program:    [x] (65085) Reviewed/Progressed HEP activities related to strengthening, flexibility, endurance, ROM of scapular, scapulothoracic and UE control with self care, reaching, carrying, lifting, house/yardwork, driving/computer work  [] (66325) Reviewed/Progressed HEP activities related to improving balance, coordination, kinesthetic sense, posture, motor skill, proprioception of scapular, scapulothoracic and UE control with self care, reaching, carrying, lifting, house/yardwork, driving/computer work      Manual Treatments:  PROM / STM / Oscillations-Mobs:  G-I, II, III, IV (PA's, Inf., Post.)  [x] (30537) Provided manual therapy to mobilize soft tissue/joints of cervical/CT, scapular GHJ and UE for the purpose of modulating pain, promoting relaxation,  increasing ROM, reducing/eliminating soft tissue swelling/inflammation/restriction, improving soft tissue extensibility and allowing for proper ROM for normal function with self care, reaching, carrying, lifting, house/yardwork, driving/computer work    Modalities:  Electrical Stimulation for pain control, swelling reduction. Waveform: premod; Cycle Time: Continuous; Frequency: 100 pps; Polarity: Negative; Ramp: 2 sec;  Amplitude: 7.4 Volts Treatment time: 10 min. [] GAME READY (VASO)- for significant edema, swelling, pain control. Charges:  Timed Code Treatment Minutes: 50   Total Treatment Minutes:  50      [] EVAL (LOW) 45592 (typically 20 minutes face-to-face)  [] EVAL (MOD) 26383 (typically 30 minutes face-to-face)  [] EVAL (HIGH) 70277 (typically 45 minutes face-to-face)  [] RE-EVAL     [x] CHRISTIE(82395) x 2   [] IONTO  [] NMR (10017) x     [] VASO  [x] Manual (97217) x  1   [x] Other: dry needling  [] TA x      [] Mech Traction (22176)  [] ES(attended) (82607)      [] ES (un) (91001):    ASSESSMENT:  Pt exhibits continued ups and downs in pain and HA status. Pt's symptoms seem to change based on stress, amount of sleep and amount of lifting with LUE. Pt will continue to benefit from ? scap strength and stability and ? tightness throughout neck and shoulder musculature. Overall there has been significant improvement however PT continues to figure out which set of ex and treatment well produce long term benefit. GOALS:     Patient stated goal: relieve some of the pain, be able to continue my new workout     Therapist goals for Patient:   Short Term Goals: To be achieved in: 2 weeks  1. Independent in HEP and progression per patient tolerance, in order to prevent re-injury. [] Progressing: [x] Met: [] Not Met: [] Adjusted      2. Patient will have a decrease in pain to facilitate improvement in movement, function, and ADLs as indicated by Functional Deficits. [] Progressing: [x] Met: [] Not Met: [] Adjusted      Long Term Goals: To be achieved in: 12 weeks  1. Disability index score of 10% or less for the DASH to assist with reaching prior level of function. [x] Progressing: [] Met: [] Not Met: [] Adjusted      2. Patient will demonstrate increased AROM to WNL to allow for proper joint functioning as indicated by patients Functional Deficits. [x] Progressing: [] Met: [] Not Met: [] Adjusted      3.  Patient will demonstrate an increase in Strength to 5/5 to allow for proper functional mobility as indicated by patients Functional Deficits. [x] Progressing: [] Met: [] Not Met: [] Adjusted      4. Patient will return to all functional activities without increased symptoms or restriction. [x] Progressing: [] Met: [] Not Met: [] Adjusted      5. Pt will exhibit self(patient specific functional goal)    [x] Progressing: [] Met: [] Not Met: [] Adjusted        Access Code: [email or web address]  URL: [email or web address]  Date: 03/14/2022  Prepared by: Ashley Stage    Exercises  Standing Pec Stretch at [place] - 1 x daily - 7 x weekly - 1 sets - 3 reps - 20 sec hold  Standing Bilateral Low Shoulder Row with Anchored Resistance - 1 x daily - 7 x weekly - 3 sets - 10 reps  Standing Shoulder Internal Rotation with Anchored Resistance - 1 x daily - 7 x weekly - 3 sets - 10 reps  Shoulder External Rotation with Anchored Resistance - 1 x daily - 7 x weekly - 3 sets - 10 reps      Overall Progression Towards Functional goals/ Treatment Progress Update:  [x] Patient is progressing as expected towards functional goals listed. [] Progression is slowed due to complexities/Impairments listed. [] Progression has been slowed due to co-morbidities.   [] Plan just implemented, too soon to assess goals progression <30days   [] Goals require adjustment due to lack of progress  [] Patient is not progressing as expected and requires additional follow up with physician  [] Other    Prognosis for POC: [x] Good [] Fair  [] Poor      Patient requires continued skilled intervention: [x] Yes  [] No    Treatment/Activity Tolerance:  [x] Patient able to complete treatment  [] Patient limited by fatigue  [] Patient limited by pain    [] Patient limited by other medical complications  [] Other:       PLAN: See eval  [x] Continue per plan of care [] Alter current plan (see comments above)  [] Plan of care initiated [] Hold pending MD visit [] Discharge    Electronically signed by:  Casey Arias PT    Note: If patient does not return for scheduled/ recommended follow up visits, this note will serve as a discharge from care along with most recent update on progress.

## 2022-10-26 ENCOUNTER — HOSPITAL ENCOUNTER (OUTPATIENT)
Dept: PHYSICAL THERAPY | Age: 27
Setting detail: THERAPIES SERIES
Discharge: HOME | End: 2022-10-26
Payer: COMMERCIAL

## 2022-10-26 PROCEDURE — 20561 NDL INSJ W/O NJX 3+ MUSC: CPT

## 2022-10-26 PROCEDURE — 97140 MANUAL THERAPY 1/> REGIONS: CPT

## 2022-10-26 PROCEDURE — 97110 THERAPEUTIC EXERCISES: CPT

## 2022-10-26 NOTE — FLOWSHEET NOTE
[facility]      Physical Therapy Treatment Note/ Progress Report:     Date:  10/26/2022    Patient Name:  Bonne Rubinstein    :  1995  MRN: [id number]  Restrictions/Precautions:    Medical/Treatment Diagnosis Information:  Diagnosis: M25.512 L shoulder pain  Treatment Diagnosis: M25.512 L shoulder pain  Insurance/Certification information:   [facility]  Physician Information:  Referring Practitioner: Dr. Nevin Ramirez  Has the plan of care been signed (Y/N):        [x]  Yes  []  No     Date of Patient follow up with Physician: not scheduled    Is this a Progress Report:     []  Yes  [x]  No     If Yes:  Date Range for reporting period:  Beginning:  ------------ Ending:     Progress report will be due (10 Rx or 30 days whichever is less):     Recertification will be due (POC Duration  / 90 days whichever is less): 22     Visit # Insurance Allowable Auth Required   In Person 7 16 visits (-23) [x]  Yes     []  No    Tele Health   []  Yes     []  No    Total 19 total       Functional Scale:   Date assessed:      Latex Allergy:  [x]NO      []YES  Preferred Language for Healthcare:   [x]English       []other:    Pain level:  2/10 shoulder blade (pressure, irritating)    SUBJECTIVE:  Pt reports she has felt better over the last 2 weeks with some of the stress coming down after finding out that her son's tumor was benign. Pt states she did have a migraine once however her shoulder overall has felt ok. Note: pt contacted insurance and DN is covered and no pre-auth is required - NOTE REFERRAL FOR DN (Dr. Yudy Louis) NEXT VISIT    OBJECTIVE:   Observation:    Test measurements:    Dry needling manual therapy: consisted on the placement of 12 needles in the following muscles:  cervical multifidi C4, 5,6 LEFT and RIGHT, upper trap left, levator scapulae left, suboccipitals left and right.     A 60 mm needle was inserted, piston, rotated, and coned to produce intramuscular mobilization. These techniques were used to restore functional range of motion, reduce muscle spasm and induce healing in the corresponding musculature. (36578)  Clean Technique was utilized today while applying Dry needling treatment. The treatment sites where cleaned with 70% solution of  isopropyl alcohol . The PT washed their hands and utilized treatment gloves along with hand  prior to inserting the needles. All needles where removed and discarded in the appropriate sharps container. Pt tolerated treatment well. Noted radiating/referred symptoms to L sided PSIS, to ear and eye (in distribution where pt feels her HA), and jaw. Note that this subj report has been common throughout her treatment with manual therapy. Significant twitch noted in L UT this visit.      RESTRICTIONS/PRECAUTIONS: none    Exercises/Interventions:   Therapeutic Ex (29187) Sets/sec Reps Notes/CUES HEP   Row 3 15 Black TB, Tried ext (? pain) x   IR/ER  No money 3  3 10  10 BlueTB  GTB X (blue)   pec S at wall 20\" 3  x   x   horiz ABD 2-3 10 Supine this visit, GTB x   Chin tuck 5\" 2x10  x   x   Prone scap NV                             Note that pt uses lacrosse ball at home for self massage     Manual Intervention (38759)            SOR, cervical up glides bilat x5'      Dry needling   See above    x5     x8'  Note significant tightness lateral L neck    x4'  prone    Thoracic manip x1  cavitation 10/12    NMR re-education (07297)   CUES NEEDED                                                                   Therapeutic Activity (65493)                                          [employer] access code:    [email or web address]           Therapeutic Exercise and NMR EXR  [x] (01059) Provided verbal/tactile cueing for activities related to strengthening, flexibility, endurance, ROM  for improvements in scapular, scapulothoracic and UE control with self care, reaching, carrying, lifting, house/yardwork, driving/computer work.    [] (91190) Provided verbal/tactile cueing for activities related to improving balance, coordination, kinesthetic sense, posture, motor skill, proprioception  to assist with  scapular, scapulothoracic and UE control with self care, reaching, carrying, lifting, house/yardwork, driving/computer work. Therapeutic Activities:    [] (57863 or 85161) Provided verbal/tactile cueing for activities related to improving balance, coordination, kinesthetic sense, posture, motor skill, proprioception and motor activation to allow for proper function of scapular, scapulothoracic and UE control with self care, carrying, lifting, driving/computer work. Home Exercise Program:    [x] (59109) Reviewed/Progressed HEP activities related to strengthening, flexibility, endurance, ROM of scapular, scapulothoracic and UE control with self care, reaching, carrying, lifting, house/yardwork, driving/computer work  [] (16168) Reviewed/Progressed HEP activities related to improving balance, coordination, kinesthetic sense, posture, motor skill, proprioception of scapular, scapulothoracic and UE control with self care, reaching, carrying, lifting, house/yardwork, driving/computer work      Manual Treatments:  PROM / STM / Oscillations-Mobs:  G-I, II, III, IV (PA's, Inf., Post.)  [x] (67826) Provided manual therapy to mobilize soft tissue/joints of cervical/CT, scapular GHJ and UE for the purpose of modulating pain, promoting relaxation,  increasing ROM, reducing/eliminating soft tissue swelling/inflammation/restriction, improving soft tissue extensibility and allowing for proper ROM for normal function with self care, reaching, carrying, lifting, house/yardwork, driving/computer work    Modalities:     [] GAME READY (VASO)- for significant edema, swelling, pain control.      Charges:  Timed Code Treatment Minutes: 50   Total Treatment Minutes:  50      [] EVAL (LOW) 64366 (typically 20 minutes face-to-face)  [] EVAL (MOD) 78444 (typically 30 minutes face-to-face)  [] EVAL (HIGH) 99719 (typically 45 minutes face-to-face)  [] RE-EVAL     [x] GR(70903) x 2   [] IONTO  [] NMR (30940) x     [] VASO  [x] Manual (75740) x  1   [x] Other: dry needling  [] TA x      [] Mech Traction (30528)  [] ES(attended) (94088)      [] ES (un) (90995):    ASSESSMENT:  Pt exhibits continued ups and downs in pain and HA status. Pt's symptoms seem to change based on stress, amount of sleep and amount of lifting with LUE. Pt will continue to benefit from ? scap strength and stability and ? tightness throughout neck and shoulder musculature. Overall there has been significant improvement however PT continues to figure out which set of ex and treatment well produce long term benefit. GOALS:     Patient stated goal: relieve some of the pain, be able to continue my new workout     Therapist goals for Patient:   Short Term Goals: To be achieved in: 2 weeks  1. Independent in HEP and progression per patient tolerance, in order to prevent re-injury. [] Progressing: [x] Met: [] Not Met: [] Adjusted      2. Patient will have a decrease in pain to facilitate improvement in movement, function, and ADLs as indicated by Functional Deficits. [] Progressing: [x] Met: [] Not Met: [] Adjusted      Long Term Goals: To be achieved in: 12 weeks  1. Disability index score of 10% or less for the DASH to assist with reaching prior level of function. [x] Progressing: [] Met: [] Not Met: [] Adjusted      2. Patient will demonstrate increased AROM to WNL to allow for proper joint functioning as indicated by patients Functional Deficits. [x] Progressing: [] Met: [] Not Met: [] Adjusted      3. Patient will demonstrate an increase in Strength to 5/5 to allow for proper functional mobility as indicated by patients Functional Deficits. [x] Progressing: [] Met: [] Not Met: [] Adjusted      4.  Patient will return to all functional activities without increased symptoms or restriction. [x] Progressing: [] Met: [] Not Met: [] Adjusted      5. Pt will exhibit self(patient specific functional goal)    [x] Progressing: [] Met: [] Not Met: [] Adjusted        Access Code: [email or web address]  URL: [email or web address]  Date: 03/14/2022  Prepared by: Arleen Muskrat    Exercises  Standing Pec Stretch at [place] - 1 x daily - 7 x weekly - 1 sets - 3 reps - 20 sec hold  Standing Bilateral Low Shoulder Row with Anchored Resistance - 1 x daily - 7 x weekly - 3 sets - 10 reps  Standing Shoulder Internal Rotation with Anchored Resistance - 1 x daily - 7 x weekly - 3 sets - 10 reps  Shoulder External Rotation with Anchored Resistance - 1 x daily - 7 x weekly - 3 sets - 10 reps      Overall Progression Towards Functional goals/ Treatment Progress Update:  [x] Patient is progressing as expected towards functional goals listed. [] Progression is slowed due to complexities/Impairments listed. [] Progression has been slowed due to co-morbidities. [] Plan just implemented, too soon to assess goals progression <30days   [] Goals require adjustment due to lack of progress  [] Patient is not progressing as expected and requires additional follow up with physician  [] Other    Prognosis for POC: [x] Good [] Fair  [] Poor      Patient requires continued skilled intervention: [x] Yes  [] No    Treatment/Activity Tolerance:  [x] Patient able to complete treatment  [] Patient limited by fatigue  [] Patient limited by pain    [] Patient limited by other medical complications  [] Other:       PLAN: See eval  [x] Continue per plan of care [] Alter current plan (see comments above)  [] Plan of care initiated [] Hold pending MD visit [] Discharge    Electronically signed by:  Ramonita Head PT    Note: If patient does not return for scheduled/ recommended follow up visits, this note will serve as a discharge from care along with most recent update on progress.

## 2022-10-31 ENCOUNTER — PATIENT MESSAGE (OUTPATIENT)
Dept: PRIMARY CARE CLINIC | Age: 27
End: 2022-10-31

## 2022-10-31 NOTE — TELEPHONE ENCOUNTER
From: Bj Lee  To: Dr. Charissa Huston: 10/31/2022 1:31 AM EDT  Subject: Hormone tests    Hi Dr. Zaki Brandono you are well. Last visit we discussed hormones and did the prolactin test and I saw that they did the thyroid test even when I told them not to. Since then I did a little more research and talked to my therapist last week and based off my symptoms I was wondering if it would be possible to examine my Cortisol, testosterone, and estrogen levels? In addition, something I have talked to my physical therapist too, was maybe looking into some kind of connective tissue disease. I know thats pretty broad but when I looked into the symptoms of this and the health issues that have been flaring, I wonder if this is something you might be able to look into? What I read for diagnosing was vague so Im not sure how to go about it. But my skin is pretty elasticity, the levoscoliosis, my tilted hips, the lump on my neck between my shoulders, the over extension of my knees, flexibility of my hands, and my eye doctor expressed concern for how dry my eyes are. Please let me know if I should schedule something or come in for some blood work if you think any of its worth exploring further, but I really think we might be on to something after reading about hormones (cortisol especially) and connective tissue diseases. Thank you and have a great day!   Lore Camara

## 2022-11-09 ENCOUNTER — HOSPITAL ENCOUNTER (OUTPATIENT)
Dept: PHYSICAL THERAPY | Age: 27
Setting detail: THERAPIES SERIES
Discharge: HOME | End: 2022-11-09
Payer: COMMERCIAL

## 2022-11-09 PROCEDURE — 97140 MANUAL THERAPY 1/> REGIONS: CPT

## 2022-11-09 PROCEDURE — 20561 NDL INSJ W/O NJX 3+ MUSC: CPT

## 2022-11-09 PROCEDURE — 97110 THERAPEUTIC EXERCISES: CPT

## 2022-11-09 NOTE — FLOWSHEET NOTE
[facility]      Physical Therapy Treatment Note/ Progress Report:     Date:  2022    Patient Name:  Venita Espinoza    :  1995  MRN: [id number]  Restrictions/Precautions:    Medical/Treatment Diagnosis Information:  Diagnosis: M25.512 L shoulder pain  Treatment Diagnosis: M25.512 L shoulder pain  Insurance/Certification information:   [facility]  Physician Information:  Referring Practitioner: Dr. Lawrence Collier  Has the plan of care been signed (Y/N):        [x]  Yes  []  No     Date of Patient follow up with Physician: not scheduled    Is this a Progress Report:     []  Yes  [x]  No     If Yes:  Date Range for reporting period:  Beginning:  ------------ Ending:     Progress report will be due (10 Rx or 30 days whichever is less): 70    Recertification will be due (POC Duration  / 90 days whichever is less): 22     Visit # Insurance Allowable Auth Required   In Person 8 16 visits (-23) [x]  Yes     []  No    Tele Health   []  Yes     []  No    Total 19 total       Functional Scale:   Date assessed:      Latex Allergy:  [x]NO      []YES  Preferred Language for Healthcare:   [x]English       []other:    Pain level:  2/10 shoulder blade (pressure, irritating)    SUBJECTIVE:  Pt reports she has felt better over the last 2 weeks with some of the stress coming down after finding out that her son's tumor was benign. Pt states she did have a migraine once however her shoulder overall has felt ok. Note: pt contacted insurance and DN is covered and no pre-auth is required - NOTE REFERRAL FOR DN (Dr. Denver Nye) NEXT VISIT    OBJECTIVE:   Observation:    Test measurements:    Dry needling manual therapy: consisted on the placement of 12 needles in the following muscles:  cervical multifidi C4, 5,6 LEFT and RIGHT, upper trap left, levator scapulae left, suboccipitals left and right.     A 60 mm needle was inserted, piston, rotated, and coned to produce intramuscular mobilization. These techniques were used to restore functional range of motion, reduce muscle spasm and induce healing in the corresponding musculature. (34550)  Clean Technique was utilized today while applying Dry needling treatment. The treatment sites where cleaned with 70% solution of  isopropyl alcohol . The PT washed their hands and utilized treatment gloves along with hand  prior to inserting the needles. All needles where removed and discarded in the appropriate sharps container. Pt tolerated treatment well. Noted radiating/referred symptoms to L sided PSIS, to ear and eye (in distribution where pt feels her HA), and jaw. Note that this subj report has been common throughout her treatment with manual therapy. Significant twitch noted in L UT this visit.      RESTRICTIONS/PRECAUTIONS: none    Exercises/Interventions:   Therapeutic Ex (46824) Sets/sec Reps Notes/CUES HEP   Row 3 15 Black TB x   IR/ER    No money 3    3 10    10 BlackB, less pain with scap engagement  BlueTB X (blue)   pec S at wall 20\" 3  x   x   horiz ABD 2-3 10 Supine this visit, GTB x   Chin tuck 5\" 2x10  x   x   Prone I, T 1 10 Added 11/9 x                          Note that pt uses lacrosse ball at home for self massage     Manual Intervention (29142)            SOR, cervical up glides bilat x5'      Dry needling   See above    x5     x8'  Note significant tightness lateral L neck    x4'  prone    Thoracic manip x1  cavitation 10/12    NMR re-education (42348)   CUES NEEDED                                                                   Therapeutic Activity (26648)                                          [employer] access code:    [email or web address]           Therapeutic Exercise and NMR EXR  [x] (36518) Provided verbal/tactile cueing for activities related to strengthening, flexibility, endurance, ROM  for improvements in scapular, scapulothoracic and UE control with self care, reaching, carrying, lifting, house/yardwork, driving/computer work.    [] (11864) Provided verbal/tactile cueing for activities related to improving balance, coordination, kinesthetic sense, posture, motor skill, proprioception  to assist with  scapular, scapulothoracic and UE control with self care, reaching, carrying, lifting, house/yardwork, driving/computer work. Therapeutic Activities:    [] (52430 or 49383) Provided verbal/tactile cueing for activities related to improving balance, coordination, kinesthetic sense, posture, motor skill, proprioception and motor activation to allow for proper function of scapular, scapulothoracic and UE control with self care, carrying, lifting, driving/computer work. Home Exercise Program:    [x] (91314) Reviewed/Progressed HEP activities related to strengthening, flexibility, endurance, ROM of scapular, scapulothoracic and UE control with self care, reaching, carrying, lifting, house/yardwork, driving/computer work  [] (95441) Reviewed/Progressed HEP activities related to improving balance, coordination, kinesthetic sense, posture, motor skill, proprioception of scapular, scapulothoracic and UE control with self care, reaching, carrying, lifting, house/yardwork, driving/computer work      Manual Treatments:  PROM / STM / Oscillations-Mobs:  G-I, II, III, IV (PA's, Inf., Post.)  [x] (33614) Provided manual therapy to mobilize soft tissue/joints of cervical/CT, scapular GHJ and UE for the purpose of modulating pain, promoting relaxation,  increasing ROM, reducing/eliminating soft tissue swelling/inflammation/restriction, improving soft tissue extensibility and allowing for proper ROM for normal function with self care, reaching, carrying, lifting, house/yardwork, driving/computer work    Modalities:     [] GAME READY (VASO)- for significant edema, swelling, pain control.      Charges:  Timed Code Treatment Minutes: 50   Total Treatment Minutes:  50      [] EVAL (LOW) 95113 (typically 20 minutes face-to-face)  [] EVAL (MOD) 08527 (typically 30 minutes face-to-face)  [] EVAL (HIGH) 74906 (typically 45 minutes face-to-face)  [] RE-EVAL     [x] MW(27522) x 2   [] IONTO  [] NMR (41096) x     [] VASO  [x] Manual (39584) x  1   [x] Other: dry needling  [] TA x      [] Mech Traction (31377)  [] ES(attended) (39724)      [] ES (un) (84608):    ASSESSMENT:  Pt exhibits continued ups and downs in pain and HA status. Pt's symptoms seem to change based on stress, amount of sleep and amount of lifting with LUE. Pt will continue to benefit from ? scap strength and stability and ? tightness throughout neck and shoulder musculature. Overall there has been significant improvement however PT continues to figure out which set of ex and treatment well produce long term benefit. GOALS:     Patient stated goal: relieve some of the pain, be able to continue my new workout     Therapist goals for Patient:   Short Term Goals: To be achieved in: 2 weeks  1. Independent in HEP and progression per patient tolerance, in order to prevent re-injury. [] Progressing: [x] Met: [] Not Met: [] Adjusted      2. Patient will have a decrease in pain to facilitate improvement in movement, function, and ADLs as indicated by Functional Deficits. [] Progressing: [x] Met: [] Not Met: [] Adjusted      Long Term Goals: To be achieved in: 12 weeks  1. Disability index score of 10% or less for the DASH to assist with reaching prior level of function. [x] Progressing: [] Met: [] Not Met: [] Adjusted      2. Patient will demonstrate increased AROM to WNL to allow for proper joint functioning as indicated by patients Functional Deficits. [x] Progressing: [] Met: [] Not Met: [] Adjusted      3. Patient will demonstrate an increase in Strength to 5/5 to allow for proper functional mobility as indicated by patients Functional Deficits. [x] Progressing: [] Met: [] Not Met: [] Adjusted      4.  Patient will return to all functional activities without increased symptoms or restriction. [x] Progressing: [] Met: [] Not Met: [] Adjusted      5. Pt will exhibit self(patient specific functional goal)    [x] Progressing: [] Met: [] Not Met: [] Adjusted        Access Code: [email or web address]  URL: [email or web address]  Date: 03/14/2022  Prepared by: Ashley Stage    Exercises  Standing Pec Stretch at [place] [place] - 1 x daily - 7 x weekly - 1 sets - 3 reps - 20 sec hold  Standing Bilateral Low Shoulder Row with Anchored Resistance - 1 x daily - 7 x weekly - 3 sets - 10 reps  Standing Shoulder Internal Rotation with Anchored Resistance - 1 x daily - 7 x weekly - 3 sets - 10 reps  Shoulder External Rotation with Anchored Resistance - 1 x daily - 7 x weekly - 3 sets - 10 reps      Overall Progression Towards Functional goals/ Treatment Progress Update:  [x] Patient is progressing as expected towards functional goals listed. [] Progression is slowed due to complexities/Impairments listed. [] Progression has been slowed due to co-morbidities. [] Plan just implemented, too soon to assess goals progression <30days   [] Goals require adjustment due to lack of progress  [] Patient is not progressing as expected and requires additional follow up with physician  [] Other    Prognosis for POC: [x] Good [] Fair  [] Poor      Patient requires continued skilled intervention: [x] Yes  [] No    Treatment/Activity Tolerance:  [x] Patient able to complete treatment  [] Patient limited by fatigue  [] Patient limited by pain    [] Patient limited by other medical complications  [] Other:       PLAN: See eval  [x] Continue per plan of care [] Alter current plan (see comments above)  [] Plan of care initiated [] Hold pending MD visit [] Discharge    Electronically signed by:  Candelario Go PT    Note: If patient does not return for scheduled/ recommended follow up visits, this note will serve as a discharge from care along with most recent update on progress.

## 2022-12-06 ENCOUNTER — OFFICE VISIT (OUTPATIENT)
Dept: PRIMARY CARE CLINIC | Age: 27
End: 2022-12-06
Payer: COMMERCIAL

## 2022-12-06 VITALS
RESPIRATION RATE: 20 BRPM | BODY MASS INDEX: 31.79 KG/M2 | OXYGEN SATURATION: 100 % | HEART RATE: 86 BPM | HEIGHT: 63 IN | DIASTOLIC BLOOD PRESSURE: 110 MMHG | WEIGHT: 179.4 LBS | SYSTOLIC BLOOD PRESSURE: 122 MMHG | TEMPERATURE: 97.1 F

## 2022-12-06 DIAGNOSIS — F90.9 ATTENTION DEFICIT HYPERACTIVITY DISORDER (ADHD), UNSPECIFIED ADHD TYPE: ICD-10-CM

## 2022-12-06 DIAGNOSIS — M79.10 MYALGIA: ICD-10-CM

## 2022-12-06 DIAGNOSIS — R03.0 PREHYPERTENSION: ICD-10-CM

## 2022-12-06 DIAGNOSIS — F41.8 ANXIETY WITH DEPRESSION: Primary | ICD-10-CM

## 2022-12-06 LAB
ALBUMIN SERPL-MCNC: 4.8 G/DL (ref 3.4–5)
ALP BLD-CCNC: 89 U/L (ref 40–129)
ALT SERPL-CCNC: 13 U/L (ref 10–40)
ANION GAP SERPL CALCULATED.3IONS-SCNC: 13 MMOL/L (ref 3–16)
AST SERPL-CCNC: 12 U/L (ref 15–37)
BILIRUB SERPL-MCNC: 0.3 MG/DL (ref 0–1)
BILIRUBIN DIRECT: <0.2 MG/DL (ref 0–0.3)
BILIRUBIN, INDIRECT: ABNORMAL MG/DL (ref 0–1)
BUN BLDV-MCNC: 7 MG/DL (ref 7–20)
C-REACTIVE PROTEIN: <3 MG/L (ref 0–5.1)
CALCIUM SERPL-MCNC: 9.4 MG/DL (ref 8.3–10.6)
CHLORIDE BLD-SCNC: 103 MMOL/L (ref 99–110)
CHOLESTEROL, TOTAL: 167 MG/DL (ref 0–199)
CO2: 23 MMOL/L (ref 21–32)
CREAT SERPL-MCNC: <0.5 MG/DL (ref 0.6–1.1)
FOLATE: 7.38 NG/ML (ref 4.78–24.2)
GFR SERPL CREATININE-BSD FRML MDRD: >60 ML/MIN/{1.73_M2}
GLUCOSE BLD-MCNC: 84 MG/DL (ref 70–99)
HCT VFR BLD CALC: 38.5 % (ref 36–48)
HDLC SERPL-MCNC: 58 MG/DL (ref 40–60)
HEMOGLOBIN: 12.6 G/DL (ref 12–16)
LDL CHOLESTEROL CALCULATED: 100 MG/DL
MCH RBC QN AUTO: 29.7 PG (ref 26–34)
MCHC RBC AUTO-ENTMCNC: 32.7 G/DL (ref 31–36)
MCV RBC AUTO: 90.8 FL (ref 80–100)
PDW BLD-RTO: 13.4 % (ref 12.4–15.4)
PLATELET # BLD: 313 K/UL (ref 135–450)
PMV BLD AUTO: 9.3 FL (ref 5–10.5)
POTASSIUM SERPL-SCNC: 4 MMOL/L (ref 3.5–5.1)
RBC # BLD: 4.24 M/UL (ref 4–5.2)
RHEUMATOID FACTOR: <10 IU/ML
SEDIMENTATION RATE, ERYTHROCYTE: 37 MM/HR (ref 0–20)
SODIUM BLD-SCNC: 139 MMOL/L (ref 136–145)
TOTAL PROTEIN: 8.3 G/DL (ref 6.4–8.2)
TRIGL SERPL-MCNC: 47 MG/DL (ref 0–150)
TSH SERPL DL<=0.05 MIU/L-ACNC: 2.04 UIU/ML (ref 0.27–4.2)
VITAMIN B-12: 295 PG/ML (ref 211–911)
VLDLC SERPL CALC-MCNC: 9 MG/DL
WBC # BLD: 7.2 K/UL (ref 4–11)

## 2022-12-06 PROCEDURE — 1036F TOBACCO NON-USER: CPT | Performed by: FAMILY MEDICINE

## 2022-12-06 PROCEDURE — G8427 DOCREV CUR MEDS BY ELIG CLIN: HCPCS | Performed by: FAMILY MEDICINE

## 2022-12-06 PROCEDURE — G8484 FLU IMMUNIZE NO ADMIN: HCPCS | Performed by: FAMILY MEDICINE

## 2022-12-06 PROCEDURE — G8417 CALC BMI ABV UP PARAM F/U: HCPCS | Performed by: FAMILY MEDICINE

## 2022-12-06 PROCEDURE — 99214 OFFICE O/P EST MOD 30 MIN: CPT | Performed by: FAMILY MEDICINE

## 2022-12-06 ASSESSMENT — ENCOUNTER SYMPTOMS
GASTROINTESTINAL NEGATIVE: 1
RESPIRATORY NEGATIVE: 1
EYES NEGATIVE: 1

## 2022-12-06 NOTE — PROGRESS NOTES
SUBJECTIVE:  Patient ID: Heather Vick is a 32 y.o. y.o. female     HPI   Patient presented with the multiple concerns about multiple symptoms she has been experiencing on and off getting worse lately a lot of body ache joint tenderness muscle ache she has been diagnosed with ADHD and medication sees a psychiatrist history of anxiety depression currently on no medication  Patient with migraine seems getting a little worse she is going through physical therapy which does help her headaches the pain mainly in the upper shoulders and the neck area abnormal blood pressure reading today is nervous and anxious she feels she is getting rashes here and there she is worried about autoimmune disease  She has been seeing her gynecologist on metformin for PCOS  Past Medical History:   Diagnosis Date    ADHD (attention deficit hyperactivity disorder)     Anxiety     Atypical migraine     Bipolar 2 disorder ([facility] [address]     Depression     Gestational diabetes     H/O drug abuse ([facility] [address]     hx of heroin/cocaine    IBS (irritable bowel syndrome)     Obese     PCOS (polycystic ovarian syndrome)       Past Surgical History:   Procedure Laterality Date    COLONOSCOPY      ENDOSCOPY, COLON, DIAGNOSTIC      SEPTOPLASTY N/A 7/26/2021    SPHENOIDOTOMY RIGHT, BILATERAL ANTERIOR ETHMOIDECTOMY, BILATERAL INFERIOR TURBINATE REDUCTION, BILATERAL MAXILLARY ANTROSTOMY AND SEPTOPLASTY, NASAL SCOPE performed by Beto Song DO at [facility]       Family History   Problem Relation Age of Onset    Other Maternal Grandmother         demetia    Heart Disease Maternal Grandmother     High Cholesterol Maternal Grandfather     Diabetes Maternal Grandfather     High Blood Pressure Maternal Grandfather     Heart Disease Maternal Grandfather      Social History     Socioeconomic History    Marital status: Life Partner     Spouse name: None    Number of children: None    Years of education: None    Highest education level: None Occupational History     Comment: Refused to answer   Tobacco Use    Smoking status: Never    Smokeless tobacco: Never   Vaping Use    Vaping Use: Never used   Substance and Sexual Activity    Alcohol use: Not Currently    Drug use: Not Currently     Comment: heroin-snort 5 years ago    Sexual activity: Yes     Partners: Male     Social Determinants of Health     Financial Resource Strain: Low Risk     Difficulty of Paying Living Expenses: Not hard at all   Food Insecurity: No Food Insecurity    Worried About [facility] in the Last Year: Never true    [facility] in the Last Year: Never true     Current Outpatient Medications   Medication Sig Dispense Refill    fluticasone (FLONASE) 50 MCG/ACT nasal spray 2 sprays by Nasal route in the morning and at bedtime 2 each 11    SUMAtriptan (IMITREX) 100 MG tablet Take 100 mg by mouth once as needed for Migraine      methylphenidate (METADATE ER) 10 MG extended release tablet 15 mg. Veria August [facility] raised the dosage. methylphenidate (RITALIN) 5 MG tablet 10 mg. Ernie August through [facility] raised the dosage. cyclobenzaprine (FLEXERIL) 5 MG tablet Take 1 tab nightly and as needed      cetirizine (ZYRTEC) 10 MG tablet Take 10 mg by mouth daily      albuterol sulfate HFA (VENTOLIN HFA) 108 (90 Base) MCG/ACT inhaler Inhale 2 puffs into the lungs every 6 hours as needed for Wheezing or Shortness of Breath (cough) 54 g 1    EPINEPHrine (EPIPEN) 0.3 MG/0.3ML SOAJ injection Use as directed for allergic reaction 1 each 1    metFORMIN (GLUCOPHAGE) 500 MG tablet Take 500 mg by mouth daily (with breakfast) Take 500 mg in the morning and 1000 mg in the evenings      acyclovir (ZOVIRAX) 400 MG tablet Take 400 mg by mouth       No current facility-administered medications for this visit.      Allergies   Allergen Reactions    Nickel Rash     Skin began to excoriate    Lexapro [Escitalopram Oxalate]      Jittery and could not sleep       Review of Systems   Constitutional: Negative. HENT: Negative. Eyes: Negative. Respiratory: Negative. Cardiovascular: Negative. Gastrointestinal: Negative. Musculoskeletal:  Positive for arthralgias, myalgias, neck pain and neck stiffness. Skin:  Positive for rash. All other systems reviewed and are negative. OBJECTIVE:  BP (!) 122/110 (Site: Right Upper Arm, Position: Sitting)   Pulse 86   Temp 97.1 °F (36.2 °C)   Resp 20   Ht 5' 3\" (1.6 m)   Wt 179 lb 6.4 oz (81.4 kg)   SpO2 100%   BMI 31.78 kg/m²     Physical Exam  Vitals reviewed. Constitutional:       Appearance: Normal appearance. HENT:      Head: Normocephalic and atraumatic. Eyes:      General: No scleral icterus. Conjunctiva/sclera: Conjunctivae normal.   Neck:      Thyroid: No thyromegaly. Vascular: No JVD. Cardiovascular:      Rate and Rhythm: Normal rate and regular rhythm. Heart sounds: Normal heart sounds. No murmur heard. No friction rub. No gallop. Pulmonary:      Effort: Pulmonary effort is normal.      Breath sounds: Normal breath sounds. No wheezing or rales. Abdominal:      General: Bowel sounds are normal. There is no distension. Palpations: Abdomen is soft. There is no mass. Tenderness: There is no abdominal tenderness. Hernia: No hernia is present. Musculoskeletal:      Comments: Many tender points on exam shoulders upper back upper chest elbow wrist and knees   Lymphadenopathy:      Cervical: No cervical adenopathy. Skin:     Findings: No rash. Neurological:      Mental Status: She is alert and oriented to person, place, and time. ASSESSMENT:     Diagnosis Orders   1. Anxiety with depression        2. Myalgia  Basic Metabolic Panel    CBC    Lipid Panel    Hemoglobin A1C    TSH    Hepatic Function Panel    Vitamin B12 & Folate    Sedimentation Rate    C-Reactive Protein    Rheumatoid Factor      3. Prehypertension        4.  Attention deficit hyperactivity disorder (ADHD), unspecified ADHD type              PLAN:  See orders   Abnormal blood pressure reading today discussed with the patient important to discuss with her psychiatrist in GYN since she started on birth control recently  Monitor blood pressure at home  Nurse visit in 2 weeks to recheck blood pressure  Discussed possible fibromyalgia we will start with work-up to rule out possible autoimmune disease  Encouraged patient to healthy lifestyle

## 2022-12-07 ENCOUNTER — HOSPITAL ENCOUNTER (OUTPATIENT)
Dept: PHYSICAL THERAPY | Age: 27
Setting detail: THERAPIES SERIES
Discharge: HOME | End: 2022-12-07
Payer: COMMERCIAL

## 2022-12-07 LAB
ESTIMATED AVERAGE GLUCOSE: 99.7 MG/DL
HBA1C MFR BLD: 5.1 %

## 2022-12-07 PROCEDURE — 97110 THERAPEUTIC EXERCISES: CPT

## 2022-12-07 PROCEDURE — 20561 NDL INSJ W/O NJX 3+ MUSC: CPT

## 2022-12-07 PROCEDURE — 97140 MANUAL THERAPY 1/> REGIONS: CPT

## 2022-12-07 NOTE — FLOWSHEET NOTE
[facility]      Physical Therapy Treatment Note/ Progress Report:     Date:  2022    Patient Name:  Magdy Hinojosa    :  1995  MRN: [id number]  Restrictions/Precautions:    Medical/Treatment Diagnosis Information:  Diagnosis: M25.512 L shoulder pain  Treatment Diagnosis: M25.512 L shoulder pain  Insurance/Certification information:   [facility]  Physician Information:  Referring Practitioner: Dr. Ronen Lozano  Has the plan of care been signed (Y/N):        [x]  Yes  []  No     Date of Patient follow up with Physician: not scheduled    Is this a Progress Report:     []  Yes  [x]  No     If Yes:  Date Range for reporting period:  Beginning:  ------------ Ending:     Progress report will be due (10 Rx or 30 days whichever is less): 08    Recertification will be due (POC Duration  / 90 days whichever is less): 22     Visit # Insurance Allowable Auth Required   In Person 9 16 visits (-23) [x]  Yes     []  No    Tele Health   []  Yes     []  No    Total 19 total       Functional Scale:   Date assessed:      Latex Allergy:  [x]NO      []YES  Preferred Language for Healthcare:   [x]English       []other:    Pain level:  2/10 shoulder blade (pressure, irritating)    SUBJECTIVE:  Pt reports she felt good for 2 weeks however the next day everything went downhill. She has had a migraine for 5 days and ? L sided cervical pain. Pt went to see PCP yesterday who ordered bloodwork. Pt ntoes she has been stressed due to things going on in her family. Note: pt contacted insurance and DN is covered and no pre-auth is required - NOTE REFERRAL FOR DN (Dr. Juliet Roach) NEXT VISIT    OBJECTIVE:   Observation:    Test measurements:    Dry needling manual therapy: consisted on the placement of 10 needles in the following muscles:  cervical multifidi C4, 5,6 LEFT and RIGHT, upper trap left, levator scapulae left, .     A 60 mm needle was inserted, piston, rotated, and coned to produce intramuscular mobilization. These techniques were used to restore functional range of motion, reduce muscle spasm and induce healing in the corresponding musculature. (69187)  Clean Technique was utilized today while applying Dry needling treatment. The treatment sites where cleaned with 70% solution of  isopropyl alcohol . The PT washed their hands and utilized treatment gloves along with hand  prior to inserting the needles. All needles where removed and discarded in the appropriate sharps container. Pt tolerated treatment well. Noted radiating/referred symptoms to L sided PSIS, to ear and eye (in distribution where pt feels her HA), and jaw. Note that this subj report has been common throughout her treatment with manual therapy. Significant twitch noted in L UT this visit.      RESTRICTIONS/PRECAUTIONS: none    Exercises/Interventions:   Therapeutic Ex (90805) Sets/sec Reps Notes/CUES HEP   Row 3 15 Black TB x   IR/ER    No money 3    3 10    10 BlackB, less pain with scap engagement  BlueTB X (blue)   pec S at wall 20\" 3  x   x   horiz ABD 2-3 10 Supine this visit, GTB x   Chin tuck 5\" 2x10  x   x   Added 11/9 x                          Note that pt uses lacrosse ball at home for self massage     Manual Intervention ([id number])            SOR, cervical up glides bilat x5'      Dry needling   See above    x5     x8'  Note significant tightness lateral L neck    x4'  prone    Thoracic manip x1  cavitation 10/12    NMR re-education (17450)   CUES NEEDED                                                                   Therapeutic Activity (70727)                                          [employer] access code:    [email or web address]           Therapeutic Exercise and NMR EXR  [x] (58988) Provided verbal/tactile cueing for activities related to strengthening, flexibility, endurance, ROM  for improvements in scapular, scapulothoracic and UE control with self care, reaching, carrying, lifting, house/yardwork, driving/computer work.    [] (31843) Provided verbal/tactile cueing for activities related to improving balance, coordination, kinesthetic sense, posture, motor skill, proprioception  to assist with  scapular, scapulothoracic and UE control with self care, reaching, carrying, lifting, house/yardwork, driving/computer work. Therapeutic Activities:    [] (51630 or 87566) Provided verbal/tactile cueing for activities related to improving balance, coordination, kinesthetic sense, posture, motor skill, proprioception and motor activation to allow for proper function of scapular, scapulothoracic and UE control with self care, carrying, lifting, driving/computer work. Home Exercise Program:    [x] (09819) Reviewed/Progressed HEP activities related to strengthening, flexibility, endurance, ROM of scapular, scapulothoracic and UE control with self care, reaching, carrying, lifting, house/yardwork, driving/computer work  [] (93335) Reviewed/Progressed HEP activities related to improving balance, coordination, kinesthetic sense, posture, motor skill, proprioception of scapular, scapulothoracic and UE control with self care, reaching, carrying, lifting, house/yardwork, driving/computer work      Manual Treatments:  PROM / STM / Oscillations-Mobs:  G-I, II, III, IV (PA's, Inf., Post.)  [x] (36098) Provided manual therapy to mobilize soft tissue/joints of cervical/CT, scapular GHJ and UE for the purpose of modulating pain, promoting relaxation,  increasing ROM, reducing/eliminating soft tissue swelling/inflammation/restriction, improving soft tissue extensibility and allowing for proper ROM for normal function with self care, reaching, carrying, lifting, house/yardwork, driving/computer work    Modalities:     [] GAME READY (VASO)- for significant edema, swelling, pain control.      Charges:  Timed Code Treatment Minutes: 50   Total Treatment Minutes:  50      [] STEVIE (LOW) 69188 (typically 20 minutes face-to-face)  [] EVAL (MOD) 76244 (typically 30 minutes face-to-face)  [] EVAL (HIGH) 68528 (typically 45 minutes face-to-face)  [] RE-EVAL     [x] XI(78959) x 2   [] IONTO  [] NMR (60294) x     [] VASO  [x] Manual (28185) x  1   [x] Other: dry needling  [] TA x      [] Mech Traction (99745)  [] ES(attended) (30003)      [] ES (un) (42022):    ASSESSMENT:  Pt exhibits continued ups and downs in pain and HA status. Pt's symptoms seem to change based on stress, amount of sleep and amount of lifting with LUE. Pt will continue to benefit from ? scap strength and stability and ? tightness throughout neck and shoulder musculature. Overall there has been significant improvement however PT continues to figure out which set of ex and treatment well produce long term benefit. GOALS:     Patient stated goal: relieve some of the pain, be able to continue my new workout     Therapist goals for Patient:   Short Term Goals: To be achieved in: 2 weeks  1. Independent in HEP and progression per patient tolerance, in order to prevent re-injury. [] Progressing: [x] Met: [] Not Met: [] Adjusted      2. Patient will have a decrease in pain to facilitate improvement in movement, function, and ADLs as indicated by Functional Deficits. [] Progressing: [x] Met: [] Not Met: [] Adjusted      Long Term Goals: To be achieved in: 12 weeks  1. Disability index score of 10% or less for the DASH to assist with reaching prior level of function. [x] Progressing: [] Met: [] Not Met: [] Adjusted      2. Patient will demonstrate increased AROM to WNL to allow for proper joint functioning as indicated by patients Functional Deficits. [x] Progressing: [] Met: [] Not Met: [] Adjusted      3. Patient will demonstrate an increase in Strength to 5/5 to allow for proper functional mobility as indicated by patients Functional Deficits. [x] Progressing: [] Met: [] Not Met: [] Adjusted      4.  Patient will return to all functional activities without increased symptoms or restriction. [x] Progressing: [] Met: [] Not Met: [] Adjusted      5. Pt will exhibit self(patient specific functional goal)    [x] Progressing: [] Met: [] Not Met: [] Adjusted        Access Code: [email or web address]  URL: [email or web address]  Date: 03/14/2022  Prepared by: Rena Cord    Exercises  Standing Pec Stretch at [address] - 1 x daily - 7 x weekly - 1 sets - 3 reps - 20 sec hold  Standing Bilateral Low Shoulder Row with Anchored Resistance - 1 x daily - 7 x weekly - 3 sets - 10 reps  Standing Shoulder Internal Rotation with Anchored Resistance - 1 x daily - 7 x weekly - 3 sets - 10 reps  Shoulder External Rotation with Anchored Resistance - 1 x daily - 7 x weekly - 3 sets - 10 reps      Overall Progression Towards Functional goals/ Treatment Progress Update:  [x] Patient is progressing as expected towards functional goals listed. [] Progression is slowed due to complexities/Impairments listed. [] Progression has been slowed due to co-morbidities. [] Plan just implemented, too soon to assess goals progression <30days   [] Goals require adjustment due to lack of progress  [] Patient is not progressing as expected and requires additional follow up with physician  [] Other    Prognosis for POC: [x] Good [] Fair  [] Poor      Patient requires continued skilled intervention: [x] Yes  [] No    Treatment/Activity Tolerance:  [x] Patient able to complete treatment  [] Patient limited by fatigue  [] Patient limited by pain    [] Patient limited by other medical complications  [] Other:       PLAN: See eval  [x] Continue per plan of care [] Alter current plan (see comments above)  [] Plan of care initiated [] Hold pending MD visit [] Discharge    Electronically signed by:  Anup Kilpatrick, PT    Note: If patient does not return for scheduled/ recommended follow up visits, this note will serve as a discharge from care along with most recent update on progress.

## 2022-12-20 ENCOUNTER — TELEPHONE (OUTPATIENT)
Dept: ENT CLINIC | Age: 27
End: 2022-12-20

## 2022-12-20 ENCOUNTER — NURSE ONLY (OUTPATIENT)
Dept: PRIMARY CARE CLINIC | Age: 27
End: 2022-12-20

## 2022-12-20 VITALS — SYSTOLIC BLOOD PRESSURE: 150 MMHG | DIASTOLIC BLOOD PRESSURE: 77 MMHG

## 2022-12-20 NOTE — TELEPHONE ENCOUNTER
Patient has not been in for allergy injections since 9/19/22. Multiple messages have been left with no return call to office.

## 2022-12-21 ENCOUNTER — HOSPITAL ENCOUNTER (OUTPATIENT)
Dept: PHYSICAL THERAPY | Age: 27
Setting detail: THERAPIES SERIES
Discharge: HOME | End: 2022-12-21
Payer: COMMERCIAL

## 2022-12-21 PROCEDURE — 20561 NDL INSJ W/O NJX 3+ MUSC: CPT

## 2022-12-21 PROCEDURE — 97110 THERAPEUTIC EXERCISES: CPT

## 2022-12-21 PROCEDURE — 97140 MANUAL THERAPY 1/> REGIONS: CPT

## 2022-12-21 NOTE — FLOWSHEET NOTE
[facility]      Physical Therapy Treatment Note/ Progress Report:     Date:  2022    Patient Name:  Sarahi Gonzales    :  1995  MRN: [id number]  Restrictions/Precautions:    Medical/Treatment Diagnosis Information:  Diagnosis: M25.512 L shoulder pain  Treatment Diagnosis: M25.512 L shoulder pain  Insurance/Certification information:   [facility]  Physician Information:  Referring Practitioner: Dr. Benjamin Teran  Has the plan of care been signed (Y/N):        [x]  Yes  []  No     Date of Patient follow up with Physician: not scheduled    Is this a Progress Report:     []  Yes  [x]  No     If Yes:  Date Range for reporting period:  Beginning:  ------------ Ending:     Progress report will be due (10 Rx or 30 days whichever is less): 8/3/41    Recertification will be due (POC Duration  / 90 days whichever is less): 22     Visit # Insurance Allowable Auth Required   In Person 10 16 visits (-23) [x]  Yes     []  No    Tele Health   []  Yes     []  No    Total 20 total       Functional Scale:   Date assessed:      Latex Allergy:  [x]NO      []YES  Preferred Language for Healthcare:   [x]English       []other:    Pain level:  1-2/10 left shoulder blade    SUBJECTIVE:  Pt reports she has felt a lot better over these last couple weeks. She had no pain just tightness. No HA. Note: pt contacted insurance and DN is covered and no pre-auth is required - NOTE REFERRAL FOR DN (Dr. Adamaris Dennis) NEXT VISIT    OBJECTIVE:   Observation:    Test measurements:    Dry needling manual therapy: consisted on the placement of 10 needles in the following muscles:  cervical multifidi C4, 5,6 LEFT and RIGHT, upper trap left, levator scapulae left, . A 60 mm needle was inserted, piston, rotated, and coned to produce intramuscular mobilization.   These techniques were used to restore functional range of motion, reduce muscle spasm and induce healing in the corresponding musculature. (46459)  Clean Technique was utilized today while applying Dry needling treatment. The treatment sites where cleaned with 70% solution of  isopropyl alcohol . The PT washed their hands and utilized treatment gloves along with hand  prior to inserting the needles. All needles where removed and discarded in the appropriate sharps container. Pt tolerated treatment well. Noted radiating/referred symptoms to L sided PSIS, to ear and eye (in distribution where pt feels her HA), and jaw. Note that this subj report has been common throughout her treatment with manual therapy. Significant twitch noted in L UT this visit.      RESTRICTIONS/PRECAUTIONS: none    Exercises/Interventions:   Therapeutic Ex (16148) Sets/sec Reps Notes/CUES HEP   Row 3 15 Black TB x   IR/ER    No money 3    3 10    10 BlackB, less pain with scap engagement  BlueTB X (blue)   pec S at wall 20\" 3  x   x   horiz ABD 3 10 Supine this visit, GTB x   Chin tuck 5\" 2x10  x   x   Prone I, T 1 10 Added 11/9 x                          Note that pt uses lacrosse ball at home for self massage     Manual Intervention (95367)            SOR, cervical up glides bilat x5'      Dry needling   See above    x5     x8'  Note significant tightness lateral L neck    x4'  prone    Thoracic manip x1  cavitation 10/12    NMR re-education (33849)   CUES NEEDED                                                                   Therapeutic Activity (85051)                                          [place] access code:    [email or web address]           Therapeutic Exercise and NMR EXR  [x] (15773) Provided verbal/tactile cueing for activities related to strengthening, flexibility, endurance, ROM  for improvements in scapular, scapulothoracic and UE control with self care, reaching, carrying, lifting, house/yardwork, driving/computer work.    [] (26025) Provided verbal/tactile cueing for activities related to improving balance, coordination, kinesthetic sense, posture, motor skill, proprioception  to assist with  scapular, scapulothoracic and UE control with self care, reaching, carrying, lifting, house/yardwork, driving/computer work. Therapeutic Activities:    [] (21809 or 69774) Provided verbal/tactile cueing for activities related to improving balance, coordination, kinesthetic sense, posture, motor skill, proprioception and motor activation to allow for proper function of scapular, scapulothoracic and UE control with self care, carrying, lifting, driving/computer work. Home Exercise Program:    [x] (38510) Reviewed/Progressed HEP activities related to strengthening, flexibility, endurance, ROM of scapular, scapulothoracic and UE control with self care, reaching, carrying, lifting, house/yardwork, driving/computer work  [] (61752) Reviewed/Progressed HEP activities related to improving balance, coordination, kinesthetic sense, posture, motor skill, proprioception of scapular, scapulothoracic and UE control with self care, reaching, carrying, lifting, house/yardwork, driving/computer work      Manual Treatments:  PROM / STM / Oscillations-Mobs:  G-I, II, III, IV (PA's, Inf., Post.)  [x] (86277) Provided manual therapy to mobilize soft tissue/joints of cervical/CT, scapular GHJ and UE for the purpose of modulating pain, promoting relaxation,  increasing ROM, reducing/eliminating soft tissue swelling/inflammation/restriction, improving soft tissue extensibility and allowing for proper ROM for normal function with self care, reaching, carrying, lifting, house/yardwork, driving/computer work    Modalities:     [] GAME READY (VASO)- for significant edema, swelling, pain control.      Charges:  Timed Code Treatment Minutes: 45   Total Treatment Minutes:  45      [] EVAL (LOW) 27044 (typically 20 minutes face-to-face)  [] EVAL (MOD) 35371 (typically 30 minutes face-to-face)  [] EVAL (HIGH) 26399 (typically 45 minutes face-to-face)  [] RE-EVAL     [x] JL(94085) x 2   [] IONTO  [] NMR (60051) x     [] VASO  [x] Manual (22443) x  1   [x] Other: dry needling  [] TA x      [] Mech Traction (45375)  [] ES(attended) (35208)      [] ES (un) (50795):    ASSESSMENT:  Pt exhibits continued ups and downs in pain and HA status. Pt's symptoms seem to change based on stress, amount of sleep and amount of lifting with LUE. Pt will continue to benefit from ? scap strength and stability and ? tightness throughout neck and shoulder musculature. Overall there has been significant improvement however PT continues to figure out which set of ex and treatment well produce long term benefit. GOALS:     Patient stated goal: relieve some of the pain, be able to continue my new workout     Therapist goals for Patient:   Short Term Goals: To be achieved in: 2 weeks  1. Independent in HEP and progression per patient tolerance, in order to prevent re-injury. [] Progressing: [x] Met: [] Not Met: [] Adjusted      2. Patient will have a decrease in pain to facilitate improvement in movement, function, and ADLs as indicated by Functional Deficits. [] Progressing: [x] Met: [] Not Met: [] Adjusted      Long Term Goals: To be achieved in: 12 weeks  1. Disability index score of 10% or less for the DASH to assist with reaching prior level of function. [x] Progressing: [] Met: [] Not Met: [] Adjusted      2. Patient will demonstrate increased AROM to WNL to allow for proper joint functioning as indicated by patients Functional Deficits. [x] Progressing: [] Met: [] Not Met: [] Adjusted      3. Patient will demonstrate an increase in Strength to 5/5 to allow for proper functional mobility as indicated by patients Functional Deficits. [x] Progressing: [] Met: [] Not Met: [] Adjusted      4. Patient will return to all functional activities without increased symptoms or restriction. [x] Progressing: [] Met: [] Not Met: [] Adjusted      5.  Pt will exhibit self(patient specific functional goal)    [x] Progressing: [] Met: [] Not Met: [] Adjusted        Access Code: [email or web address]  URL: [email or web address]  Date: 03/14/2022  Prepared by: Marcela Jones    Exercises  Standing Pec Stretch at [employer] - 1 x daily - 7 x weekly - 1 sets - 3 reps - 20 sec hold  Standing Bilateral Low Shoulder Row with Anchored Resistance - 1 x daily - 7 x weekly - 3 sets - 10 reps  Standing Shoulder Internal Rotation with Anchored Resistance - 1 x daily - 7 x weekly - 3 sets - 10 reps  Shoulder External Rotation with Anchored Resistance - 1 x daily - 7 x weekly - 3 sets - 10 reps      Overall Progression Towards Functional goals/ Treatment Progress Update:  [x] Patient is progressing as expected towards functional goals listed. [] Progression is slowed due to complexities/Impairments listed. [] Progression has been slowed due to co-morbidities. [] Plan just implemented, too soon to assess goals progression <30days   [] Goals require adjustment due to lack of progress  [] Patient is not progressing as expected and requires additional follow up with physician  [] Other    Prognosis for POC: [x] Good [] Fair  [] Poor      Patient requires continued skilled intervention: [x] Yes  [] No    Treatment/Activity Tolerance:  [x] Patient able to complete treatment  [] Patient limited by fatigue  [] Patient limited by pain    [] Patient limited by other medical complications  [] Other:       PLAN: See eval  [x] Continue per plan of care [] Alter current plan (see comments above)  [] Plan of care initiated [] Hold pending MD visit [] Discharge    Electronically signed by:  Livia Luu PT    Note: If patient does not return for scheduled/ recommended follow up visits, this note will serve as a discharge from care along with most recent update on progress.

## 2023-01-03 ENCOUNTER — PATIENT MESSAGE (OUTPATIENT)
Dept: PRIMARY CARE CLINIC | Age: 28
End: 2023-01-03

## 2023-01-03 NOTE — TELEPHONE ENCOUNTER
From: Tamy Bernal  To: Dr. Osman Markin/3/2023 3:13 AM EST  Subject: Appointment     I have an appointment this afternoon with you that I need to reschedule. My kids and I have been hit with a stomach virus overnight. I plan on calling in the morning to reschedule since I couldnt cancel online but I wanted to let you know as quick as possible.     Tamy Bernal

## 2023-01-04 ENCOUNTER — HOSPITAL ENCOUNTER (OUTPATIENT)
Dept: PHYSICAL THERAPY | Age: 28
Setting detail: THERAPIES SERIES
Discharge: HOME | End: 2023-01-04

## 2023-01-04 NOTE — FLOWSHEET NOTE
Physical Therapy  Cancellation/No-show Note  Patient Name:  Ellen Wisdom  :  1995   Date:  2023  Cancelled visits to date: 1  No-shows to date: 0    For today's appointment patient:  [x]  Cancelled  []  Rescheduled appointment  []  No-show     Reason given by patient:  []  Patient ill  []  Conflicting appointment  []  No transportation    []  Conflict with work  []  No reason given  [x]  Other: Kids are sick    Comments:      Electronically signed by:  Jennie Louis PT, PT

## 2023-01-05 RX ORDER — TOPIRAMATE 100 MG/1
TABLET, FILM COATED ORAL
Qty: 180 TABLET | Refills: 0 | Status: SHIPPED | OUTPATIENT
Start: 2023-01-05

## 2023-01-05 NOTE — TELEPHONE ENCOUNTER
Medication:   Requested Prescriptions     Pending Prescriptions Disp Refills    topiramate (TOPAMAX) 100 MG tablet [Pharmacy Med Name: TOPIRAMATE 100 MG TABLET] 180 tablet 0     Sig: TAKE 1 TABLET BY MOUTH TWICE A DAY     Last Filled:  09/30/2022    Last appt: 12/6/2022   Next appt: Visit date not found    Last OARRS: No flowsheet data found.

## 2023-01-11 ENCOUNTER — HOSPITAL ENCOUNTER (OUTPATIENT)
Dept: PHYSICAL THERAPY | Age: 28
Setting detail: THERAPIES SERIES
Discharge: HOME | End: 2023-01-11

## 2023-01-11 NOTE — FLOWSHEET NOTE
Physical Therapy  Cancellation/No-show Note  Patient Name:  Rylee Bearden  :  1995   Date:  2023  Cancelled visits to date: 1  No-shows to date: 1    For today's appointment patient:  []  Cancelled  []  Rescheduled appointment  [x]  No-show     Reason given by patient:  []  Patient ill  []  Conflicting appointment  []  No transportation    []  Conflict with work  []  No reason given  []  Other: Kids are sick    Comments:      Electronically signed by:  Adalgisa Ruffin PT, PT

## 2023-01-18 ENCOUNTER — HOSPITAL ENCOUNTER (OUTPATIENT)
Dept: PHYSICAL THERAPY | Age: 28
Setting detail: THERAPIES SERIES
Discharge: HOME | End: 2023-01-18

## 2023-01-18 NOTE — FLOWSHEET NOTE
Physical Therapy  Cancellation/No-show Note  Patient Name:  Tyrone Briones  :  1995   Date:  2023  Cancelled visits to date: 2  No-shows to date: 1    For today's appointment patient:  [x]  Cancelled  []  Rescheduled appointment  []  No-show     Reason given by patient:  []  Patient ill  []  Conflicting appointment  []  No transportation    []  Conflict with work  []  No reason given  [x]  Other: Kid has procedure   Comments:      Electronically signed by:  Gentry Rankin, PT, PT

## 2023-02-01 ENCOUNTER — APPOINTMENT (OUTPATIENT)
Dept: PHYSICAL THERAPY | Age: 28
End: 2023-02-01
Payer: COMMERCIAL

## 2023-02-13 SDOH — HEALTH STABILITY: PHYSICAL HEALTH: ON AVERAGE, HOW MANY MINUTES DO YOU ENGAGE IN EXERCISE AT THIS LEVEL?: 30 MIN

## 2023-02-13 SDOH — HEALTH STABILITY: PHYSICAL HEALTH: ON AVERAGE, HOW MANY DAYS PER WEEK DO YOU ENGAGE IN MODERATE TO STRENUOUS EXERCISE (LIKE A BRISK WALK)?: 5 DAYS

## 2023-02-14 ENCOUNTER — OFFICE VISIT (OUTPATIENT)
Dept: FAMILY MEDICINE CLINIC | Age: 28
End: 2023-02-14
Payer: COMMERCIAL

## 2023-02-14 VITALS
SYSTOLIC BLOOD PRESSURE: 136 MMHG | TEMPERATURE: 98.2 F | HEIGHT: 63 IN | BODY MASS INDEX: 33.42 KG/M2 | WEIGHT: 188.6 LBS | DIASTOLIC BLOOD PRESSURE: 84 MMHG | OXYGEN SATURATION: 98 % | RESPIRATION RATE: 16 BRPM | HEART RATE: 105 BPM

## 2023-02-14 DIAGNOSIS — M25.50 ARTHRALGIA, UNSPECIFIED JOINT: Primary | ICD-10-CM

## 2023-02-14 DIAGNOSIS — R70.0 ELEVATED ERYTHROCYTE SEDIMENTATION RATE: ICD-10-CM

## 2023-02-14 PROCEDURE — 99213 OFFICE O/P EST LOW 20 MIN: CPT | Performed by: FAMILY MEDICINE

## 2023-02-14 PROCEDURE — G8427 DOCREV CUR MEDS BY ELIG CLIN: HCPCS | Performed by: FAMILY MEDICINE

## 2023-02-14 PROCEDURE — 1036F TOBACCO NON-USER: CPT | Performed by: FAMILY MEDICINE

## 2023-02-14 PROCEDURE — G8417 CALC BMI ABV UP PARAM F/U: HCPCS | Performed by: FAMILY MEDICINE

## 2023-02-14 PROCEDURE — G8484 FLU IMMUNIZE NO ADMIN: HCPCS | Performed by: FAMILY MEDICINE

## 2023-02-14 RX ORDER — NAPROXEN SODIUM 550 MG/1
550 TABLET ORAL 2 TIMES DAILY WITH MEALS
COMMUNITY

## 2023-02-14 SDOH — ECONOMIC STABILITY: INCOME INSECURITY: HOW HARD IS IT FOR YOU TO PAY FOR THE VERY BASICS LIKE FOOD, HOUSING, MEDICAL CARE, AND HEATING?: NOT VERY HARD

## 2023-02-14 SDOH — ECONOMIC STABILITY: FOOD INSECURITY: WITHIN THE PAST 12 MONTHS, THE FOOD YOU BOUGHT JUST DIDN'T LAST AND YOU DIDN'T HAVE MONEY TO GET MORE.: NEVER TRUE

## 2023-02-14 SDOH — ECONOMIC STABILITY: FOOD INSECURITY: WITHIN THE PAST 12 MONTHS, YOU WORRIED THAT YOUR FOOD WOULD RUN OUT BEFORE YOU GOT MONEY TO BUY MORE.: NEVER TRUE

## 2023-02-14 ASSESSMENT — PATIENT HEALTH QUESTIONNAIRE - PHQ9
4. FEELING TIRED OR HAVING LITTLE ENERGY: 3
SUM OF ALL RESPONSES TO PHQ9 QUESTIONS 1 & 2: 3
SUM OF ALL RESPONSES TO PHQ QUESTIONS 1-9: 18
7. TROUBLE CONCENTRATING ON THINGS, SUCH AS READING THE NEWSPAPER OR WATCHING TELEVISION: 3
6. FEELING BAD ABOUT YOURSELF - OR THAT YOU ARE A FAILURE OR HAVE LET YOURSELF OR YOUR FAMILY DOWN: 2
3. TROUBLE FALLING OR STAYING ASLEEP: 1
SUM OF ALL RESPONSES TO PHQ QUESTIONS 1-9: 18
SUM OF ALL RESPONSES TO PHQ QUESTIONS 1-9: 18
5. POOR APPETITE OR OVEREATING: 3
1. LITTLE INTEREST OR PLEASURE IN DOING THINGS: 2
SUM OF ALL RESPONSES TO PHQ QUESTIONS 1-9: 18
8. MOVING OR SPEAKING SO SLOWLY THAT OTHER PEOPLE COULD HAVE NOTICED. OR THE OPPOSITE, BEING SO FIGETY OR RESTLESS THAT YOU HAVE BEEN MOVING AROUND A LOT MORE THAN USUAL: 3
9. THOUGHTS THAT YOU WOULD BE BETTER OFF DEAD, OR OF HURTING YOURSELF: 0
10. IF YOU CHECKED OFF ANY PROBLEMS, HOW DIFFICULT HAVE THESE PROBLEMS MADE IT FOR YOU TO DO YOUR WORK, TAKE CARE OF THINGS AT HOME, OR GET ALONG WITH OTHER PEOPLE: 3
2. FEELING DOWN, DEPRESSED OR HOPELESS: 1

## 2023-02-14 ASSESSMENT — ENCOUNTER SYMPTOMS
RHINORRHEA: 0
COUGH: 0
EYE PAIN: 0
EYE ITCHING: 0
SINUS PRESSURE: 0
WHEEZING: 0
EYES NEGATIVE: 1
SHORTNESS OF BREATH: 0
ABDOMINAL PAIN: 0
SORE THROAT: 0

## 2023-02-14 NOTE — PROGRESS NOTES
Key Oneil (:  1995) is a 32 y.o. female,New patient, here for evaluation of the following chief complaint(s):  New Patient and Other (Body pains: worse in hands and feet- has had all normal blood work in the past )          Subjective   SUBJECTIVE/OBJECTIVE:  HPI  24-year-old female patient seen today to establish care. Patient has a history of ADHD and is currently seeing a psychiatrist.  She has finished her therapy, she is on Ritalin    Patient complains of a lot of joint pains and myalgias,  Recent work-up done by previous PCP which is unremarkable except for an sed rate of 37. Patient also complains of pain in small joints of the hand knees hips and knees and foot and she also complains of her hands and feet feeling cold all the time. She does have severe migraines for which she is currently working with a unit neurologist at [facility]. Review of Systems   Constitutional: Negative. Negative for fatigue. HENT:  Negative for congestion, ear pain, rhinorrhea, sinus pressure and sore throat. Eyes: Negative. Negative for pain and itching. Respiratory:  Negative for cough, shortness of breath and wheezing. Cardiovascular:  Negative for chest pain and palpitations. Gastrointestinal:  Negative for abdominal pain. Genitourinary:  Negative for frequency and urgency. Musculoskeletal:  Positive for arthralgias. Negative for gait problem. Skin:  Negative for rash. Neurological:  Negative for dizziness and headaches. Psychiatric/Behavioral:  The patient is not nervous/anxious. Objective   Physical Exam  Constitutional:       Appearance: Normal appearance. HENT:      Head: Normocephalic and atraumatic. Right Ear: Tympanic membrane, ear canal and external ear normal.      Left Ear: Tympanic membrane, ear canal and external ear normal.      Nose: Nose normal. No congestion or rhinorrhea.       Mouth/Throat:      Mouth: Mucous membranes are moist.      Pharynx: Oropharynx is clear. No oropharyngeal exudate or posterior oropharyngeal erythema. Eyes:      Extraocular Movements: Extraocular movements intact. Conjunctiva/sclera: Conjunctivae normal.      Pupils: Pupils are equal, round, and reactive to light. Neck:      Vascular: No carotid bruit. Cardiovascular:      Rate and Rhythm: Normal rate and regular rhythm. Pulses: Normal pulses. Heart sounds: Normal heart sounds, S1 normal and S2 normal. No murmur heard. Pulmonary:      Effort: Pulmonary effort is normal. No respiratory distress. Breath sounds: Normal breath sounds. No wheezing, rhonchi or rales. Abdominal:      General: Bowel sounds are normal. There is no distension. Palpations: Abdomen is soft. Tenderness: There is no abdominal tenderness. There is no right CVA tenderness, left CVA tenderness, guarding or rebound. Musculoskeletal:         General: No swelling or tenderness. Normal range of motion. Cervical back: Normal range of motion and neck supple. No rigidity or tenderness. Right lower leg: No edema. Left lower leg: No edema. Lymphadenopathy:      Cervical: No cervical adenopathy. Skin:     General: Skin is warm and dry. Findings: No bruising or erythema. Neurological:      General: No focal deficit present. Mental Status: She is alert and oriented to person, place, and time. Psychiatric:         Mood and Affect: Mood normal.         Behavior: Behavior normal.                   ASSESSMENT/PLAN:  1. Arthralgia, unspecified joint  -     Vitamin D 25 Hydroxy; Future  2. Elevated erythrocyte sedimentation rate  ANAND ordered. No follow-ups on file. An electronic signature was used to authenticate this note. --Erin Benavidez MD     This note was generated completely or in part utilizing Dragon dictation speech recognition software.   Occasionally, words are mistranscribed and despite editing, the text may contain inaccuracies due to incorrect word recognition.   If further clarification is needed please contact the office at [phone]

## 2023-02-15 ENCOUNTER — HOSPITAL ENCOUNTER (OUTPATIENT)
Dept: PHYSICAL THERAPY | Age: 28
Setting detail: THERAPIES SERIES
Discharge: HOME | End: 2023-02-15
Payer: COMMERCIAL

## 2023-02-15 PROCEDURE — 97140 MANUAL THERAPY 1/> REGIONS: CPT

## 2023-02-15 PROCEDURE — 97110 THERAPEUTIC EXERCISES: CPT

## 2023-02-15 NOTE — PROGRESS NOTES
The [address] [facility],[address], THE [facility] AT [place]     Physical Therapy Re-Certification Plan of Chris Us    Dear  Dr. Link Velasquez,    We had the pleasure of treating the following patient for physical therapy services at [address] [place] [address]. A summary of our findings can be found in the updated assessment below. This includes our plan of care. If you have any questions or concerns regarding these findings, please do not hesitate to contact me at the office phone number checked above. Thank you for the referral.     Physician Signature:________________________________Date:__________________  By signing above (or electronic signature), therapists plan is approved by physician    Date Range Of Visits: 3/14/22 - 2/15/23  Total Visits to Date: 34  Overall Response to Treatment:   []Patient is responding well to treatment and improvement is noted with regards  to goals   []Patient should continue to improve in reasonable time if they continue HEP   [x]Patient has plateaued and is no longer responding to skilled PT intervention    []Patient is getting worse and would benefit from return to referring MD   []Patient unable to adhere to initial POC   [x]Other:  Pt has been seen in PT for almost a year now, with some time spaced between appointments at different points. Pt has been treated for her left shoulder and neck pain to address ? strength, ROM, and stability of both cervical spine and shoulder girdle. At this follow up, during an extensive conversation, pt reports minimal to no change overall. However, to note, is pt has had periods of time when she felt really good, coinciding with consistent PT, including dry needling. Pt continues to be compliant with HEP at least x4/wk. Pt will D/C from PT at this time given symptom plateau but will follow up in the future if necessary.    Physical Therapy Treatment Note/ Progress Report:     Date:  2/15/2023    Patient Name:  Shravan Sargent Tone Peguero    :  1995  MRN: [id number]  Restrictions/Precautions:    Medical/Treatment Diagnosis Information:  Diagnosis: M25.512 L shoulder pain  Treatment Diagnosis: M25.512 L shoulder pain  Insurance/Certification information:   [facility]  Physician Information:  Referring Practitioner: Dr. Jl To / pt has switched to a different PCP, Dr. Yeyo Beckham  Has the plan of care been signed (Y/N):        [x]  Yes  []  No     Date of Patient follow up with Physician: not scheduled    Is this a Progress Report:     [x]  Yes  [x]  No     If Yes:  Date Range for reporting period:  Beginning:  ------------ Ending:     Progress report will be due (10 Rx or 30 days whichever is less): 35    Recertification will be due (POC Duration  / 90 days whichever is less): 4/15/23     Visit # Insurance Allowable Auth Required   In Person 11 16 visits (-23) [x]  Yes     []  No    [facility]   []  Yes     []  No    Total 29 total       Functional Scale:   Date assessed:      Latex Allergy:  [x]NO      []YES  Preferred Language for Healthcare:   [x]English       []other:    Pain level:  2/10 left shoulder blade    SUBJECTIVE:  Pt returns after almost 2 months away from PT, due to personal reasons. Pt switched PCPs and had her first visit yesterday. They are ordering more bloodwork due to a high ESR rate. Pt also saw neurologist who recommended some medication changes due to recent severe HA (mainly at time of menstrual cycle). Note: pt contacted insurance and DN is covered and no pre-auth is required - NOTE REFERRAL FOR DN (Dr. Musa Collazo)     OBJECTIVE:   Observation:    Test measurements:  2/15  Cervical ROM: flex 60 ° tugging, 30 ° , R 20 ° , L 10 ° p! Shoulder ROM: flex R 180 ° , L 170 ° p!, ABD R 180 ° , L 160 ° p!   Shoulder MMT: flex R 5/5, L 4+/5 p!, ABD R 5/5, L 4+/5 p!, ER R 5/5, L 4/5 p!, IR R 5/5, L 4+/5    RESTRICTIONS/PRECAUTIONS: none    Exercises/Interventions:   Therapeutic Ex (03021) Sets/sec Reps Notes/CUES HEP   Row 3 15 Black TB x   IR/ER    No money 3    3 10    10 BlackB, less pain with scap engagement  BlueTB X (blue)   pec S at wall 20\" 3  x   x   Supine this visit, GTB x    x   x   Added 11/9 x                          Note that pt uses lacrosse ball at home for self massage     Manual Intervention ([id number])            SOR, cervical up glides bilat x2'        See above    SL scap mobs, STM kj scap (LS) x3'      STM upper trap, cervical paraspinals x8'  Note significant tightness lateral L neck    x4'  prone    Thoracic manip x1  cavitation 10/12    NMR re-education (14479)   CUES NEEDED                                                                   Therapeutic Activity (14104)                                          Sitesimon access code:    [email or web address]           Therapeutic Exercise and NMR EXR  [x] (76048) Provided verbal/tactile cueing for activities related to strengthening, flexibility, endurance, ROM  for improvements in scapular, scapulothoracic and UE control with self care, reaching, carrying, lifting, house/yardwork, driving/computer work.    [] (44380) Provided verbal/tactile cueing for activities related to improving balance, coordination, kinesthetic sense, posture, motor skill, proprioception  to assist with  scapular, scapulothoracic and UE control with self care, reaching, carrying, lifting, house/yardwork, driving/computer work. Therapeutic Activities:    [] (06198 or 74519) Provided verbal/tactile cueing for activities related to improving balance, coordination, kinesthetic sense, posture, motor skill, proprioception and motor activation to allow for proper function of scapular, scapulothoracic and UE control with self care, carrying, lifting, driving/computer work.      Home Exercise Program:    [x] (09067) Reviewed/Progressed HEP activities related to strengthening, flexibility, endurance, ROM of scapular, scapulothoracic and UE control with self care, reaching, carrying, lifting, house/yardwork, driving/computer work  [] (18680) Reviewed/Progressed HEP activities related to improving balance, coordination, kinesthetic sense, posture, motor skill, proprioception of scapular, scapulothoracic and UE control with self care, reaching, carrying, lifting, house/yardwork, driving/computer work      Manual Treatments:  PROM / STM / Oscillations-Mobs:  G-I, II, III, IV (PA's, Inf., Post.)  [x] (22476) Provided manual therapy to mobilize soft tissue/joints of cervical/CT, scapular GHJ and UE for the purpose of modulating pain, promoting relaxation,  increasing ROM, reducing/eliminating soft tissue swelling/inflammation/restriction, improving soft tissue extensibility and allowing for proper ROM for normal function with self care, reaching, carrying, lifting, house/yardwork, driving/computer work    Modalities:     [] GAME READY (VASO)- for significant edema, swelling, pain control. Charges:  Timed Code Treatment Minutes: 40   Total Treatment Minutes:  40      [] EVAL (LOW) 92963 (typically 20 minutes face-to-face)  [] EVAL (MOD) 08357 (typically 30 minutes face-to-face)  [] EVAL (HIGH) 40973 (typically 45 minutes face-to-face)  [] RE-EVAL     [x] SI(45883) x 2   [] IONTO  [] NMR (14172) x     [] VASO  [x] Manual (56364) x  1   [] Other: dry needling  [] TA x      [] Mech Traction (91502)  [] ES(attended) (44231)      [] ES (un) (27546):    ASSESSMENT: See above    GOALS:     Patient stated goal: relieve some of the pain, be able to continue my new workout     Therapist goals for Patient:   Short Term Goals: To be achieved in: 2 weeks  1. Independent in HEP and progression per patient tolerance, in order to prevent re-injury. [] Progressing: [x] Met: [] Not Met: [] Adjusted      2. Patient will have a decrease in pain to facilitate improvement in movement, function, and ADLs as indicated by Functional Deficits. [] Progressing: [x] Met: [] Not Met: [] Adjusted      Long Term Goals:  To be achieved in: 12 weeks  1. Disability index score of 10% or less for the DASH to assist with reaching prior level of function. [x] Progressing: [] Met: [] Not Met: [] Adjusted      2. Patient will demonstrate increased AROM to WNL to allow for proper joint functioning as indicated by patients Functional Deficits. [x] Progressing: [] Met: [] Not Met: [] Adjusted      3. Patient will demonstrate an increase in Strength to 5/5 to allow for proper functional mobility as indicated by patients Functional Deficits. [x] Progressing: [] Met: [] Not Met: [] Adjusted      4. Patient will return to all functional activities without increased symptoms or restriction. [x] Progressing: [] Met: [] Not Met: [] Adjusted      5. Pt will exhibit self posture correction. (patient specific functional goal)    [] Progressing: [x] Met: [] Not Met: [] Adjusted        Access Code: [email or web address]  URL: [email or web address]  Date: 03/14/2022  Prepared by: Shu Ramírez    Exercises  Standing Pec Stretch at [place] - 1 x daily - 7 x weekly - 1 sets - 3 reps - 20 sec hold  Standing Bilateral Low Shoulder Row with Anchored Resistance - 1 x daily - 7 x weekly - 3 sets - 10 reps  Standing Shoulder Internal Rotation with Anchored Resistance - 1 x daily - 7 x weekly - 3 sets - 10 reps  Shoulder External Rotation with Anchored Resistance - 1 x daily - 7 x weekly - 3 sets - 10 reps      Overall Progression Towards Functional goals/ Treatment Progress Update:  [] Patient is progressing as expected towards functional goals listed. [] Progression is slowed due to complexities/Impairments listed. [] Progression has been slowed due to co-morbidities.   [] Plan just implemented, too soon to assess goals progression <30days   [] Goals require adjustment due to lack of progress  [x] Patient is not progressing as expected and requires additional follow up with physician  [] Other    Prognosis for POC: [x] Good [] Fair  [] Poor      Patient requires continued skilled intervention: [x] Yes  [] No    Treatment/Activity Tolerance:  [x] Patient able to complete treatment  [] Patient limited by fatigue  [] Patient limited by pain    [] Patient limited by other medical complications  [] Other:       PLAN: See eval  [] Continue per plan of care [] Alter current plan (see comments above)  [] Plan of care initiated [] Hold pending MD visit [x] Discharge    Electronically signed by:  Corry Noriega PT    Note: If patient does not return for scheduled/ recommended follow up visits, this note will serve as a discharge from care along with most recent update on progress.

## 2023-03-04 ENCOUNTER — HOSPITAL ENCOUNTER (EMERGENCY)
Age: 28
Discharge: HOME | End: 2023-03-04
Attending: STUDENT IN AN ORGANIZED HEALTH CARE EDUCATION/TRAINING PROGRAM
Payer: COMMERCIAL

## 2023-03-04 VITALS
OXYGEN SATURATION: 97 % | HEIGHT: 60 IN | WEIGHT: 191.7 LBS | TEMPERATURE: 98.6 F | RESPIRATION RATE: 17 BRPM | HEART RATE: 86 BPM | DIASTOLIC BLOOD PRESSURE: 80 MMHG | BODY MASS INDEX: 37.63 KG/M2 | SYSTOLIC BLOOD PRESSURE: 133 MMHG

## 2023-03-04 DIAGNOSIS — M25.512 NONTRAUMATIC PAIN OF LEFT SHOULDER: Primary | ICD-10-CM

## 2023-03-04 PROCEDURE — 99284 EMERGENCY DEPT VISIT MOD MDM: CPT

## 2023-03-04 PROCEDURE — 6370000000 HC RX 637 (ALT 250 FOR IP): Performed by: STUDENT IN AN ORGANIZED HEALTH CARE EDUCATION/TRAINING PROGRAM

## 2023-03-04 PROCEDURE — 96372 THER/PROPH/DIAG INJ SC/IM: CPT

## 2023-03-04 PROCEDURE — 6360000002 HC RX W HCPCS: Performed by: STUDENT IN AN ORGANIZED HEALTH CARE EDUCATION/TRAINING PROGRAM

## 2023-03-04 RX ORDER — LIDOCAINE 4 G/G
1 PATCH TOPICAL ONCE
Status: DISCONTINUED | OUTPATIENT
Start: 2023-03-04 | End: 2023-03-04 | Stop reason: HOSPADM

## 2023-03-04 RX ORDER — KETOROLAC TROMETHAMINE 30 MG/ML
15 INJECTION, SOLUTION INTRAMUSCULAR; INTRAVENOUS ONCE
Status: COMPLETED | OUTPATIENT
Start: 2023-03-04 | End: 2023-03-04

## 2023-03-04 RX ADMIN — KETOROLAC TROMETHAMINE 15 MG: 30 INJECTION, SOLUTION INTRAMUSCULAR; INTRAVENOUS at 08:53

## 2023-03-04 ASSESSMENT — PAIN SCALES - GENERAL
PAINLEVEL_OUTOF10: 7
PAINLEVEL_OUTOF10: 7

## 2023-03-04 ASSESSMENT — ENCOUNTER SYMPTOMS
SHORTNESS OF BREATH: 0
BACK PAIN: 0
VOMITING: 0
NAUSEA: 0
COUGH: 0
RHINORRHEA: 0
ABDOMINAL PAIN: 0

## 2023-03-04 ASSESSMENT — PAIN DESCRIPTION - ORIENTATION: ORIENTATION: LEFT

## 2023-03-04 ASSESSMENT — PAIN DESCRIPTION - DESCRIPTORS: DESCRIPTORS: DULL

## 2023-03-04 ASSESSMENT — PAIN - FUNCTIONAL ASSESSMENT: PAIN_FUNCTIONAL_ASSESSMENT: 0-10

## 2023-03-04 ASSESSMENT — PAIN DESCRIPTION - PAIN TYPE: TYPE: ACUTE PAIN

## 2023-03-04 ASSESSMENT — PAIN DESCRIPTION - FREQUENCY: FREQUENCY: CONTINUOUS

## 2023-03-04 ASSESSMENT — PAIN DESCRIPTION - LOCATION: LOCATION: ARM;SHOULDER

## 2023-03-04 NOTE — DISCHARGE INSTRUCTIONS
You are seen in the emergency department for an acute exacerbation of your chronic shoulder pain. You were treated with Toradol and a Lidoderm patch. You should follow-up with your PCP for further care.     You should return to the emergency department if:  -You have new or worsening pain  -You have numbness or weakness in your arm  -You have any other symptoms you find concerning

## 2023-03-04 NOTE — ED PROVIDER NOTES
[address] [facility]          ATTENDING PHYSICIAN NOTE       Date of evaluation: 3/4/2023    Chief Complaint     Arm Pain    History of Present Illness     Kim Smith is a 25 y.o. female who presents with left shoulder pain. Patient states she has a history of chronic left shoulder and arm pain that has been ongoing for at least 3 years. She has had an extensive work-up of this, including multiple MRIs without an obvious cause for her symptoms. She has done physical therapy and had dry needling. She is also working with her neurologist and her PCP. She started a new injectable medication for migraines last night and subsequently developed worsening pain in her left shoulder, elbow, wrist, and hand. She denies precipitating trauma. There has not been any redness or swelling in her joints. She characterizes her pain as typical of her joint pain but just more severe than normal.    ASSESSMENT / PLAN  (MEDICAL DECISION MAKING)     INITIAL VITALS: BP: 133/80, Temp: 98.6 °F (37 °C), Heart Rate: 86, Resp: 17, SpO2: 97 %      Kim Smith is a 25 y.o. female who presented with acute on chronic left shoulder pain in the setting of starting a new migraine medication. She has a multiyear history of joint pain, predominantly in her left shoulder and arm, that has thus far eluded diagnosis. She developed worsening pain in her shoulder, elbow, wrist, and hands last night after taking the first dose of a new medication for migraine treatment. On arrival, she was hemodynamically stable and in no acute distress. She had muscular tenderness to palpation over her left trapezius but no bony tenderness palpation in her left arm or shoulder. She did have intact active range of motion at the elbow, wrist, and in her hand with some range of motion limitation by pain in her left shoulder. There were no overlying skin changes.   She was treated symptomatically with a Lidoderm patch and IM Toradol. On reassessment, she had significant improvement in her pain and was able to fully range her left shoulder, though she did report some residual pain moving past 90 degrees of abduction. Based on her exam and history, I have extremely low concern for septic arthritis. Similarly, I have low concern for a acute traumatic injury as patient denies any obvious preceding trauma. She already follows closely with her PCP and neurologist for her symptoms, and even the chronicity of them, I feel that she is appropriate for discharge with ongoing outpatient follow-up. At this time, patient has been deemed safe for discharge. Discharge directions, including strict return precautions for new or worsening symptoms, have been communicated. Medical Decision Making  Risk  OTC drugs. Prescription drug management. Clinical Impression     1. Nontraumatic pain of left shoulder        Disposition     PATIENT REFERRED TO:  MD Tania Ell[place] [id number]  [phone]    Schedule an appointment as soon as possible for a visit   For follow up    DISCHARGE MEDICATIONS:  Discharge Medication List as of 3/4/2023 10:23 AM          DISPOSITION Decision To Discharge 03/04/2023 10:15:05 AM        Diagnostic Results and Other Data       RADIOLOGY:  No orders to display       LABS:   No results found for this visit on 03/04/23. EKG   None    ED BEDSIDE ULTRASOUND:  No results found. MOST RECENT VITALS:  BP: 133/80,Temp: 98.6 °F (37 °C), Heart Rate: 86, Resp: 17, SpO2: 97 %     Procedures     None    ED Course     Nursing Notes, Past Medical Hx, Past Surgical Hx, Social Hx,Allergies, and Family Hx were reviewed.          The patient was given the following medications:  Orders Placed This Encounter   Medications    ketorolac (TORADOL) injection 15 mg    lidocaine 4 % external patch 1 patch       CONSULTS:  None    Review of Systems     Review of Systems   Constitutional:  Negative for chills and fever. HENT:  Negative for congestion and rhinorrhea. Eyes:  Negative for visual disturbance. Respiratory:  Negative for cough and shortness of breath. Cardiovascular:  Negative for chest pain and palpitations. Gastrointestinal:  Negative for abdominal pain, nausea and vomiting. Genitourinary:  Negative for dysuria. Musculoskeletal:  Positive for arthralgias. Negative for back pain, gait problem, joint swelling and myalgias. Skin:  Negative for pallor, rash and wound. Neurological:  Positive for headaches. Negative for dizziness, weakness and numbness. Psychiatric/Behavioral:  Negative for confusion. Past Medical, Surgical, Family, and Social History     She has a past medical history of ADHD (attention deficit hyperactivity disorder), Anxiety, Atypical migraine, Depression, Gestational diabetes, H/O drug abuse ([address] IBS (irritable bowel syndrome), Levoscoliosis, Obese, and PCOS (polycystic ovarian syndrome). She has a past surgical history that includes Mustang tooth extraction; Colonoscopy; Endoscopy, colon, diagnostic; and Septoplasty (N/A, 7/26/2021). Her family history includes Diabetes in her maternal grandfather; Heart Disease in her maternal grandfather and maternal grandmother; High Blood Pressure in her maternal grandfather; High Cholesterol in her maternal grandfather; Other in her maternal grandmother. She reports that she has never smoked. She has never used smokeless tobacco. She reports that she does not currently use alcohol. She reports that she does not currently use drugs.     Medications     Discharge Medication List as of 3/4/2023 10:23 AM        CONTINUE these medications which have NOT CHANGED    Details   naproxen sodium (ANAPROX) 550 MG tablet Take 550 mg by mouth 2 times daily (with meals)Historical Med      Multiple Vitamin (MULTIVITAMIN PO) Take by mouthHistorical Med      UNABLE TO FIND CBD lotionHistorical Med      topiramate (TOPAMAX) 100 MG tablet TAKE 1 TABLET BY MOUTH TWICE A DAY, Disp-180 tablet, R-0Normal      fluticasone (FLONASE) 50 MCG/ACT nasal spray 2 sprays by Nasal route in the morning and at bedtime, Disp-2 each, R-11Normal      SUMAtriptan (IMITREX) 100 MG tablet Take 100 mg by mouth once as needed for MigraineHistorical Med      methylphenidate (RITALIN) 5 MG tablet Take 10 mg by mouth 2 times daily. Daisy Andrews through [facility] raised the dosage. Historical Med      cyclobenzaprine (FLEXERIL) 5 MG tablet Take 1 tab nightly and as neededHistorical Med      cetirizine (ZYRTEC) 10 MG tablet Take 10 mg by mouth dailyHistorical Med      albuterol sulfate HFA (VENTOLIN HFA) 108 (90 Base) MCG/ACT inhaler Inhale 2 puffs into the lungs every 6 hours as needed for Wheezing or Shortness of Breath (cough), Disp-54 g, R-1Normal      metFORMIN (GLUCOPHAGE) 500 MG tablet Take 500 mg by mouth daily (with breakfast) Take 500 mg in the morningHistorical Med      acyclovir (ZOVIRAX) 400 MG tablet Take 400 mg by mouth as neededHistorical Med             Allergies     She is allergic to nickel and lexapro [escitalopram oxalate]. Physical Exam     INITIAL VITALS: BP: 133/80, Temp: 98.6 °F (37 °C), Heart Rate: 86, Resp: 17, SpO2: 97 %   Physical Exam  Constitutional:       General: She is not in acute distress. Appearance: She is not toxic-appearing. HENT:      Head: Normocephalic and atraumatic. Nose: No congestion or rhinorrhea. Mouth/Throat:      Mouth: Mucous membranes are moist.   Eyes:      Extraocular Movements: Extraocular movements intact. Conjunctiva/sclera: Conjunctivae normal.   Cardiovascular:      Rate and Rhythm: Normal rate and regular rhythm. Pulses: Normal pulses. Heart sounds: Normal heart sounds. Pulmonary:      Effort: Pulmonary effort is normal.      Breath sounds: Normal breath sounds. Abdominal:      General: Abdomen is flat. Palpations: Abdomen is soft. Musculoskeletal:         General: Tenderness present. No swelling. Cervical back: Neck supple. Comments: TTP over left trapezius. No bony tenderness over scapula, humerus, elbow, forearm, wrist, or hand. No evidence of joint swelling or overlying erythema. Generally intact active range of motion with some pain limitation at the shoulder. Skin:     General: Skin is warm and dry. Findings: No bruising or erythema. Comments: 3 cm x 3 cm area of redness overlying upper thoracic back without associated tenderness to palpation; appears consistent with pressure related skin changes rather than cellulitis. Neurological:      General: No focal deficit present. Mental Status: She is alert. Sensory: No sensory deficit. Motor: No weakness.    Psychiatric:         Mood and Affect: Mood normal.         Behavior: Behavior normal.                  Vitor Chu MD  03/04/23 1859

## 2023-03-08 ENCOUNTER — OFFICE VISIT (OUTPATIENT)
Dept: FAMILY MEDICINE CLINIC | Age: 28
End: 2023-03-08
Payer: COMMERCIAL

## 2023-03-08 VITALS
WEIGHT: 187.4 LBS | BODY MASS INDEX: 36.79 KG/M2 | DIASTOLIC BLOOD PRESSURE: 78 MMHG | HEIGHT: 60 IN | SYSTOLIC BLOOD PRESSURE: 130 MMHG | HEART RATE: 92 BPM | OXYGEN SATURATION: 98 % | RESPIRATION RATE: 16 BRPM | TEMPERATURE: 97.7 F

## 2023-03-08 DIAGNOSIS — I73.00 RAYNAUD'S PHENOMENON WITHOUT GANGRENE: Primary | ICD-10-CM

## 2023-03-08 PROCEDURE — 99213 OFFICE O/P EST LOW 20 MIN: CPT | Performed by: FAMILY MEDICINE

## 2023-03-08 PROCEDURE — 1036F TOBACCO NON-USER: CPT | Performed by: FAMILY MEDICINE

## 2023-03-08 PROCEDURE — G8427 DOCREV CUR MEDS BY ELIG CLIN: HCPCS | Performed by: FAMILY MEDICINE

## 2023-03-08 PROCEDURE — G8484 FLU IMMUNIZE NO ADMIN: HCPCS | Performed by: FAMILY MEDICINE

## 2023-03-08 PROCEDURE — G8417 CALC BMI ABV UP PARAM F/U: HCPCS | Performed by: FAMILY MEDICINE

## 2023-03-08 ASSESSMENT — ENCOUNTER SYMPTOMS
EYES NEGATIVE: 1
RHINORRHEA: 0
ABDOMINAL PAIN: 0
COLOR CHANGE: 1
SORE THROAT: 0
SINUS PRESSURE: 0
EYE PAIN: 0
COUGH: 0
WHEEZING: 0
SHORTNESS OF BREATH: 0
EYE ITCHING: 0

## 2023-03-08 NOTE — PROGRESS NOTES
Matheus Liu (:  1995) is a 25 y.o. female,Established patient, here for evaluation of the following chief complaint(s):  Follow-Up from Hospital and Other (Neurology follow up: suspected reynauds )          Subjective   SUBJECTIVE/OBJECTIVE:  HPI  25-year-old female patient here for follow-up. Patient states last week she saw her neurologist in  and she had started a new treatment for migraine. Shortly after the patient took the injection, she developed worsening pain in her left shoulder elbow wrist and hand. She went to the ER, records reviewed she was treated symptomatically and discharged. Patient saw her neurologist and the neurologist advised her that this could be a common reaction to the medication patient with raynaud. She was also taken off the medication and restarted on candesartan for her migraines. Her neurologist advised her to talk to the PCP about the knots phenomenon. Patient does report that her fingers turn white and freezing and numb and cold followed by some redness for quite a bit of time now. Patient has not picked up the candesartan medication yet. Vitamin D deficiency-patient is taking over-the-counter vitamin D, feels a little more energetic but states her aches and pains are still there. Review of Systems   Constitutional: Negative. Negative for fatigue. HENT:  Negative for congestion, ear pain, rhinorrhea, sinus pressure and sore throat. Eyes: Negative. Negative for pain and itching. Respiratory:  Negative for cough, shortness of breath and wheezing. Cardiovascular:  Negative for chest pain and palpitations. Gastrointestinal:  Negative for abdominal pain. Genitourinary:  Negative for frequency and urgency. Musculoskeletal:  Positive for arthralgias and myalgias. Negative for gait problem. Skin:  Positive for color change. Negative for rash. Neurological:  Negative for dizziness and headaches.    Psychiatric/Behavioral:  The patient is not nervous/anxious. Objective   Physical Exam  Constitutional:       Appearance: Normal appearance. HENT:      Head: Normocephalic and atraumatic. Cardiovascular:      Rate and Rhythm: Normal rate and regular rhythm. Pulmonary:      Effort: Pulmonary effort is normal.      Breath sounds: Normal breath sounds. No wheezing. Neurological:      Mental Status: She is alert. Psychiatric:         Mood and Affect: Mood normal.                   ASSESSMENT/PLAN:  1. Raynaud's phenomenon without gangrene  Symptoms suggestive of Raynaud's phenomenon, will like to see if the vasodilator candesartan could help with the symptoms. Patient yet to  the medication from the pharmacy  Advised patient to try the medication for 2 to 3 weeks and return for follow-up soon after. If there is no improvement in symptoms will refer to rheumatology for further work-up. Concepción Sutherland Return in about 4 weeks (around 4/5/2023). An electronic signature was used to authenticate this note. --Fortino Marquez MD     This note was generated completely or in part utilizing Dragon dictation speech recognition software. Occasionally, words are mistranscribed and despite editing, the text may contain inaccuracies due to incorrect word recognition.   If further clarification is needed please contact the office at [phone]

## 2023-06-02 ENCOUNTER — OFFICE VISIT (OUTPATIENT)
Dept: FAMILY MEDICINE CLINIC | Age: 28
End: 2023-06-02

## 2023-06-02 VITALS
BODY MASS INDEX: 36.93 KG/M2 | SYSTOLIC BLOOD PRESSURE: 136 MMHG | HEIGHT: 60 IN | OXYGEN SATURATION: 99 % | RESPIRATION RATE: 16 BRPM | HEART RATE: 91 BPM | WEIGHT: 188.1 LBS | DIASTOLIC BLOOD PRESSURE: 86 MMHG | TEMPERATURE: 99.1 F

## 2023-06-02 DIAGNOSIS — Z02.89 MEDICATION MANAGEMENT CONTRACT AGREEMENT: Primary | ICD-10-CM

## 2023-06-02 DIAGNOSIS — F90.2 ATTENTION DEFICIT HYPERACTIVITY DISORDER (ADHD), COMBINED TYPE: ICD-10-CM

## 2023-06-02 LAB — ANNOTATION COMMENT IMP: NORMAL

## 2023-06-02 RX ORDER — FREMANEZUMAB-VFRM 225 MG/1.5ML
225 INJECTION SUBCUTANEOUS
COMMUNITY
Start: 2023-04-10

## 2023-06-02 ASSESSMENT — ENCOUNTER SYMPTOMS
SHORTNESS OF BREATH: 0
EYE PAIN: 0
SINUS PRESSURE: 0
SORE THROAT: 0
ABDOMINAL PAIN: 0
RHINORRHEA: 0
COUGH: 0
EYES NEGATIVE: 1
EYE ITCHING: 0
WHEEZING: 0

## 2023-06-02 NOTE — PROGRESS NOTES
Gisel Alfonso (:  1995) is a 25 y.o. female,Established patient, here for evaluation of the following chief complaint(s):  Medication Check          Subjective   SUBJECTIVE/OBJECTIVE:  HPI  56-year-old female patient here to transfer her psych care. Patient has been seeing a psychiatrist for ADHD medication and has been on Ritalin 20 mg twice daily recently. Patient states she had an MRI done recently at the hospital had a panic attack therefore did not take her medication for 5 days and when she went for her psych appointment they did a random urine drug screen and did not find any medicine in her her urine drug screen and hence they gave her 14-day supply and asked her to transfer care to PCP. Patient states she is very faithful about taking her medication and this was only time she missed, she is doing well on Ritalin 20 mg twice daily. Review of Systems   Constitutional: Negative. Negative for fatigue. HENT:  Negative for congestion, ear pain, rhinorrhea, sinus pressure and sore throat. Eyes: Negative. Negative for pain and itching. Respiratory:  Negative for cough, shortness of breath and wheezing. Cardiovascular:  Negative for chest pain and palpitations. Gastrointestinal:  Negative for abdominal pain. Genitourinary:  Negative for frequency and urgency. Musculoskeletal:  Negative for gait problem. Skin:  Negative for rash. Neurological:  Negative for dizziness and headaches. Psychiatric/Behavioral:  The patient is not nervous/anxious. Objective   Physical Exam  Constitutional:       Appearance: Normal appearance. HENT:      Head: Normocephalic and atraumatic. Right Ear: Tympanic membrane, ear canal and external ear normal.      Left Ear: Tympanic membrane, ear canal and external ear normal.      Nose: Nose normal. No congestion or rhinorrhea. Mouth/Throat:      Mouth: Mucous membranes are moist.      Pharynx: Oropharynx is clear.  No oropharyngeal

## 2023-06-07 LAB
6MAM UR QL: NOT DETECTED
7AMINOCLONAZEPAM UR QL: NOT DETECTED
A-OH ALPRAZ UR QL: NOT DETECTED
ALPHA-OH-MIDAZOLAM, URINE: NOT DETECTED
ALPRAZ UR QL: NOT DETECTED
AMPHET UR QL SCN: NOT DETECTED
ANNOTATION COMMENT IMP: NORMAL
ANNOTATION COMMENT IMP: NORMAL
BARBITURATES UR QL: NOT DETECTED
BUPRENORPHINE UR QL: NOT DETECTED
BZE UR QL: NOT DETECTED
CARBOXYTHC UR QL: NOT DETECTED
CARISOPRODOL UR QL: NOT DETECTED
CLONAZEPAM UR QL: NOT DETECTED
CODEINE UR QL: NOT DETECTED
CREAT UR-MCNC: 287.4 MG/DL (ref 20–400)
DIAZEPAM UR QL: NOT DETECTED
ETHYL GLUCURONIDE UR QL: NOT DETECTED
FENTANYL UR QL: NOT DETECTED
GABAPENTIN: NOT DETECTED
HYDROCODONE UR QL: NOT DETECTED
HYDROMORPHONE UR QL: NOT DETECTED
LORAZEPAM UR QL: NOT DETECTED
MDA UR QL: NOT DETECTED
MDEA UR QL: NOT DETECTED
MDMA UR QL: NOT DETECTED
MEPERIDINE UR QL: NOT DETECTED
METHADONE UR QL: NOT DETECTED
METHAMPHET UR QL: NOT DETECTED
MIDAZOLAM UR QL SCN: NOT DETECTED
MORPHINE UR QL: NOT DETECTED
NALOXONE: NOT DETECTED
NORBUPRENORPHINE UR QL CFM: NOT DETECTED
NORDIAZEPAM UR QL: NOT DETECTED
NORFENTANYL UR QL: NOT DETECTED
NORHYDROCODONE UR QL CFM: NOT DETECTED
NOROXYCODONE UR QL CFM: NOT DETECTED
NOROXYMORPHONE, URINE: NOT DETECTED
OXAZEPAM UR QL: NOT DETECTED
OXYCODONE UR QL: NOT DETECTED
OXYMORPHONE UR QL: NOT DETECTED
PATHOLOGY STUDY: NORMAL
PCP UR QL: NOT DETECTED
PHENTERMINE UR QL: NOT DETECTED
PPAA UR QL: PRESENT
PREGABALIN: NOT DETECTED
SERVICE CMNT-IMP: NORMAL
TAPENTADOL UR QL SCN: NOT DETECTED
TAPENTADOL-O-SULFATE, URINE: NOT DETECTED
TEMAZEPAM UR QL: NOT DETECTED
TRAMADOL UR QL: NOT DETECTED
ZOLPIDEM UR QL: NOT DETECTED

## 2023-06-29 ENCOUNTER — TELEPHONE (OUTPATIENT)
Dept: FAMILY MEDICINE CLINIC | Age: 28
End: 2023-06-29

## 2023-06-29 DIAGNOSIS — F90.2 ATTENTION DEFICIT HYPERACTIVITY DISORDER (ADHD), COMBINED TYPE: Primary | ICD-10-CM

## 2023-06-29 RX ORDER — METHYLPHENIDATE HYDROCHLORIDE 20 MG/1
20 TABLET ORAL 2 TIMES DAILY
Qty: 60 TABLET | Refills: 0 | Status: SHIPPED | OUTPATIENT
Start: 2023-06-29 | End: 2023-07-29

## 2023-07-11 ENCOUNTER — OFFICE VISIT (OUTPATIENT)
Dept: FAMILY MEDICINE CLINIC | Age: 28
End: 2023-07-11
Payer: COMMERCIAL

## 2023-07-11 VITALS
DIASTOLIC BLOOD PRESSURE: 78 MMHG | HEIGHT: 60 IN | HEART RATE: 109 BPM | BODY MASS INDEX: 38.64 KG/M2 | OXYGEN SATURATION: 99 % | TEMPERATURE: 98 F | WEIGHT: 196.8 LBS | SYSTOLIC BLOOD PRESSURE: 120 MMHG

## 2023-07-11 DIAGNOSIS — F90.2 ATTENTION DEFICIT HYPERACTIVITY DISORDER (ADHD), COMBINED TYPE: Primary | ICD-10-CM

## 2023-07-11 PROCEDURE — 1036F TOBACCO NON-USER: CPT | Performed by: FAMILY MEDICINE

## 2023-07-11 PROCEDURE — 99213 OFFICE O/P EST LOW 20 MIN: CPT | Performed by: FAMILY MEDICINE

## 2023-07-11 PROCEDURE — G8427 DOCREV CUR MEDS BY ELIG CLIN: HCPCS | Performed by: FAMILY MEDICINE

## 2023-07-11 PROCEDURE — G8417 CALC BMI ABV UP PARAM F/U: HCPCS | Performed by: FAMILY MEDICINE

## 2023-07-11 ASSESSMENT — ENCOUNTER SYMPTOMS
COUGH: 0
SINUS PRESSURE: 0
EYES NEGATIVE: 1
ABDOMINAL PAIN: 0
SORE THROAT: 0
EYE PAIN: 0
WHEEZING: 0
SHORTNESS OF BREATH: 0
EYE ITCHING: 0
RHINORRHEA: 0

## 2023-07-11 NOTE — PROGRESS NOTES
Mallie Simmonds (:  1995) is a 25 y.o. female,Established patient, here for evaluation of the following chief complaint(s):  Medication Adjustment          Subjective   SUBJECTIVE/OBJECTIVE:  HPI  78-year-old female patient here for ADHD follow-up. She is on Ritalin 20 mg twice daily and is doing well. No complaints. She is currently seeing neurologist for the headaches. Her blood pressure is stable, she still has 2 weeks worth of rectal and left    Review of Systems   Constitutional: Negative. Negative for fatigue. HENT:  Negative for congestion, ear pain, rhinorrhea, sinus pressure and sore throat. Eyes: Negative. Negative for pain and itching. Respiratory:  Negative for cough, shortness of breath and wheezing. Cardiovascular:  Negative for chest pain and palpitations. Gastrointestinal:  Negative for abdominal pain. Genitourinary:  Negative for frequency and urgency. Musculoskeletal:  Negative for gait problem. Skin:  Negative for rash. Neurological:  Negative for dizziness and headaches. Psychiatric/Behavioral:  The patient is not nervous/anxious. Objective   Physical Exam  Constitutional:       Appearance: Normal appearance. HENT:      Head: Normocephalic and atraumatic. Right Ear: Tympanic membrane, ear canal and external ear normal.      Left Ear: Tympanic membrane, ear canal and external ear normal.      Nose: Nose normal. No congestion or rhinorrhea. Mouth/Throat:      Mouth: Mucous membranes are moist.      Pharynx: Oropharynx is clear. No oropharyngeal exudate or posterior oropharyngeal erythema. Eyes:      Extraocular Movements: Extraocular movements intact. Conjunctiva/sclera: Conjunctivae normal.      Pupils: Pupils are equal, round, and reactive to light. Neck:      Vascular: No carotid bruit. Cardiovascular:      Rate and Rhythm: Normal rate and regular rhythm. Pulses: Normal pulses.       Heart sounds: Normal heart sounds, S1

## 2023-07-25 DIAGNOSIS — J33.9 NASAL POLYPOSIS: ICD-10-CM

## 2023-07-25 RX ORDER — FLUTICASONE PROPIONATE 50 MCG
SPRAY, SUSPENSION (ML) NASAL
Qty: 1 EACH | Refills: 7 | Status: SHIPPED | OUTPATIENT
Start: 2023-07-25

## 2023-07-26 DIAGNOSIS — F90.2 ATTENTION DEFICIT HYPERACTIVITY DISORDER (ADHD), COMBINED TYPE: ICD-10-CM

## 2023-07-26 RX ORDER — METHYLPHENIDATE HYDROCHLORIDE 20 MG/1
20 TABLET ORAL 2 TIMES DAILY
Qty: 60 TABLET | Refills: 0 | Status: SHIPPED | OUTPATIENT
Start: 2023-07-29 | End: 2023-08-01 | Stop reason: SDUPTHER

## 2023-07-26 NOTE — TELEPHONE ENCOUNTER
Medication:   Requested Prescriptions     Pending Prescriptions Disp Refills    methylphenidate (RITALIN) 20 MG tablet 60 tablet 0     Sig: Take 1 tablet by mouth 2 times daily for 30 days. Max Daily Amount: 40 mg        Last Filled:      Patient Phone Number: [phone] (home)     Last appt: 7/11/2023   Next appt: 10/26/2023    Last OARRS: No flowsheet data found.

## 2023-07-27 ENCOUNTER — PATIENT MESSAGE (OUTPATIENT)
Dept: FAMILY MEDICINE CLINIC | Age: 28
End: 2023-07-27

## 2023-08-01 DIAGNOSIS — F90.2 ATTENTION DEFICIT HYPERACTIVITY DISORDER (ADHD), COMBINED TYPE: ICD-10-CM

## 2023-08-01 RX ORDER — METHYLPHENIDATE HYDROCHLORIDE 20 MG/1
20 TABLET ORAL 2 TIMES DAILY
Qty: 60 TABLET | Refills: 0 | Status: SHIPPED | OUTPATIENT
Start: 2023-08-01 | End: 2023-08-31

## 2023-08-23 DIAGNOSIS — F90.2 ATTENTION DEFICIT HYPERACTIVITY DISORDER (ADHD), COMBINED TYPE: ICD-10-CM

## 2023-08-23 RX ORDER — METHYLPHENIDATE HYDROCHLORIDE 20 MG/1
20 TABLET ORAL 2 TIMES DAILY
Qty: 60 TABLET | Refills: 0 | Status: SHIPPED | OUTPATIENT
Start: 2023-08-23 | End: 2023-09-22

## 2023-08-23 NOTE — TELEPHONE ENCOUNTER
Medication:   Requested Prescriptions     Pending Prescriptions Disp Refills    methylphenidate (RITALIN) 20 MG tablet 60 tablet 0     Sig: Take 1 tablet by mouth 2 times daily for 30 days. Max Daily Amount: 40 mg        Last Filled:      Patient Phone Number: [phone] (home)     Last appt: 7/11/2023   Next appt: 10/26/2023    Last OARRS: No flowsheet data found.

## 2023-09-24 DIAGNOSIS — F90.2 ATTENTION DEFICIT HYPERACTIVITY DISORDER (ADHD), COMBINED TYPE: ICD-10-CM

## 2023-09-25 RX ORDER — METHYLPHENIDATE HYDROCHLORIDE 20 MG/1
20 TABLET ORAL 2 TIMES DAILY
Qty: 60 TABLET | Refills: 0 | Status: SHIPPED | OUTPATIENT
Start: 2023-09-25 | End: 2023-10-24 | Stop reason: SDUPTHER

## 2023-09-25 NOTE — TELEPHONE ENCOUNTER
Medication:   Requested Prescriptions     Pending Prescriptions Disp Refills    methylphenidate (RITALIN) 20 MG tablet 60 tablet 0     Sig: Take 1 tablet by mouth 2 times daily for 30 days.  Due 8/28/23 Max Daily Amount: 40 mg        Last Filled:      Patient Phone Number: [phone] (home)     Last appt: 7/11/2023   Next appt: 10/26/2023    Last OARRS:        No data to display

## 2023-09-28 ENCOUNTER — TELEMEDICINE (OUTPATIENT)
Dept: FAMILY MEDICINE CLINIC | Age: 28
End: 2023-09-28
Payer: COMMERCIAL

## 2023-09-28 DIAGNOSIS — R50.9 FEVER, UNSPECIFIED FEVER CAUSE: ICD-10-CM

## 2023-09-28 DIAGNOSIS — J02.9 SORE THROAT: Primary | ICD-10-CM

## 2023-09-28 PROCEDURE — G8428 CUR MEDS NOT DOCUMENT: HCPCS | Performed by: FAMILY MEDICINE

## 2023-09-28 PROCEDURE — 99213 OFFICE O/P EST LOW 20 MIN: CPT | Performed by: FAMILY MEDICINE

## 2023-09-28 ASSESSMENT — ENCOUNTER SYMPTOMS
EYE ITCHING: 0
COUGH: 0
WHEEZING: 0
ABDOMINAL PAIN: 0
SHORTNESS OF BREATH: 0
EYES NEGATIVE: 1
SINUS PRESSURE: 0
EYE PAIN: 0
SORE THROAT: 1
RHINORRHEA: 0

## 2023-09-28 NOTE — PROGRESS NOTES
Fred Christianson (:  1995) is a Established patient, here for evaluation of the following:    Objective   Patient-Reported Vitals  No data recorded     Physical Exam    Constitutional: [x] Appears well-developed and well-nourished [x] No apparent distress      [] Abnormal -     Mental status: [x] Alert and awake  [x] Oriented to person/place/time [x] Able to follow commands    [] Abnormal -            Psychiatric:       [x] Normal Affect [] Abnormal -        [x] No Hallucinations             Assessment & Plan   Below is the assessment and plan developed based on review of pertinent history, physical exam, labs, studies, and medications. 1. Sore throat-patient advised will need to run flu swab, strep swab to determine the etiology of the sickness. Patient states she will wait out 2 to 3 days to see if it is viral and resolve on its own and will return to clinic if the symptoms get worse. She is requiring a note off work for today and tomorrow. 2. Fever, unspecified fever cause    Return if symptoms worsen or fail to improve. Subjective   HPI  75-year-old female patient seen via virtual visit for sick symptoms. Patient says she started out yesterday morning with chills, low-grade fever, lightheadedness and fatigue and body aches. Through the evening she started out with sore throat and a fever of 101, she did a home COVID test at 2 AM this morning which was negative. She works in a  around [employer]. She this is her second week of the job. This morning she has a 101 fever and she feels body aches. She denies cough, congestion, shortness of breath. Review of Systems   Constitutional:  Positive for chills, fatigue and fever. HENT:  Positive for sore throat. Negative for congestion, ear pain, rhinorrhea and sinus pressure. Eyes: Negative. Negative for pain and itching. Respiratory:  Negative for cough, shortness of breath and wheezing.     Cardiovascular:  Negative for chest pain

## 2023-09-29 ENCOUNTER — APPOINTMENT (OUTPATIENT)
Dept: MRI IMAGING | Age: 28
End: 2023-09-29
Payer: COMMERCIAL

## 2023-09-29 ENCOUNTER — HOSPITAL ENCOUNTER (EMERGENCY)
Age: 28
Discharge: HOME | End: 2023-09-29
Attending: EMERGENCY MEDICINE
Payer: COMMERCIAL

## 2023-09-29 VITALS
HEIGHT: 60 IN | TEMPERATURE: 98.6 F | RESPIRATION RATE: 18 BRPM | DIASTOLIC BLOOD PRESSURE: 66 MMHG | OXYGEN SATURATION: 96 % | BODY MASS INDEX: 38.56 KG/M2 | SYSTOLIC BLOOD PRESSURE: 118 MMHG | HEART RATE: 80 BPM | WEIGHT: 196.4 LBS

## 2023-09-29 DIAGNOSIS — M54.50 ACUTE RIGHT-SIDED LOW BACK PAIN WITHOUT SCIATICA: Primary | ICD-10-CM

## 2023-09-29 LAB
BACTERIA URNS QL MICRO: ABNORMAL /HPF
BILIRUB UR QL STRIP.AUTO: ABNORMAL
CLARITY UR: CLEAR
COLOR UR: YELLOW
EPI CELLS #/AREA URNS HPF: ABNORMAL /HPF (ref 0–5)
GLUCOSE UR STRIP.AUTO-MCNC: NEGATIVE MG/DL
HGB UR QL STRIP.AUTO: NEGATIVE
KETONES UR STRIP.AUTO-MCNC: 15 MG/DL
LEUKOCYTE ESTERASE UR QL STRIP.AUTO: NEGATIVE
MUCOUS THREADS #/AREA URNS LPF: ABNORMAL /LPF
NITRITE UR QL STRIP.AUTO: NEGATIVE
PH UR STRIP.AUTO: 5.5 [PH] (ref 5–8)
PROT UR STRIP.AUTO-MCNC: ABNORMAL MG/DL
RBC #/AREA URNS HPF: ABNORMAL /HPF (ref 0–4)
SP GR UR STRIP.AUTO: >=1.03 (ref 1–1.03)
UA COMPLETE W REFLEX CULTURE PNL UR: ABNORMAL
UA DIPSTICK W REFLEX MICRO PNL UR: YES
URN SPEC COLLECT METH UR: ABNORMAL
UROBILINOGEN UR STRIP-ACNC: 0.2 E.U./DL
WBC #/AREA URNS HPF: ABNORMAL /HPF (ref 0–5)

## 2023-09-29 PROCEDURE — 96365 THER/PROPH/DIAG IV INF INIT: CPT

## 2023-09-29 PROCEDURE — 6370000000 HC RX 637 (ALT 250 FOR IP): Performed by: EMERGENCY MEDICINE

## 2023-09-29 PROCEDURE — 6360000002 HC RX W HCPCS: Performed by: EMERGENCY MEDICINE

## 2023-09-29 PROCEDURE — 99284 EMERGENCY DEPT VISIT MOD MDM: CPT

## 2023-09-29 PROCEDURE — 72148 MRI LUMBAR SPINE W/O DYE: CPT

## 2023-09-29 PROCEDURE — 81001 URINALYSIS AUTO W/SCOPE: CPT

## 2023-09-29 PROCEDURE — 96372 THER/PROPH/DIAG INJ SC/IM: CPT

## 2023-09-29 PROCEDURE — 2580000003 HC RX 258: Performed by: EMERGENCY MEDICINE

## 2023-09-29 RX ORDER — NAPROXEN 500 MG/1
500 TABLET ORAL 2 TIMES DAILY WITH MEALS
Qty: 30 TABLET | Refills: 0 | Status: SHIPPED | OUTPATIENT
Start: 2023-09-29 | End: 2023-10-14

## 2023-09-29 RX ORDER — METHOCARBAMOL 750 MG/1
750 TABLET, FILM COATED ORAL 4 TIMES DAILY
Qty: 40 TABLET | Refills: 0 | Status: SHIPPED | OUTPATIENT
Start: 2023-09-29 | End: 2023-10-09

## 2023-09-29 RX ORDER — DIAZEPAM 5 MG/1
5 TABLET ORAL ONCE
Status: COMPLETED | OUTPATIENT
Start: 2023-09-29 | End: 2023-09-29

## 2023-09-29 RX ORDER — KETOROLAC TROMETHAMINE 30 MG/ML
15 INJECTION, SOLUTION INTRAMUSCULAR; INTRAVENOUS ONCE
Status: COMPLETED | OUTPATIENT
Start: 2023-09-29 | End: 2023-09-29

## 2023-09-29 RX ORDER — HYDROMORPHONE HYDROCHLORIDE 1 MG/ML
1 INJECTION, SOLUTION INTRAMUSCULAR; INTRAVENOUS; SUBCUTANEOUS ONCE
Status: COMPLETED | OUTPATIENT
Start: 2023-09-29 | End: 2023-09-29

## 2023-09-29 RX ADMIN — METHOCARBAMOL 1000 MG: 100 INJECTION INTRAMUSCULAR; INTRAVENOUS at 11:34

## 2023-09-29 RX ADMIN — DIAZEPAM 5 MG: 5 TABLET ORAL at 08:07

## 2023-09-29 RX ADMIN — HYDROMORPHONE HYDROCHLORIDE 1 MG: 1 INJECTION, SOLUTION INTRAMUSCULAR; INTRAVENOUS; SUBCUTANEOUS at 09:25

## 2023-09-29 RX ADMIN — KETOROLAC TROMETHAMINE 15 MG: 30 INJECTION, SOLUTION INTRAMUSCULAR; INTRAVENOUS at 08:07

## 2023-09-29 ASSESSMENT — PAIN SCALES - GENERAL
PAINLEVEL_OUTOF10: 10
PAINLEVEL_OUTOF10: 9

## 2023-09-29 ASSESSMENT — PAIN DESCRIPTION - DESCRIPTORS: DESCRIPTORS: SPASM

## 2023-09-29 ASSESSMENT — PAIN DESCRIPTION - ONSET: ONSET: PROGRESSIVE

## 2023-09-29 ASSESSMENT — PAIN DESCRIPTION - LOCATION
LOCATION: BACK
LOCATION: BACK

## 2023-09-29 ASSESSMENT — PAIN DESCRIPTION - PAIN TYPE: TYPE: ACUTE PAIN

## 2023-09-29 ASSESSMENT — PAIN - FUNCTIONAL ASSESSMENT: PAIN_FUNCTIONAL_ASSESSMENT: 0-10

## 2023-09-29 ASSESSMENT — PAIN DESCRIPTION - ORIENTATION: ORIENTATION: MID

## 2023-10-18 ENCOUNTER — OFFICE VISIT (OUTPATIENT)
Dept: FAMILY MEDICINE CLINIC | Age: 28
End: 2023-10-18
Payer: COMMERCIAL

## 2023-10-18 VITALS
TEMPERATURE: 97.3 F | WEIGHT: 191.4 LBS | DIASTOLIC BLOOD PRESSURE: 80 MMHG | OXYGEN SATURATION: 98 % | HEART RATE: 97 BPM | SYSTOLIC BLOOD PRESSURE: 122 MMHG | HEIGHT: 60 IN | BODY MASS INDEX: 37.58 KG/M2

## 2023-10-18 DIAGNOSIS — J06.9 VIRAL URI: Primary | ICD-10-CM

## 2023-10-18 PROBLEM — F11.23 OPIOID DEPENDENCE WITH WITHDRAWAL (HCC): Status: RESOLVED | Noted: 2017-02-02 | Resolved: 2023-10-18

## 2023-10-18 PROCEDURE — G8484 FLU IMMUNIZE NO ADMIN: HCPCS | Performed by: FAMILY MEDICINE

## 2023-10-18 PROCEDURE — 1036F TOBACCO NON-USER: CPT | Performed by: FAMILY MEDICINE

## 2023-10-18 PROCEDURE — G8417 CALC BMI ABV UP PARAM F/U: HCPCS | Performed by: FAMILY MEDICINE

## 2023-10-18 PROCEDURE — 99213 OFFICE O/P EST LOW 20 MIN: CPT | Performed by: FAMILY MEDICINE

## 2023-10-18 PROCEDURE — G8427 DOCREV CUR MEDS BY ELIG CLIN: HCPCS | Performed by: FAMILY MEDICINE

## 2023-10-18 RX ORDER — BROMPHENIRAMINE MALEATE, PSEUDOEPHEDRINE HYDROCHLORIDE, AND DEXTROMETHORPHAN HYDROBROMIDE 2; 30; 10 MG/5ML; MG/5ML; MG/5ML
5 SYRUP ORAL 3 TIMES DAILY PRN
Qty: 105 ML | Refills: 0 | Status: SHIPPED | OUTPATIENT
Start: 2023-10-18

## 2023-10-18 ASSESSMENT — ENCOUNTER SYMPTOMS
WHEEZING: 0
SORE THROAT: 0
SINUS PRESSURE: 0
SHORTNESS OF BREATH: 0
EYE PAIN: 0
EYES NEGATIVE: 1
COUGH: 1
EYE ITCHING: 0
ABDOMINAL PAIN: 0
RHINORRHEA: 0

## 2023-10-18 NOTE — PROGRESS NOTES
Jammie Lundborg (:  1995) is a 25 y.o. female,Established patient, here for evaluation of the following chief complaint(s):  Otalgia          Subjective   SUBJECTIVE/OBJECTIVE:  HPI  25 yr old female here for acute visit. Pt complains of b/l ear congestion and unable to hear properly. She has cough for last 7 days, she describes the cough as productive. No fever, sore throat , no headaches or body aches. Review of Systems   Constitutional: Negative. Negative for fatigue. HENT:  Positive for congestion. Negative for ear pain, rhinorrhea, sinus pressure and sore throat. Eyes: Negative. Negative for pain and itching. Respiratory:  Positive for cough. Negative for shortness of breath and wheezing. Cardiovascular:  Negative for chest pain and palpitations. Gastrointestinal:  Negative for abdominal pain. Genitourinary:  Negative for frequency and urgency. Musculoskeletal:  Negative for gait problem. Skin:  Negative for rash. Neurological:  Negative for dizziness and headaches. Psychiatric/Behavioral:  The patient is not nervous/anxious. Objective   Physical Exam  Constitutional:       Appearance: Normal appearance. HENT:      Head: Normocephalic and atraumatic. Right Ear: External ear normal.      Left Ear: External ear normal.      Ears:      Comments: B/l ear congested - fluid filled. Nose: Nose normal. No congestion or rhinorrhea. Mouth/Throat:      Mouth: Mucous membranes are moist.      Pharynx: Oropharynx is clear. No oropharyngeal exudate or posterior oropharyngeal erythema. Eyes:      Extraocular Movements: Extraocular movements intact. Conjunctiva/sclera: Conjunctivae normal.      Pupils: Pupils are equal, round, and reactive to light. Neck:      Vascular: No carotid bruit. Cardiovascular:      Rate and Rhythm: Normal rate and regular rhythm. Pulses: Normal pulses.       Heart sounds: Normal heart sounds, S1 normal and S2 normal. No

## 2023-10-24 ENCOUNTER — OFFICE VISIT (OUTPATIENT)
Dept: FAMILY MEDICINE CLINIC | Age: 28
End: 2023-10-24
Payer: COMMERCIAL

## 2023-10-24 VITALS
OXYGEN SATURATION: 98 % | BODY MASS INDEX: 38.07 KG/M2 | WEIGHT: 193.9 LBS | DIASTOLIC BLOOD PRESSURE: 82 MMHG | HEART RATE: 115 BPM | SYSTOLIC BLOOD PRESSURE: 122 MMHG | HEIGHT: 60 IN | TEMPERATURE: 97.4 F

## 2023-10-24 DIAGNOSIS — F90.2 ATTENTION DEFICIT HYPERACTIVITY DISORDER (ADHD), COMBINED TYPE: Primary | ICD-10-CM

## 2023-10-24 DIAGNOSIS — J01.10 ACUTE NON-RECURRENT FRONTAL SINUSITIS: ICD-10-CM

## 2023-10-24 PROCEDURE — G8427 DOCREV CUR MEDS BY ELIG CLIN: HCPCS | Performed by: FAMILY MEDICINE

## 2023-10-24 PROCEDURE — 1036F TOBACCO NON-USER: CPT | Performed by: FAMILY MEDICINE

## 2023-10-24 PROCEDURE — G8417 CALC BMI ABV UP PARAM F/U: HCPCS | Performed by: FAMILY MEDICINE

## 2023-10-24 PROCEDURE — G8484 FLU IMMUNIZE NO ADMIN: HCPCS | Performed by: FAMILY MEDICINE

## 2023-10-24 PROCEDURE — 99213 OFFICE O/P EST LOW 20 MIN: CPT | Performed by: FAMILY MEDICINE

## 2023-10-24 RX ORDER — ALBUTEROL SULFATE 90 UG/1
2 AEROSOL, METERED RESPIRATORY (INHALATION) 4 TIMES DAILY PRN
Qty: 18 G | Refills: 0 | Status: SHIPPED | OUTPATIENT
Start: 2023-10-24

## 2023-10-24 RX ORDER — AMOXICILLIN 500 MG/1
500 CAPSULE ORAL 2 TIMES DAILY
Qty: 14 CAPSULE | Refills: 0 | Status: SHIPPED | OUTPATIENT
Start: 2023-10-24 | End: 2023-10-31

## 2023-10-24 RX ORDER — METHYLPHENIDATE HYDROCHLORIDE 20 MG/1
20 TABLET ORAL 2 TIMES DAILY
Qty: 60 TABLET | Refills: 0 | Status: SHIPPED | OUTPATIENT
Start: 2023-10-24 | End: 2023-11-23

## 2023-10-25 ASSESSMENT — ENCOUNTER SYMPTOMS
SINUS PRESSURE: 1
EYE ITCHING: 0
ABDOMINAL PAIN: 0
EYE PAIN: 0
EYES NEGATIVE: 1
COUGH: 1
SHORTNESS OF BREATH: 0
RHINORRHEA: 0
WHEEZING: 0
SORE THROAT: 0

## 2023-10-25 NOTE — PROGRESS NOTES
Mallie Simmonds (:  1995) is a 25 y.o. female,Established patient, here for evaluation of the following chief complaint(s):  Follow-up          Subjective   SUBJECTIVE/OBJECTIVE:  HPI  25 yr old female here for follow up. Pt states she has been sick for almost a month now. She works in  center and started with cough about a month ago. Fever resolved after few days but her cough and congestion has been lingering on since. She has tried OTC mucinex, bromfed DM but her chest still seems to be congested and her ears are plugged, she is requesting an inhaler as her chest congestion is causing wheezing at times. She is requesting refill of her ritalin for her ADHD. Review of Systems   Constitutional: Negative. Negative for fatigue. HENT:  Positive for congestion and sinus pressure. Negative for ear pain, rhinorrhea and sore throat. Eyes: Negative. Negative for pain and itching. Respiratory:  Positive for cough. Negative for shortness of breath and wheezing. Cardiovascular:  Negative for chest pain and palpitations. Gastrointestinal:  Negative for abdominal pain. Genitourinary:  Negative for frequency and urgency. Musculoskeletal:  Negative for gait problem. Skin:  Negative for rash. Neurological:  Negative for dizziness and headaches. Psychiatric/Behavioral:  The patient is not nervous/anxious. Objective   Physical Exam  Constitutional:       Appearance: Normal appearance. HENT:      Head: Normocephalic and atraumatic. Comments: B/l ear congested      Right Ear: Tympanic membrane, ear canal and external ear normal.      Left Ear: Tympanic membrane, ear canal and external ear normal.      Nose: Nose normal. No congestion or rhinorrhea. Mouth/Throat:      Mouth: Mucous membranes are moist.      Pharynx: Oropharynx is clear. No oropharyngeal exudate or posterior oropharyngeal erythema. Eyes:      Extraocular Movements: Extraocular movements intact.

## 2023-11-23 DIAGNOSIS — F90.2 ATTENTION DEFICIT HYPERACTIVITY DISORDER (ADHD), COMBINED TYPE: ICD-10-CM

## 2023-11-27 RX ORDER — METHYLPHENIDATE HYDROCHLORIDE 20 MG/1
20 TABLET ORAL 2 TIMES DAILY
Qty: 60 TABLET | Refills: 0 | Status: SHIPPED | OUTPATIENT
Start: 2023-11-27 | End: 2023-12-27

## 2023-11-27 NOTE — TELEPHONE ENCOUNTER
Medication:   Requested Prescriptions     Pending Prescriptions Disp Refills    methylphenidate (RITALIN) 20 MG tablet 60 tablet 0     Sig: Take 1 tablet by mouth 2 times daily for 30 days.  Max Daily Amount: 40 mg        Last Filled:      Patient Phone Number: [phone] (home)     Last appt: 10/24/2023   Next appt: 1/26/2024    Last OARRS:        No data to display

## 2023-12-01 ENCOUNTER — TELEPHONE (OUTPATIENT)
Dept: ENT CLINIC | Age: 28
End: 2023-12-01

## 2023-12-01 NOTE — TELEPHONE ENCOUNTER
Patient called to ask Ms. Liban Burleson some questions about how allergy testing for her 3 yr old son would work. Please give her a call back when possible, thanks!

## 2023-12-22 DIAGNOSIS — F90.2 ATTENTION DEFICIT HYPERACTIVITY DISORDER (ADHD), COMBINED TYPE: ICD-10-CM

## 2023-12-22 NOTE — TELEPHONE ENCOUNTER
Medication:   Requested Prescriptions     Pending Prescriptions Disp Refills    methylphenidate (RITALIN) 20 MG tablet 60 tablet 0     Sig: Take 1 tablet by mouth 2 times daily for 30 days.  Max Daily Amount: 40 mg        Last Filled:      Patient Phone Number: [phone] (home)     Last appt: 10/24/2023   Next appt: 1/26/2024    Last OARRS:        No data to display

## 2023-12-25 RX ORDER — METHYLPHENIDATE HYDROCHLORIDE 20 MG/1
20 TABLET ORAL 2 TIMES DAILY
Qty: 60 TABLET | Refills: 0 | Status: SHIPPED | OUTPATIENT
Start: 2023-12-25 | End: 2024-01-24

## 2024-01-11 ENCOUNTER — OFFICE VISIT (OUTPATIENT)
Dept: FAMILY MEDICINE CLINIC | Age: 29
End: 2024-01-11
Payer: COMMERCIAL

## 2024-01-11 VITALS
BODY MASS INDEX: 39.11 KG/M2 | OXYGEN SATURATION: 98 % | TEMPERATURE: 97.5 F | SYSTOLIC BLOOD PRESSURE: 119 MMHG | HEIGHT: 60 IN | HEART RATE: 71 BPM | WEIGHT: 199.2 LBS | DIASTOLIC BLOOD PRESSURE: 66 MMHG

## 2024-01-11 DIAGNOSIS — Z00.00 ANNUAL PHYSICAL EXAM: Primary | ICD-10-CM

## 2024-01-11 PROCEDURE — G8484 FLU IMMUNIZE NO ADMIN: HCPCS | Performed by: FAMILY MEDICINE

## 2024-01-11 PROCEDURE — 99395 PREV VISIT EST AGE 18-39: CPT | Performed by: FAMILY MEDICINE

## 2024-01-11 ASSESSMENT — ENCOUNTER SYMPTOMS
EYE PAIN: 0
ABDOMINAL PAIN: 0
RHINORRHEA: 0
EYES NEGATIVE: 1
SORE THROAT: 0
WHEEZING: 0
EYE ITCHING: 0
SINUS PRESSURE: 0
SHORTNESS OF BREATH: 0
COUGH: 0

## 2024-01-11 ASSESSMENT — PATIENT HEALTH QUESTIONNAIRE - PHQ9
SUM OF ALL RESPONSES TO PHQ QUESTIONS 1-9: 4
6. FEELING BAD ABOUT YOURSELF - OR THAT YOU ARE A FAILURE OR HAVE LET YOURSELF OR YOUR FAMILY DOWN: 0
2. FEELING DOWN, DEPRESSED OR HOPELESS: NOT AT ALL
4. FEELING TIRED OR HAVING LITTLE ENERGY: SEVERAL DAYS
SUM OF ALL RESPONSES TO PHQ QUESTIONS 1-9: 4
SUM OF ALL RESPONSES TO PHQ QUESTIONS 1-9: 4
5. POOR APPETITE OR OVEREATING: 1
7. TROUBLE CONCENTRATING ON THINGS, SUCH AS READING THE NEWSPAPER OR WATCHING TELEVISION: 1
10. IF YOU CHECKED OFF ANY PROBLEMS, HOW DIFFICULT HAVE THESE PROBLEMS MADE IT FOR YOU TO DO YOUR WORK, TAKE CARE OF THINGS AT HOME, OR GET ALONG WITH OTHER PEOPLE: SOMEWHAT DIFFICULT
SUM OF ALL RESPONSES TO PHQ QUESTIONS 1-9: 4
7. TROUBLE CONCENTRATING ON THINGS, SUCH AS READING THE NEWSPAPER OR WATCHING TELEVISION: SEVERAL DAYS
4. FEELING TIRED OR HAVING LITTLE ENERGY: 1
SUM OF ALL RESPONSES TO PHQ QUESTIONS 1-9: 4
8. MOVING OR SPEAKING SO SLOWLY THAT OTHER PEOPLE COULD HAVE NOTICED. OR THE OPPOSITE, BEING SO FIGETY OR RESTLESS THAT YOU HAVE BEEN MOVING AROUND A LOT MORE THAN USUAL: 0
6. FEELING BAD ABOUT YOURSELF - OR THAT YOU ARE A FAILURE OR HAVE LET YOURSELF OR YOUR FAMILY DOWN: NOT AT ALL
SUM OF ALL RESPONSES TO PHQ9 QUESTIONS 1 & 2: 1
1. LITTLE INTEREST OR PLEASURE IN DOING THINGS: 1
10. IF YOU CHECKED OFF ANY PROBLEMS, HOW DIFFICULT HAVE THESE PROBLEMS MADE IT FOR YOU TO DO YOUR WORK, TAKE CARE OF THINGS AT HOME, OR GET ALONG WITH OTHER PEOPLE: 1
3. TROUBLE FALLING OR STAYING ASLEEP: NOT AT ALL
2. FEELING DOWN, DEPRESSED OR HOPELESS: 0
8. MOVING OR SPEAKING SO SLOWLY THAT OTHER PEOPLE COULD HAVE NOTICED. OR THE OPPOSITE - BEING SO FIDGETY OR RESTLESS THAT YOU HAVE BEEN MOVING AROUND A LOT MORE THAN USUAL: NOT AT ALL
3. TROUBLE FALLING OR STAYING ASLEEP: 0
9. THOUGHTS THAT YOU WOULD BE BETTER OFF DEAD, OR OF HURTING YOURSELF: NOT AT ALL
1. LITTLE INTEREST OR PLEASURE IN DOING THINGS: SEVERAL DAYS
9. THOUGHTS THAT YOU WOULD BE BETTER OFF DEAD, OR OF HURTING YOURSELF: 0
5. POOR APPETITE OR OVEREATING: SEVERAL DAYS

## 2024-01-11 NOTE — PROGRESS NOTES
Sudha Cool is a 28 y.o. year old female here for:    Chief Complaint:    Chief Complaint   Patient presents with    H&P       Subjective:    Today, her current concerns include:    Physical for work  Preventive Services:  Pap up to date  Health Maintenance History:  Patient exercises regularly? no  Diet? ok  Glasses/Eyes: Does not wear prescription glasses or lenses      Other Health Maintenance History:  Sexually active?yes  In the past two weeks have they been bothered by feeling \"down\", depressed or hopeless?  no  In the past two weeks, have they experienced a loss of interest or pleasure in doing things?  no  In the last year, have you fallen more than once or been seriously injured in a fall?  no  Advance Directives no      Health Maintenance Screening:  Diabetes screening: was provided today  Cholesterol screening: was provided today      Functional Assessment  1.  Do you have problems with hearing?  no  2.  Do you have problems with vision?  no  Past Medical History:   Diagnosis Date    ADHD (attention deficit hyperactivity disorder)     Anxiety     Atypical migraine     Depression     Gestational diabetes     H/O drug abuse (HCC)     hx of heroin/cocaine    IBS (irritable bowel syndrome)     Levoscoliosis     Obese     PCOS (polycystic ovarian syndrome)      Social History     Tobacco Use    Smoking status: Never    Smokeless tobacco: Never   Vaping Use    Vaping Use: Never used   Substance Use Topics    Alcohol use: Not Currently    Drug use: Not Currently     Comment: heroin-snort 5 years ago     Family History   Problem Relation Age of Onset    Other Maternal Grandmother         demetia    Heart Disease Maternal Grandmother     High Cholesterol Maternal Grandfather     Diabetes Maternal Grandfather     High Blood Pressure Maternal Grandfather     Heart Disease Maternal Grandfather      Past Surgical History:   Procedure Laterality Date    COLONOSCOPY      ENDOSCOPY, COLON, DIAGNOSTIC

## 2024-01-23 ENCOUNTER — OFFICE VISIT (OUTPATIENT)
Dept: FAMILY MEDICINE CLINIC | Age: 29
End: 2024-01-23
Payer: COMMERCIAL

## 2024-01-23 VITALS
BODY MASS INDEX: 38.11 KG/M2 | HEIGHT: 60 IN | TEMPERATURE: 100 F | OXYGEN SATURATION: 97 % | SYSTOLIC BLOOD PRESSURE: 118 MMHG | WEIGHT: 194.1 LBS | HEART RATE: 99 BPM | RESPIRATION RATE: 16 BRPM | DIASTOLIC BLOOD PRESSURE: 80 MMHG

## 2024-01-23 DIAGNOSIS — F90.2 ATTENTION DEFICIT HYPERACTIVITY DISORDER (ADHD), COMBINED TYPE: ICD-10-CM

## 2024-01-23 DIAGNOSIS — J11.1 INFLUENZA: Primary | ICD-10-CM

## 2024-01-23 PROCEDURE — G8484 FLU IMMUNIZE NO ADMIN: HCPCS | Performed by: FAMILY MEDICINE

## 2024-01-23 PROCEDURE — 1036F TOBACCO NON-USER: CPT | Performed by: FAMILY MEDICINE

## 2024-01-23 PROCEDURE — G8417 CALC BMI ABV UP PARAM F/U: HCPCS | Performed by: FAMILY MEDICINE

## 2024-01-23 PROCEDURE — 99214 OFFICE O/P EST MOD 30 MIN: CPT | Performed by: FAMILY MEDICINE

## 2024-01-23 PROCEDURE — G8427 DOCREV CUR MEDS BY ELIG CLIN: HCPCS | Performed by: FAMILY MEDICINE

## 2024-01-23 RX ORDER — METHYLPHENIDATE HYDROCHLORIDE 20 MG/1
20 TABLET ORAL 2 TIMES DAILY
Qty: 60 TABLET | Refills: 0 | Status: SHIPPED | OUTPATIENT
Start: 2024-01-23 | End: 2024-02-22

## 2024-01-23 RX ORDER — METHYLPHENIDATE HYDROCHLORIDE 20 MG/1
20 TABLET ORAL 2 TIMES DAILY
Qty: 60 TABLET | Refills: 0 | Status: SHIPPED | OUTPATIENT
Start: 2024-03-23 | End: 2024-04-22

## 2024-01-23 RX ORDER — OSELTAMIVIR PHOSPHATE 75 MG/1
75 CAPSULE ORAL 2 TIMES DAILY
Qty: 10 CAPSULE | Refills: 0 | Status: SHIPPED | OUTPATIENT
Start: 2024-01-23 | End: 2024-01-28

## 2024-01-23 RX ORDER — METHYLPHENIDATE HYDROCHLORIDE 20 MG/1
20 TABLET ORAL 2 TIMES DAILY
Qty: 60 TABLET | Refills: 0 | Status: SHIPPED | OUTPATIENT
Start: 2024-02-22 | End: 2024-03-23

## 2024-01-23 ASSESSMENT — ENCOUNTER SYMPTOMS
EYE ITCHING: 0
RHINORRHEA: 1
COUGH: 1
EYES NEGATIVE: 1
SINUS PRESSURE: 0
EYE PAIN: 0
ABDOMINAL PAIN: 0
SHORTNESS OF BREATH: 0
SORE THROAT: 1
WHEEZING: 0

## 2024-01-30 ENCOUNTER — TELEMEDICINE (OUTPATIENT)
Dept: FAMILY MEDICINE CLINIC | Age: 29
End: 2024-01-30
Payer: COMMERCIAL

## 2024-01-30 DIAGNOSIS — F41.9 ANXIETY: Primary | ICD-10-CM

## 2024-01-30 PROCEDURE — G8428 CUR MEDS NOT DOCUMENT: HCPCS | Performed by: FAMILY MEDICINE

## 2024-01-30 PROCEDURE — 99213 OFFICE O/P EST LOW 20 MIN: CPT | Performed by: FAMILY MEDICINE

## 2024-01-30 RX ORDER — HYDROXYZINE PAMOATE 25 MG/1
25 CAPSULE ORAL EVERY 8 HOURS PRN
Qty: 30 CAPSULE | Refills: 0 | Status: SHIPPED | OUTPATIENT
Start: 2024-01-30 | End: 2024-02-13

## 2024-01-30 ASSESSMENT — ENCOUNTER SYMPTOMS
SORE THROAT: 0
EYE PAIN: 0
WHEEZING: 0
SHORTNESS OF BREATH: 0
COUGH: 0
EYE ITCHING: 0
EYES NEGATIVE: 1
ABDOMINAL PAIN: 0
SINUS PRESSURE: 0
RHINORRHEA: 0

## 2024-01-30 NOTE — PROGRESS NOTES
Sudha Cool (:  1995) is a Established patient, here for evaluation of the following:    Objective   Patient-Reported Vitals  No data recorded     Physical Exam    Constitutional: [x] Appears well-developed and well-nourished [x] No apparent distress      [] Abnormal -     Mental status: [x] Alert and awake  [x] Oriented to person/place/time [x] Able to follow commands    [] Abnormal -            Psychiatric:       [x] Normal Affect [] Abnormal -        [x] No Hallucinations           Assessment & Plan   Below is the assessment and plan developed based on review of pertinent history, physical exam, labs, studies, and medications.  1. Anxiety  -     hydrOXYzine pamoate (VISTARIL) 25 MG capsule; Take 1 capsule by mouth every 8 hours as needed for Anxiety, Disp-30 capsule, R-0Normal    F/u 2 weeks       Subjective   HPI  28 yr old female with h/o ADHD seen virtually to discuss anxiety. Pt states she has been having a lot of anxiety lately. She is starting a new job and also she has a neurovascular appt coming up.  She has been having silent painc attacks  She is already taking prozac 40 mg through her gyn   Review of Systems   Constitutional: Negative.  Negative for fatigue.   HENT:  Negative for congestion, ear pain, rhinorrhea, sinus pressure and sore throat.    Eyes: Negative.  Negative for pain and itching.   Respiratory:  Negative for cough, shortness of breath and wheezing.    Cardiovascular:  Negative for chest pain and palpitations.   Gastrointestinal:  Negative for abdominal pain.   Genitourinary:  Negative for frequency and urgency.   Musculoskeletal:  Negative for gait problem.   Skin:  Negative for rash.   Neurological:  Negative for dizziness and headaches.   Psychiatric/Behavioral:  The patient is not nervous/anxious.                   Sudha Cool, was evaluated through a synchronous (real-time) audio-video encounter. The patient (or guardian if applicable) is aware that this is a billable

## 2024-02-20 ENCOUNTER — OFFICE VISIT (OUTPATIENT)
Dept: FAMILY MEDICINE CLINIC | Age: 29
End: 2024-02-20
Payer: COMMERCIAL

## 2024-02-20 VITALS
SYSTOLIC BLOOD PRESSURE: 120 MMHG | HEIGHT: 60 IN | RESPIRATION RATE: 16 BRPM | BODY MASS INDEX: 37.99 KG/M2 | TEMPERATURE: 97.7 F | WEIGHT: 193.5 LBS | OXYGEN SATURATION: 98 % | HEART RATE: 63 BPM | DIASTOLIC BLOOD PRESSURE: 80 MMHG

## 2024-02-20 DIAGNOSIS — Z02.89 ENCOUNTER FOR PHYSICAL EXAMINATION RELATED TO EMPLOYMENT: Primary | ICD-10-CM

## 2024-02-20 PROCEDURE — 99213 OFFICE O/P EST LOW 20 MIN: CPT | Performed by: FAMILY MEDICINE

## 2024-02-20 RX ORDER — FLUOXETINE 10 MG/1
10 CAPSULE ORAL DAILY
COMMUNITY

## 2024-02-20 SDOH — ECONOMIC STABILITY: TRANSPORTATION INSECURITY
IN THE PAST 12 MONTHS, HAS LACK OF TRANSPORTATION KEPT YOU FROM MEETINGS, WORK, OR FROM GETTING THINGS NEEDED FOR DAILY LIVING?: PATIENT DECLINED

## 2024-02-20 SDOH — ECONOMIC STABILITY: FOOD INSECURITY: WITHIN THE PAST 12 MONTHS, THE FOOD YOU BOUGHT JUST DIDN'T LAST AND YOU DIDN'T HAVE MONEY TO GET MORE.: NEVER TRUE

## 2024-02-20 SDOH — ECONOMIC STABILITY: FOOD INSECURITY: WITHIN THE PAST 12 MONTHS, YOU WORRIED THAT YOUR FOOD WOULD RUN OUT BEFORE YOU GOT MONEY TO BUY MORE.: SOMETIMES TRUE

## 2024-02-20 SDOH — ECONOMIC STABILITY: INCOME INSECURITY: HOW HARD IS IT FOR YOU TO PAY FOR THE VERY BASICS LIKE FOOD, HOUSING, MEDICAL CARE, AND HEATING?: NOT VERY HARD

## 2024-02-21 ASSESSMENT — ENCOUNTER SYMPTOMS
WHEEZING: 0
EYE ITCHING: 0
ABDOMINAL PAIN: 0
EYES NEGATIVE: 1
SINUS PRESSURE: 0
SORE THROAT: 0
SHORTNESS OF BREATH: 0
COUGH: 0
EYE PAIN: 0
RHINORRHEA: 0

## 2024-02-21 NOTE — PROGRESS NOTES
Effort: Pulmonary effort is normal. No respiratory distress.      Breath sounds: Normal breath sounds. No wheezing, rhonchi or rales.   Abdominal:      General: Bowel sounds are normal. There is no distension.      Palpations: Abdomen is soft.      Tenderness: There is no abdominal tenderness. There is no right CVA tenderness, left CVA tenderness, guarding or rebound.   Musculoskeletal:         General: No swelling or tenderness. Normal range of motion.      Cervical back: Normal range of motion and neck supple. No rigidity or tenderness.      Right lower leg: No edema.      Left lower leg: No edema.   Lymphadenopathy:      Cervical: No cervical adenopathy.   Skin:     General: Skin is warm and dry.      Findings: No bruising or erythema.   Neurological:      General: No focal deficit present.      Mental Status: She is alert and oriented to person, place, and time.   Psychiatric:         Mood and Affect: Mood normal.         Behavior: Behavior normal.                     ASSESSMENT/PLAN:  1. Encounter for physical examination related to employment  MMR titers and TB Gold test ordered  Will fill out the paperwork once test results received.             An electronic signature was used to authenticate this note.    --Kateryna Nevarez MD     This note was generated completely or in part utilizing Dragon dictation speech recognition software.  Occasionally, words are mistranscribed and despite editing, the text may contain inaccuracies due to incorrect word recognition.  If further clarification is needed please contact the office at [phone]

## 2024-02-23 DIAGNOSIS — F90.2 ATTENTION DEFICIT HYPERACTIVITY DISORDER (ADHD), COMBINED TYPE: ICD-10-CM

## 2024-02-23 NOTE — TELEPHONE ENCOUNTER
Medication:   Requested Prescriptions     Pending Prescriptions Disp Refills    methylphenidate (RITALIN) 20 MG tablet 60 tablet 0     Sig: Take 1 tablet by mouth 2 times daily for 30 days. Max Daily Amount: 40 mg        Last Filled:  Patient Phone Number: [phone] (home)   12/25/2023  Last appt: 2/20/2024   Next appt: 4/26/2024    Last OARRS:        No data to display

## 2024-02-26 RX ORDER — METHYLPHENIDATE HYDROCHLORIDE 20 MG/1
20 TABLET ORAL 2 TIMES DAILY
Qty: 60 TABLET | Refills: 0 | OUTPATIENT
Start: 2024-02-26 | End: 2024-03-27

## 2024-04-02 ENCOUNTER — TELEMEDICINE (OUTPATIENT)
Dept: FAMILY MEDICINE CLINIC | Age: 29
End: 2024-04-02
Payer: COMMERCIAL

## 2024-04-02 DIAGNOSIS — F32.A ANXIETY AND DEPRESSION: Primary | ICD-10-CM

## 2024-04-02 DIAGNOSIS — F41.9 ANXIETY AND DEPRESSION: Primary | ICD-10-CM

## 2024-04-02 PROCEDURE — 99213 OFFICE O/P EST LOW 20 MIN: CPT | Performed by: FAMILY MEDICINE

## 2024-04-02 ASSESSMENT — ENCOUNTER SYMPTOMS
SINUS PRESSURE: 0
EYES NEGATIVE: 1
SHORTNESS OF BREATH: 0
EYE ITCHING: 0
COUGH: 0
ABDOMINAL PAIN: 0
EYE PAIN: 0
SORE THROAT: 0
WHEEZING: 0
RHINORRHEA: 0

## 2024-04-02 NOTE — PROGRESS NOTES
Sudha Cool (:  1995) is a Established patient, here for evaluation of the following:    Objective   Patient-Reported Vitals  Patient-Reported Weight: 193.0lb  Patient-Reported Height: 5'0\"       Physical Exam    Constitutional: [x] Appears well-developed and well-nourished [x] No apparent distress      [] Abnormal -     Mental status: [x] Alert and awake  [x] Oriented to person/place/time [x] Able to follow commands    [] Abnormal -            Psychiatric:       [x] Normal Affect [] Abnormal -        [x] No Hallucinations             Assessment & Plan   Below is the assessment and plan developed based on review of pertinent history, physical exam, labs, studies, and medications.  1. Anxiety and depression  Will refer to onsite psychologist and psych .         Subjective   HPI  29 yr old female with pmh of ADHD  seen virtually to evaluate for anxiety.  Pt is miserably crying during the visit stating she had a mental breakdown at work yesterday. She is very anxious, she works at a day care.  She states she is having a lot of anxiety, vistaril helps some with anxiety  She is on prozac 10 mg through her gyn, she had a serotonin  syndrome in past hence has been started on low dosage,  She is not suicidal but is stressed out, was seeing a therapist in past , not any more  Needing a work note off of work for today      Review of Systems   Constitutional: Negative.  Negative for fatigue.   HENT:  Negative for congestion, ear pain, rhinorrhea, sinus pressure and sore throat.    Eyes: Negative.  Negative for pain and itching.   Respiratory:  Negative for cough, shortness of breath and wheezing.    Cardiovascular:  Negative for chest pain and palpitations.   Gastrointestinal:  Negative for abdominal pain.   Genitourinary:  Negative for frequency and urgency.   Musculoskeletal:  Negative for gait problem.   Skin:  Negative for rash.   Neurological:  Negative for dizziness and headaches.   Psychiatric/Behavioral:

## 2024-04-18 ENCOUNTER — OFFICE VISIT (OUTPATIENT)
Age: 29
End: 2024-04-18
Payer: COMMERCIAL

## 2024-04-18 DIAGNOSIS — F41.8 DEPRESSION WITH ANXIETY: Primary | ICD-10-CM

## 2024-04-18 PROCEDURE — 90791 PSYCH DIAGNOSTIC EVALUATION: CPT | Performed by: PSYCHOLOGIST

## 2024-04-18 ASSESSMENT — PROMIS GLOBAL HEALTH SCALE
TO WHAT EXTENT ARE YOU ABLE TO CARRY OUT YOUR EVERYDAY PHYSICAL ACTIVITIES SUCH AS WALKING, CLIMBING STAIRS, CARRYING GROCERIES, OR MOVING A CHAIR [ON A SCALE OF 1 (NOT AT ALL) TO 5 (COMPLETELY)]?: MOSTLY
IN THE PAST 7 DAYS, HOW WOULD YOU RATE YOUR PAIN ON AVERAGE [ON A SCALE FROM 0 (NO PAIN) TO 10 (WORST IMAGINABLE PAIN)]?: 3
IN GENERAL, HOW WOULD YOU RATE YOUR MENTAL HEALTH, INCLUDING YOUR MOOD AND YOUR ABILITY TO THINK [ON A SCALE OF 1 (POOR) TO 5 (EXCELLENT)]?: FAIR
IN THE PAST 7 DAYS, HOW OFTEN HAVE YOU BEEN BOTHERED BY EMOTIONAL PROBLEMS, SUCH AS FEELING ANXIOUS, DEPRESSED, OR IRRITABLE [ON A SCALE FROM 1 (NEVER) TO 5 (ALWAYS)]?: SOMETIMES
IN THE PAST 7 DAYS, HOW WOULD YOU RATE YOUR FATIGUE ON AVERAGE [ON A SCALE FROM 1 (NONE) TO 5 (VERY SEVERE)]?: SEVERE
IN GENERAL, HOW WOULD YOU RATE YOUR PHYSICAL HEALTH [ON A SCALE OF 1 (POOR) TO 5 (EXCELLENT)]?: POOR
IN GENERAL, WOULD YOU SAY YOUR HEALTH IS...[ON A SCALE OF 1 (POOR) TO 5 (EXCELLENT)]: POOR
IN GENERAL, WOULD YOU SAY YOUR QUALITY OF LIFE IS...[ON A SCALE OF 1 (POOR) TO 5 (EXCELLENT)]: FAIR
IN GENERAL, HOW WOULD YOU RATE YOUR SATISFACTION WITH YOUR SOCIAL ACTIVITIES AND RELATIONSHIPS [ON A SCALE OF 1 (POOR) TO 5 (EXCELLENT)]?: GOOD
SUM OF RESPONSES TO QUESTIONS 3, 6, 7, & 8: 10
IN GENERAL, PLEASE RATE HOW WELL YOU CARRY OUT YOUR USUAL SOCIAL ACTIVITIES (INCLUDES ACTIVITIES AT HOME, AT WORK, AND IN YOUR COMMUNITY, AND RESPONSIBILITIES AS A PARENT, CHILD, SPOUSE, EMPLOYEE, FRIEND, ETC) [ON A SCALE OF 1 (POOR) TO 5 (EXCELLENT)]?: POOR
SUM OF RESPONSES TO QUESTIONS 2, 4, 5, & 10: 10

## 2024-04-18 NOTE — PROGRESS NOTES
Contact: normal  Speech: normal rate and volume, well-articulated  Mood:  pt very tearful  Affect: congruent  Perception: within normal limits  Thought Content: within normal limits  Thought Process: logical, coherent  Insight: good  Judgment: intact  Ability to understand instructions: Yes  Morbid Ideation: no   Suicide Assessment: no suicidal ideation, plan, or intent  Homicidal Ideation: no   Level of distress: High    4/18/24 PROMIS-10 Scores: Physical: 10 Mental: 10    PROMIS Raw Score   WNL Symptoms /Impairment     Mild Moderate Severe   Global Physical Health 15-20 13-14 9-12 4-8   Global Mental Health 14-20 11-13 7-10 4-6     Assessment and Plan  Depression with anxiety.  Patient reports chronic stressors which became exacerbated in September of last year.  The stressors include: Full-time employment, parenting 3 young children, health issues of both herself and her son, financial stressors.  She is actively working to address the stressors, has recently changed jobs and anticipates this will be a significant improvement.  However, she is acting out of the state of reactivity, and frequently feels overwhelmed.  Visit spent discussing both recent and longstanding events, patient is resilient and motivated to improve functioning.  She is in agreement to return to meet with [facility] for skills development, will also consider medication consult with Dr. Villasenor.    Strengths: Patient is  high functioning    Recommendations to Patient:   1.  Return to meet with [facility]    Recommendations to PCP:   1. Reinforce recommendations to pt.    Follow-up plan: To see in 2 weeks    Referrals: None      Belinda Tran, PhD    This note was generated completely or in part utilizing Dragon dictation speech recognition software.  Occasionally, words are mistranscribed and despite editing, the text may contain inaccuracies due to incorrect word recognition.  If further clarification is needed please contact the office at

## 2024-04-26 ENCOUNTER — OFFICE VISIT (OUTPATIENT)
Dept: FAMILY MEDICINE CLINIC | Age: 29
End: 2024-04-26
Payer: COMMERCIAL

## 2024-04-26 VITALS
TEMPERATURE: 97.7 F | BODY MASS INDEX: 36.42 KG/M2 | OXYGEN SATURATION: 99 % | SYSTOLIC BLOOD PRESSURE: 110 MMHG | DIASTOLIC BLOOD PRESSURE: 80 MMHG | HEART RATE: 88 BPM | HEIGHT: 60 IN | WEIGHT: 185.5 LBS

## 2024-04-26 DIAGNOSIS — F90.2 ATTENTION DEFICIT HYPERACTIVITY DISORDER (ADHD), COMBINED TYPE: ICD-10-CM

## 2024-04-26 DIAGNOSIS — F32.A ANXIETY AND DEPRESSION: Primary | ICD-10-CM

## 2024-04-26 DIAGNOSIS — F41.9 ANXIETY AND DEPRESSION: Primary | ICD-10-CM

## 2024-04-26 DIAGNOSIS — G43.909 MIGRAINE WITHOUT STATUS MIGRAINOSUS, NOT INTRACTABLE, UNSPECIFIED MIGRAINE TYPE: ICD-10-CM

## 2024-04-26 PROCEDURE — 99214 OFFICE O/P EST MOD 30 MIN: CPT | Performed by: FAMILY MEDICINE

## 2024-04-26 RX ORDER — METHYLPHENIDATE HYDROCHLORIDE 20 MG/1
20 TABLET ORAL 2 TIMES DAILY
Qty: 60 TABLET | Refills: 0 | Status: SHIPPED | OUTPATIENT
Start: 2024-04-26 | End: 2024-05-26

## 2024-04-26 RX ORDER — CYCLOBENZAPRINE HCL 5 MG
TABLET ORAL
COMMUNITY
Start: 2022-08-09

## 2024-04-26 ASSESSMENT — PATIENT HEALTH QUESTIONNAIRE - PHQ9
SUM OF ALL RESPONSES TO PHQ QUESTIONS 1-9: 3
2. FEELING DOWN, DEPRESSED OR HOPELESS: NOT AT ALL
9. THOUGHTS THAT YOU WOULD BE BETTER OFF DEAD, OR OF HURTING YOURSELF: NOT AT ALL
5. POOR APPETITE OR OVEREATING: NOT AT ALL
8. MOVING OR SPEAKING SO SLOWLY THAT OTHER PEOPLE COULD HAVE NOTICED. OR THE OPPOSITE, BEING SO FIGETY OR RESTLESS THAT YOU HAVE BEEN MOVING AROUND A LOT MORE THAN USUAL: NOT AT ALL
SUM OF ALL RESPONSES TO PHQ QUESTIONS 1-9: 3
3. TROUBLE FALLING OR STAYING ASLEEP: NOT AT ALL
1. LITTLE INTEREST OR PLEASURE IN DOING THINGS: NOT AT ALL
4. FEELING TIRED OR HAVING LITTLE ENERGY: SEVERAL DAYS
SUM OF ALL RESPONSES TO PHQ QUESTIONS 1-9: 3
6. FEELING BAD ABOUT YOURSELF - OR THAT YOU ARE A FAILURE OR HAVE LET YOURSELF OR YOUR FAMILY DOWN: NOT AT ALL
7. TROUBLE CONCENTRATING ON THINGS, SUCH AS READING THE NEWSPAPER OR WATCHING TELEVISION: MORE THAN HALF THE DAYS
SUM OF ALL RESPONSES TO PHQ QUESTIONS 1-9: 3
10. IF YOU CHECKED OFF ANY PROBLEMS, HOW DIFFICULT HAVE THESE PROBLEMS MADE IT FOR YOU TO DO YOUR WORK, TAKE CARE OF THINGS AT HOME, OR GET ALONG WITH OTHER PEOPLE: NOT DIFFICULT AT ALL
SUM OF ALL RESPONSES TO PHQ9 QUESTIONS 1 & 2: 0

## 2024-04-26 ASSESSMENT — ENCOUNTER SYMPTOMS
SINUS PRESSURE: 0
RHINORRHEA: 0
SORE THROAT: 0
COUGH: 0
EYE PAIN: 0
EYES NEGATIVE: 1
ABDOMINAL PAIN: 0
SHORTNESS OF BREATH: 0
WHEEZING: 0
EYE ITCHING: 0

## 2024-04-26 ASSESSMENT — ANXIETY QUESTIONNAIRES
6. BECOMING EASILY ANNOYED OR IRRITABLE: SEVERAL DAYS
2. NOT BEING ABLE TO STOP OR CONTROL WORRYING: SEVERAL DAYS
5. BEING SO RESTLESS THAT IT IS HARD TO SIT STILL: SEVERAL DAYS
IF YOU CHECKED OFF ANY PROBLEMS ON THIS QUESTIONNAIRE, HOW DIFFICULT HAVE THESE PROBLEMS MADE IT FOR YOU TO DO YOUR WORK, TAKE CARE OF THINGS AT HOME, OR GET ALONG WITH OTHER PEOPLE: NOT DIFFICULT AT ALL
3. WORRYING TOO MUCH ABOUT DIFFERENT THINGS: MORE THAN HALF THE DAYS
7. FEELING AFRAID AS IF SOMETHING AWFUL MIGHT HAPPEN: NOT AT ALL
4. TROUBLE RELAXING: SEVERAL DAYS
1. FEELING NERVOUS, ANXIOUS, OR ON EDGE: SEVERAL DAYS
GAD7 TOTAL SCORE: 7

## 2024-04-26 NOTE — PROGRESS NOTES
without status migrainosus, not intractable, unspecified migraine type-Per neurology      Follow-up 3 months           An electronic signature was used to authenticate this note.    --Kateryna Nevarez MD     This note was generated completely or in part utilizing Dragon dictation speech recognition software.  Occasionally, words are mistranscribed and despite editing, the text may contain inaccuracies due to incorrect word recognition.  If further clarification is needed please contact the office at [phone]

## 2024-07-06 ENCOUNTER — PATIENT MESSAGE (OUTPATIENT)
Dept: FAMILY MEDICINE CLINIC | Age: 29
End: 2024-07-06

## 2024-07-08 NOTE — TELEPHONE ENCOUNTER
From: Sudha Cool  To: Dr. Kateryna Nevarez  Sent: 7/6/2024 12:02 PM EDT  Subject: Reschedule    I have to reschedule our med admin visit. My company is paying for my certification course and had an opening the week of the 15th. It’s 8am-5pm the 15th-20th. Do you have a preference for when I reschedule our visit?   I’m sorry for the inconvenience!

## 2024-07-24 ENCOUNTER — OFFICE VISIT (OUTPATIENT)
Dept: FAMILY MEDICINE CLINIC | Age: 29
End: 2024-07-24
Payer: MEDICAID

## 2024-07-24 VITALS
HEIGHT: 60 IN | BODY MASS INDEX: 38.5 KG/M2 | DIASTOLIC BLOOD PRESSURE: 70 MMHG | OXYGEN SATURATION: 99 % | SYSTOLIC BLOOD PRESSURE: 130 MMHG | WEIGHT: 196.1 LBS | TEMPERATURE: 98.6 F | HEART RATE: 92 BPM

## 2024-07-24 DIAGNOSIS — F41.9 ANXIETY AND DEPRESSION: ICD-10-CM

## 2024-07-24 DIAGNOSIS — F90.2 ATTENTION DEFICIT HYPERACTIVITY DISORDER (ADHD), COMBINED TYPE: Primary | ICD-10-CM

## 2024-07-24 DIAGNOSIS — G43.909 MIGRAINE WITHOUT STATUS MIGRAINOSUS, NOT INTRACTABLE, UNSPECIFIED MIGRAINE TYPE: ICD-10-CM

## 2024-07-24 DIAGNOSIS — F32.A ANXIETY AND DEPRESSION: ICD-10-CM

## 2024-07-24 PROCEDURE — 99214 OFFICE O/P EST MOD 30 MIN: CPT | Performed by: FAMILY MEDICINE

## 2024-07-24 RX ORDER — METHYLPHENIDATE HYDROCHLORIDE 20 MG/1
20 TABLET ORAL 2 TIMES DAILY
Qty: 60 TABLET | Refills: 0 | Status: SHIPPED | OUTPATIENT
Start: 2024-07-24 | End: 2024-08-23

## 2024-07-24 ASSESSMENT — ENCOUNTER SYMPTOMS
SORE THROAT: 0
SHORTNESS OF BREATH: 0
EYE PAIN: 0
ABDOMINAL PAIN: 0
SINUS PRESSURE: 0
WHEEZING: 0
COUGH: 0
EYE ITCHING: 0
RHINORRHEA: 0
EYES NEGATIVE: 1

## 2024-07-24 NOTE — PROGRESS NOTES
Sudha Cool (:  1995) is a 29 y.o. female,Established patient, here for evaluation of the following chief complaint(s):  Medication Refill (Patient adrian like to have right knee looked at )          Subjective   SUBJECTIVE/OBJECTIVE:  HPI  29-year-old female patient here for follow-up  ADHD on Ritalin 20 mg twice daily, doing well no complaints.     Migraines patient is currently following with a neurologist and is on migraines on Nurtec.     Generalized anxiety-pt has stopped prozac completely , her anxiety is better . She is doing well.  Patient has a medical card and does marijuana Gummies to a marijuana clinic for her menstrual symptoms.    Review of Systems   Constitutional: Negative.  Negative for fatigue.   HENT:  Negative for congestion, ear pain, rhinorrhea, sinus pressure and sore throat.    Eyes: Negative.  Negative for pain and itching.   Respiratory:  Negative for cough, shortness of breath and wheezing.    Cardiovascular:  Negative for chest pain and palpitations.   Gastrointestinal:  Negative for abdominal pain.   Genitourinary:  Negative for frequency and urgency.   Musculoskeletal:  Negative for gait problem.   Skin:  Negative for rash.   Neurological:  Negative for dizziness and headaches.   Psychiatric/Behavioral:  The patient is not nervous/anxious.           Objective   Physical Exam  Constitutional:       Appearance: Normal appearance.   HENT:      Head: Normocephalic and atraumatic.      Right Ear: Tympanic membrane, ear canal and external ear normal.      Left Ear: Tympanic membrane, ear canal and external ear normal.      Nose: Nose normal. No congestion or rhinorrhea.      Mouth/Throat:      Mouth: Mucous membranes are moist.      Pharynx: Oropharynx is clear. No oropharyngeal exudate or posterior oropharyngeal erythema.   Eyes:      Extraocular Movements: Extraocular movements intact.      Conjunctiva/sclera: Conjunctivae normal.      Pupils: Pupils are equal, round, and reactive

## 2024-10-24 ENCOUNTER — OFFICE VISIT (OUTPATIENT)
Dept: FAMILY MEDICINE CLINIC | Age: 29
End: 2024-10-24
Payer: MEDICAID

## 2024-10-24 VITALS
BODY MASS INDEX: 38.87 KG/M2 | WEIGHT: 198 LBS | DIASTOLIC BLOOD PRESSURE: 70 MMHG | HEART RATE: 87 BPM | TEMPERATURE: 97.8 F | OXYGEN SATURATION: 98 % | SYSTOLIC BLOOD PRESSURE: 110 MMHG | HEIGHT: 60 IN

## 2024-10-24 DIAGNOSIS — F90.2 ATTENTION DEFICIT HYPERACTIVITY DISORDER (ADHD), COMBINED TYPE: ICD-10-CM

## 2024-10-24 DIAGNOSIS — G43.909 MIGRAINE WITHOUT STATUS MIGRAINOSUS, NOT INTRACTABLE, UNSPECIFIED MIGRAINE TYPE: Primary | ICD-10-CM

## 2024-10-24 DIAGNOSIS — F41.9 ANXIETY: ICD-10-CM

## 2024-10-24 PROCEDURE — 99214 OFFICE O/P EST MOD 30 MIN: CPT | Performed by: FAMILY MEDICINE

## 2024-10-24 RX ORDER — METHYLPHENIDATE HYDROCHLORIDE 20 MG/1
20 TABLET ORAL 2 TIMES DAILY
Qty: 60 TABLET | Refills: 0 | Status: SHIPPED | OUTPATIENT
Start: 2024-10-24 | End: 2024-11-23

## 2024-10-24 ASSESSMENT — ENCOUNTER SYMPTOMS
WHEEZING: 0
EYES NEGATIVE: 1
EYE PAIN: 0
SINUS PRESSURE: 0
EYE ITCHING: 0
SORE THROAT: 0
SHORTNESS OF BREATH: 0
COUGH: 0
ABDOMINAL PAIN: 0
RHINORRHEA: 0

## 2024-10-24 NOTE — PROGRESS NOTES
Sudha Cool (:  1995) is a 29 y.o. female,Established patient, here for evaluation of the following chief complaint(s):  Follow-up          Subjective   SUBJECTIVE/OBJECTIVE:  HPI  29-year-old female patient here for follow-up  ADHD on Ritalin 20 mg twice daily, doing well no complaints.needs refils     Migraines patient is currently following with a neurologist and is on migraines on Nurtec.     Generalized anxiety-pt has stopped prozac completely , her anxiety is better . She is doing well.  Patient has a medical card and does marijuana Gummies to a marijuana clinic for her menstrual symptoms.    Review of Systems   Constitutional: Negative.  Negative for fatigue.   HENT:  Negative for congestion, ear pain, rhinorrhea, sinus pressure and sore throat.    Eyes: Negative.  Negative for pain and itching.   Respiratory:  Negative for cough, shortness of breath and wheezing.    Cardiovascular:  Negative for chest pain and palpitations.   Gastrointestinal:  Negative for abdominal pain.   Genitourinary:  Negative for frequency and urgency.   Musculoskeletal:  Negative for gait problem.   Skin:  Negative for rash.   Neurological:  Negative for dizziness and headaches.   Psychiatric/Behavioral:  The patient is not nervous/anxious.           Objective   Physical Exam  Constitutional:       Appearance: Normal appearance.   HENT:      Head: Normocephalic and atraumatic.   Cardiovascular:      Rate and Rhythm: Normal rate and regular rhythm.   Pulmonary:      Effort: Pulmonary effort is normal.      Breath sounds: Normal breath sounds. No wheezing.   Neurological:      Mental Status: She is alert.   Psychiatric:         Mood and Affect: Mood normal.                  Assessment & Plan   ASSESSMENT/PLAN:  1. Migraine without status migrainosus, not intractable, unspecified migraine type  Imitrex and nurtec prn  2. Attention deficit hyperactivity disorder (ADHD), combined type  -     methylphenidate (RITALIN) 20 MG

## 2025-01-20 ENCOUNTER — OFFICE VISIT (OUTPATIENT)
Dept: FAMILY MEDICINE CLINIC | Age: 30
End: 2025-01-20
Payer: MEDICAID

## 2025-01-20 VITALS
HEIGHT: 60 IN | SYSTOLIC BLOOD PRESSURE: 110 MMHG | DIASTOLIC BLOOD PRESSURE: 80 MMHG | WEIGHT: 179 LBS | OXYGEN SATURATION: 99 % | BODY MASS INDEX: 35.14 KG/M2 | TEMPERATURE: 97.8 F | HEART RATE: 97 BPM

## 2025-01-20 DIAGNOSIS — F90.2 ATTENTION DEFICIT HYPERACTIVITY DISORDER (ADHD), COMBINED TYPE: ICD-10-CM

## 2025-01-20 PROCEDURE — 99213 OFFICE O/P EST LOW 20 MIN: CPT | Performed by: FAMILY MEDICINE

## 2025-01-20 RX ORDER — METHYLPHENIDATE HYDROCHLORIDE 20 MG/1
20 TABLET ORAL 2 TIMES DAILY
Qty: 60 TABLET | Refills: 0 | Status: SHIPPED | OUTPATIENT
Start: 2025-01-20 | End: 2025-02-19

## 2025-01-20 SDOH — ECONOMIC STABILITY: INCOME INSECURITY: IN THE LAST 12 MONTHS, WAS THERE A TIME WHEN YOU WERE NOT ABLE TO PAY THE MORTGAGE OR RENT ON TIME?: NO

## 2025-01-20 SDOH — ECONOMIC STABILITY: FOOD INSECURITY: WITHIN THE PAST 12 MONTHS, THE FOOD YOU BOUGHT JUST DIDN'T LAST AND YOU DIDN'T HAVE MONEY TO GET MORE.: SOMETIMES TRUE

## 2025-01-20 SDOH — ECONOMIC STABILITY: FOOD INSECURITY: WITHIN THE PAST 12 MONTHS, THE FOOD YOU BOUGHT JUST DIDN'T LAST AND YOU DIDN'T HAVE MONEY TO GET MORE.: NEVER TRUE

## 2025-01-20 SDOH — ECONOMIC STABILITY: FOOD INSECURITY: WITHIN THE PAST 12 MONTHS, YOU WORRIED THAT YOUR FOOD WOULD RUN OUT BEFORE YOU GOT MONEY TO BUY MORE.: SOMETIMES TRUE

## 2025-01-20 SDOH — ECONOMIC STABILITY: TRANSPORTATION INSECURITY
IN THE PAST 12 MONTHS, HAS LACK OF TRANSPORTATION KEPT YOU FROM MEETINGS, WORK, OR FROM GETTING THINGS NEEDED FOR DAILY LIVING?: YES

## 2025-01-20 ASSESSMENT — PATIENT HEALTH QUESTIONNAIRE - PHQ9
6. FEELING BAD ABOUT YOURSELF - OR THAT YOU ARE A FAILURE OR HAVE LET YOURSELF OR YOUR FAMILY DOWN: NOT AT ALL
2. FEELING DOWN, DEPRESSED OR HOPELESS: NOT AT ALL
SUM OF ALL RESPONSES TO PHQ9 QUESTIONS 1 & 2: 0
7. TROUBLE CONCENTRATING ON THINGS, SUCH AS READING THE NEWSPAPER OR WATCHING TELEVISION: NOT AT ALL
7. TROUBLE CONCENTRATING ON THINGS, SUCH AS READING THE NEWSPAPER OR WATCHING TELEVISION: NOT AT ALL
4. FEELING TIRED OR HAVING LITTLE ENERGY: NOT AT ALL
3. TROUBLE FALLING OR STAYING ASLEEP: NOT AT ALL
8. MOVING OR SPEAKING SO SLOWLY THAT OTHER PEOPLE COULD HAVE NOTICED. OR THE OPPOSITE - BEING SO FIDGETY OR RESTLESS THAT YOU HAVE BEEN MOVING AROUND A LOT MORE THAN USUAL: NOT AT ALL
SUM OF ALL RESPONSES TO PHQ QUESTIONS 1-9: 0
5. POOR APPETITE OR OVEREATING: NOT AT ALL
8. MOVING OR SPEAKING SO SLOWLY THAT OTHER PEOPLE COULD HAVE NOTICED. OR THE OPPOSITE, BEING SO FIGETY OR RESTLESS THAT YOU HAVE BEEN MOVING AROUND A LOT MORE THAN USUAL: NOT AT ALL
9. THOUGHTS THAT YOU WOULD BE BETTER OFF DEAD, OR OF HURTING YOURSELF: NOT AT ALL
SUM OF ALL RESPONSES TO PHQ QUESTIONS 1-9: 0
2. FEELING DOWN, DEPRESSED OR HOPELESS: NOT AT ALL
10. IF YOU CHECKED OFF ANY PROBLEMS, HOW DIFFICULT HAVE THESE PROBLEMS MADE IT FOR YOU TO DO YOUR WORK, TAKE CARE OF THINGS AT HOME, OR GET ALONG WITH OTHER PEOPLE: NOT DIFFICULT AT ALL
SUM OF ALL RESPONSES TO PHQ QUESTIONS 1-9: 0
1. LITTLE INTEREST OR PLEASURE IN DOING THINGS: NOT AT ALL
6. FEELING BAD ABOUT YOURSELF - OR THAT YOU ARE A FAILURE OR HAVE LET YOURSELF OR YOUR FAMILY DOWN: NOT AT ALL
5. POOR APPETITE OR OVEREATING: NOT AT ALL
10. IF YOU CHECKED OFF ANY PROBLEMS, HOW DIFFICULT HAVE THESE PROBLEMS MADE IT FOR YOU TO DO YOUR WORK, TAKE CARE OF THINGS AT HOME, OR GET ALONG WITH OTHER PEOPLE: NOT DIFFICULT AT ALL
9. THOUGHTS THAT YOU WOULD BE BETTER OFF DEAD, OR OF HURTING YOURSELF: NOT AT ALL
3. TROUBLE FALLING OR STAYING ASLEEP: NOT AT ALL
4. FEELING TIRED OR HAVING LITTLE ENERGY: NOT AT ALL
SUM OF ALL RESPONSES TO PHQ QUESTIONS 1-9: 0
1. LITTLE INTEREST OR PLEASURE IN DOING THINGS: NOT AT ALL
SUM OF ALL RESPONSES TO PHQ QUESTIONS 1-9: 0

## 2025-01-20 ASSESSMENT — ENCOUNTER SYMPTOMS
EYES NEGATIVE: 1
COUGH: 0
RHINORRHEA: 0
EYE ITCHING: 0
EYE PAIN: 0
SHORTNESS OF BREATH: 0
SORE THROAT: 0
WHEEZING: 0
SINUS PRESSURE: 0
ABDOMINAL PAIN: 0

## 2025-01-20 NOTE — PATIENT INSTRUCTIONS
[employer]   Speak to a trained professional 24/7 who can connect you to essential community services including food, clothing, transportation, housing, utilities, employment services, childcare, and baby supplies. 211 serves nationwide.  [facility] for resources in [place], [facility], [place] and [facility] in [place]; [place], [place], [place], and [facility] in [place].   [email or web address]/resources for resources in [place], [place], [place], [place], [place], [place], [place], [place], [facility], [place], [place], and [facility] in [place].    [employer]   What they offer: [employer] is a nationwide network of food perales, food pantries, and meal programs.  Website: https://www.feedingamerica.org/find-your-local-foodArizona Spine and Joint Hospital      National Hunger Hotline  What they offer: The hotline is a resource for individuals and families seeking information on how and where to obtain food.   Website:  https://www.hungerfreeamerica.org/en-us/[facility]-national-hunger-hotline    Hunger Hotline Phone Number: 5-735-9-HUNGPABLO (1-517.986.7344)  Hours of Operation: Monday - Friday 7am to 10pm   The Hunger Hotline also operates a texting service. Our number is 180-443-4584. Please note that these are automated texts and we do not reply or monitor texts.   [employer] [facility]  What they offer: $1 for $1 matching for families receiving SNAP/food stamps when spent on “healthy” food.  The match money can be used to purchase fruits and vegetables so adds more healthy food choices for SNAP beneficiaries.   Contact: https://[employer]/locations/           SNAP (formerly [employer])   What they offer: SNAP is used like cash to buy eligible food items from authorized retailers.  Apply for benefits online: https://jfs.[place].gov/ofam/foodstamps.stm     Apply for benefits by phone or in-person by visiting your local [employer] Family Services. Locate your county's Job and family services by searching the directory at

## 2025-01-20 NOTE — PROGRESS NOTES
Sudha Cool (:  1995) is a 29 y.o. female,Established patient, here for evaluation of the following chief complaint(s):  Follow-up          Subjective   SUBJECTIVE/OBJECTIVE:  HPI  29-year-old female patient here for follow-up  ADHD on Ritalin 20 mg twice daily, doing well no complaints.needs refils    Review of Systems   Constitutional: Negative.  Negative for fatigue.   HENT:  Negative for congestion, ear pain, rhinorrhea, sinus pressure and sore throat.    Eyes: Negative.  Negative for pain and itching.   Respiratory:  Negative for cough, shortness of breath and wheezing.    Cardiovascular:  Negative for chest pain and palpitations.   Gastrointestinal:  Negative for abdominal pain.   Genitourinary:  Negative for frequency and urgency.   Musculoskeletal:  Negative for gait problem.   Skin:  Negative for rash.   Neurological:  Negative for dizziness and headaches.   Psychiatric/Behavioral:  The patient is not nervous/anxious.           Objective   Physical Exam  Constitutional:       Appearance: Normal appearance.   HENT:      Head: Normocephalic and atraumatic.   Cardiovascular:      Rate and Rhythm: Normal rate and regular rhythm.   Pulmonary:      Effort: Pulmonary effort is normal.      Breath sounds: Normal breath sounds. No wheezing.   Neurological:      Mental Status: She is alert.   Psychiatric:         Mood and Affect: Mood normal.                  Assessment & Plan   ASSESSMENT/PLAN:  1. Attention deficit hyperactivity disorder (ADHD), combined type  PDMP reviewed  -     methylphenidate (RITALIN) 20 MG tablet; Take 1 tablet by mouth 2 times daily for 30 days. Max Daily Amount: 40 mg, Disp-60 tablet, R-0Normal  -     methylphenidate (RITALIN) 20 MG tablet; Take 1 tablet by mouth 2 times daily for 30 days. Max Daily Amount: 40 mg, Disp-60 tablet, R-0Normal  -     methylphenidate (RITALIN) 20 MG tablet; Take 1 tablet by mouth 2 times daily for 30 days. Max Daily Amount: 40 mg, Disp-60 tablet,

## 2025-03-06 ENCOUNTER — OFFICE VISIT (OUTPATIENT)
Dept: FAMILY MEDICINE CLINIC | Age: 30
End: 2025-03-06
Payer: MEDICAID

## 2025-03-06 VITALS
SYSTOLIC BLOOD PRESSURE: 105 MMHG | OXYGEN SATURATION: 99 % | DIASTOLIC BLOOD PRESSURE: 71 MMHG | HEIGHT: 60 IN | WEIGHT: 171.2 LBS | HEART RATE: 83 BPM | BODY MASS INDEX: 33.61 KG/M2 | TEMPERATURE: 97.8 F

## 2025-03-06 DIAGNOSIS — R68.89 FLU-LIKE SYMPTOMS: Primary | ICD-10-CM

## 2025-03-06 DIAGNOSIS — H10.022 PINK EYE DISEASE OF LEFT EYE: ICD-10-CM

## 2025-03-06 PROCEDURE — 99213 OFFICE O/P EST LOW 20 MIN: CPT | Performed by: FAMILY MEDICINE

## 2025-03-06 RX ORDER — TOBRAMYCIN 3 MG/ML
1 SOLUTION/ DROPS OPHTHALMIC 3 TIMES DAILY PRN
Qty: 5 ML | Refills: 0 | Status: SHIPPED | OUTPATIENT
Start: 2025-03-06 | End: 2025-03-11

## 2025-03-06 RX ORDER — BROMPHENIRAMINE MALEATE, PSEUDOEPHEDRINE HYDROCHLORIDE, AND DEXTROMETHORPHAN HYDROBROMIDE 2; 30; 10 MG/5ML; MG/5ML; MG/5ML
5 SYRUP ORAL 3 TIMES DAILY PRN
Qty: 105 ML | Refills: 0 | Status: SHIPPED | OUTPATIENT
Start: 2025-03-06 | End: 2025-03-13

## 2025-03-06 ASSESSMENT — ENCOUNTER SYMPTOMS
EYES NEGATIVE: 1
EYE ITCHING: 0
SINUS PRESSURE: 0
COUGH: 1
SHORTNESS OF BREATH: 0
ABDOMINAL PAIN: 0
RHINORRHEA: 0
SORE THROAT: 0
WHEEZING: 0
EYE PAIN: 0

## 2025-03-06 NOTE — PROGRESS NOTES
Sudha Cool (:  1995) is a 30 y.o. female,Established patient, here for evaluation of the following chief complaint(s):  Cough and Congestion (Since monday)          Subjective   SUBJECTIVE/OBJECTIVE:  HPI  30-year-old female patient here for acute visit.  Patient says she has been feeling sick for x 4 days.  Patient complaining of congestion and cough.  No fever, started with bodyaches yesterday.  She also has left eyelid redness today with some minimal discharge.    Review of Systems   Constitutional:  Positive for fatigue.   HENT:  Positive for congestion. Negative for ear pain, rhinorrhea, sinus pressure and sore throat.    Eyes: Negative.  Negative for pain and itching.   Respiratory:  Positive for cough. Negative for shortness of breath and wheezing.    Cardiovascular:  Negative for chest pain and palpitations.   Gastrointestinal:  Negative for abdominal pain.   Genitourinary:  Negative for frequency and urgency.   Musculoskeletal:  Negative for gait problem.   Skin:  Negative for rash.   Neurological:  Negative for dizziness and headaches.   Psychiatric/Behavioral:  The patient is not nervous/anxious.           Objective   Physical Exam  Constitutional:       Appearance: Normal appearance.   HENT:      Head: Normocephalic and atraumatic.   Cardiovascular:      Rate and Rhythm: Normal rate and regular rhythm.   Pulmonary:      Effort: Pulmonary effort is normal.      Breath sounds: Normal breath sounds. No wheezing.   Neurological:      Mental Status: She is alert.   Psychiatric:         Mood and Affect: Mood normal.         Flu swab neg         Assessment & Plan   ASSESSMENT/PLAN:  1. Flu-like symptoms  -     brompheniramine-pseudoephedrine-DM 2-30-10 MG/5ML syrup; Take 5 mLs by mouth 3 times daily as needed for Congestion or Cough, Disp-105 mL, R-0Normal  2. Pink eye disease of left eye    Tobramycin eye drops            An electronic signature was used to authenticate this note.    --Kateryna

## 2025-04-21 ENCOUNTER — OFFICE VISIT (OUTPATIENT)
Dept: FAMILY MEDICINE CLINIC | Age: 30
End: 2025-04-21
Payer: MEDICAID

## 2025-04-21 VITALS
TEMPERATURE: 97.8 F | BODY MASS INDEX: 33.38 KG/M2 | DIASTOLIC BLOOD PRESSURE: 60 MMHG | OXYGEN SATURATION: 97 % | SYSTOLIC BLOOD PRESSURE: 110 MMHG | HEART RATE: 72 BPM | HEIGHT: 60 IN | WEIGHT: 170 LBS

## 2025-04-21 DIAGNOSIS — F90.2 ATTENTION DEFICIT HYPERACTIVITY DISORDER (ADHD), COMBINED TYPE: ICD-10-CM

## 2025-04-21 PROCEDURE — 99213 OFFICE O/P EST LOW 20 MIN: CPT | Performed by: FAMILY MEDICINE

## 2025-04-21 RX ORDER — METHYLPHENIDATE HYDROCHLORIDE 20 MG/1
20 TABLET ORAL 2 TIMES DAILY
Qty: 60 TABLET | Refills: 0 | Status: SHIPPED | OUTPATIENT
Start: 2025-04-21 | End: 2025-05-21

## 2025-04-21 RX ORDER — METHYLPHENIDATE HYDROCHLORIDE 20 MG/1
20 TABLET ORAL 2 TIMES DAILY
Qty: 60 TABLET | Refills: 0 | Status: SHIPPED | OUTPATIENT
Start: 2025-05-21 | End: 2025-06-20

## 2025-04-21 RX ORDER — METHYLPHENIDATE HYDROCHLORIDE 20 MG/1
20 TABLET ORAL 2 TIMES DAILY
Qty: 60 TABLET | Refills: 0 | Status: SHIPPED | OUTPATIENT
Start: 2025-06-21 | End: 2025-07-21

## 2025-04-21 NOTE — PROGRESS NOTES
Sudha Cool (:  1995) is a 30 y.o. female,Established patient, here for evaluation of the following chief complaint(s):  Follow-up          Subjective   SUBJECTIVE/OBJECTIVE:  HPI  29-year-old female patient here for follow-up  ADHD on Ritalin 20 mg twice daily, doing well no complaints.needs refils    Review of Systems       Objective   Physical Exam  Constitutional:       Appearance: Normal appearance.   HENT:      Head: Normocephalic and atraumatic.   Cardiovascular:      Rate and Rhythm: Normal rate and regular rhythm.   Pulmonary:      Effort: Pulmonary effort is normal.      Breath sounds: Normal breath sounds. No wheezing.   Neurological:      Mental Status: She is alert.   Psychiatric:         Mood and Affect: Mood normal.                  Assessment & Plan   ASSESSMENT/PLAN:  1. Attention deficit hyperactivity disorder (ADHD), combined type  -     methylphenidate (RITALIN) 20 MG tablet; Take 1 tablet by mouth 2 times daily for 30 days. Max Daily Amount: 40 mg, Disp-60 tablet, R-0Normal  -     methylphenidate (RITALIN) 20 MG tablet; Take 1 tablet by mouth 2 times daily for 30 days. Max Daily Amount: 40 mg, Disp-60 tablet, R-0Normal  -     methylphenidate (RITALIN) 20 MG tablet; Take 1 tablet by mouth 2 times daily for 30 days. Max Daily Amount: 40 mg, Disp-60 tablet, R-0Normal  PDMP reviewed  F/u 3 months           An electronic signature was used to authenticate this note.    --Kateryna Nevarez MD     This note was generated completely or in part utilizing Dragon dictation speech recognition software.  Occasionally, words are mistranscribed and despite editing, the text may contain inaccuracies due to incorrect word recognition.  If further clarification is needed please contact the office at [phone]

## 2025-05-28 ENCOUNTER — PATIENT MESSAGE (OUTPATIENT)
Dept: FAMILY MEDICINE CLINIC | Age: 30
End: 2025-05-28

## 2025-05-28 ENCOUNTER — OFFICE VISIT (OUTPATIENT)
Dept: FAMILY MEDICINE CLINIC | Age: 30
End: 2025-05-28
Payer: MEDICAID

## 2025-05-28 VITALS
TEMPERATURE: 97.2 F | OXYGEN SATURATION: 99 % | HEART RATE: 93 BPM | SYSTOLIC BLOOD PRESSURE: 108 MMHG | HEIGHT: 60 IN | BODY MASS INDEX: 30.63 KG/M2 | DIASTOLIC BLOOD PRESSURE: 68 MMHG | WEIGHT: 156 LBS

## 2025-05-28 DIAGNOSIS — Z00.00 ANNUAL PHYSICAL EXAM: ICD-10-CM

## 2025-05-28 DIAGNOSIS — Z00.00 ANNUAL PHYSICAL EXAM: Primary | ICD-10-CM

## 2025-05-28 DIAGNOSIS — D22.9 NUMEROUS MOLES: ICD-10-CM

## 2025-05-28 PROCEDURE — 99395 PREV VISIT EST AGE 18-39: CPT | Performed by: FAMILY MEDICINE

## 2025-05-28 RX ORDER — CYANOCOBALAMIN 1000 UG/ML
INJECTION, SOLUTION INTRAMUSCULAR; SUBCUTANEOUS
COMMUNITY

## 2025-05-28 ASSESSMENT — ENCOUNTER SYMPTOMS
EYE PAIN: 0
SHORTNESS OF BREATH: 0
EYES NEGATIVE: 1
SINUS PRESSURE: 0
ABDOMINAL PAIN: 0
SORE THROAT: 0
EYE ITCHING: 0
RHINORRHEA: 0
COUGH: 0
WHEEZING: 0

## 2025-05-28 NOTE — PROGRESS NOTES
Sudha Cool is a 30 y.o. year old female here for:    Chief Complaint:    Chief Complaint   Patient presents with    Weight Loss     Stated that she has noticed some bruising and that she noticed some changes in moles on her body. Requesting needed labs.        Subjective:    Today, her current concerns include:    Multiple bruises in last 2-3 weeks  Numerous moles  Preventive Services:  Pap with gyn  Health Maintenance History:  Patient exercises regularly? yes  Diet? ok  Glasses/Eyes: Does not wear prescription glasses or lenses      Other Health Maintenance History:  Sexually active? yes  In the past two weeks have they been bothered by feeling \"down\", depressed or hopeless?  no  In the past two weeks, have they experienced a loss of interest or pleasure in doing things?  no  In the last year, have you fallen more than once or been seriously injured in a fall?  no  Advance Directives no        Functional Assessment  1.  Do you have problems with hearing?  no  2.  Do you have problems with vision?  no  Past Medical History:   Diagnosis Date    ADHD (attention deficit hyperactivity disorder)     Anxiety     Atypical migraine     Depression     Gestational diabetes     H/O drug abuse (HCC)     hx of heroin/cocaine    IBS (irritable bowel syndrome)     Levoscoliosis     Obese     PCOS (polycystic ovarian syndrome)      Social History     Tobacco Use    Smoking status: Never    Smokeless tobacco: Never   Vaping Use    Vaping status: Never Used   Substance Use Topics    Alcohol use: Not Currently    Drug use: Not Currently     Comment: heroin-snort 5 years ago     Family History   Problem Relation Age of Onset    Allergy (Severe) Father     Depression Sister     Depression Sister     Miscarriages / Stillbirths Maternal Aunt     Obesity Maternal Aunt     Liver Disease Maternal Aunt     Kidney Disease Maternal Aunt         Stage 3B kidney failure    Bleeding Prob Paternal Aunt         Atif morrison?    Heart Disease

## 2025-05-29 ENCOUNTER — RESULTS FOLLOW-UP (OUTPATIENT)
Dept: FAMILY MEDICINE CLINIC | Age: 30
End: 2025-05-29

## 2025-05-29 LAB
ALBUMIN SERPL-MCNC: 4.8 G/DL (ref 3.4–5)
ALBUMIN/GLOB SERPL: 1.6 {RATIO} (ref 1.1–2.2)
ALP SERPL-CCNC: 82 U/L (ref 40–129)
ALT SERPL-CCNC: 18 U/L (ref 10–40)
ANION GAP SERPL CALCULATED.3IONS-SCNC: 11 MMOL/L (ref 3–16)
AST SERPL-CCNC: 19 U/L (ref 15–37)
BASOPHILS # BLD: 0 K/UL (ref 0–0.2)
BASOPHILS NFR BLD: 0.2 %
BILIRUB SERPL-MCNC: 0.4 MG/DL (ref 0–1)
BUN SERPL-MCNC: 5 MG/DL (ref 7–20)
CALCIUM SERPL-MCNC: 9.9 MG/DL (ref 8.3–10.6)
CHLORIDE SERPL-SCNC: 102 MMOL/L (ref 99–110)
CHOLEST SERPL-MCNC: 144 MG/DL (ref 0–199)
CO2 SERPL-SCNC: 26 MMOL/L (ref 21–32)
CREAT SERPL-MCNC: 0.6 MG/DL (ref 0.6–1.1)
DEPRECATED RDW RBC AUTO: 13.4 % (ref 12.4–15.4)
EOSINOPHIL # BLD: 0.1 K/UL (ref 0–0.6)
EOSINOPHIL NFR BLD: 1.1 %
GFR SERPLBLD CREATININE-BSD FMLA CKD-EPI: >90 ML/MIN/{1.73_M2}
GLUCOSE SERPL-MCNC: 85 MG/DL (ref 70–99)
HCT VFR BLD AUTO: 39.3 % (ref 36–48)
HDLC SERPL-MCNC: 55 MG/DL (ref 40–60)
HGB BLD-MCNC: 13.4 G/DL (ref 12–16)
LDLC SERPL CALC-MCNC: 77 MG/DL
LYMPHOCYTES # BLD: 2.5 K/UL (ref 1–5.1)
LYMPHOCYTES NFR BLD: 40.1 %
MCH RBC QN AUTO: 30.4 PG (ref 26–34)
MCHC RBC AUTO-ENTMCNC: 34.1 G/DL (ref 31–36)
MCV RBC AUTO: 89.3 FL (ref 80–100)
MONOCYTES # BLD: 0.6 K/UL (ref 0–1.3)
MONOCYTES NFR BLD: 9.6 %
NEUTROPHILS # BLD: 3 K/UL (ref 1.7–7.7)
NEUTROPHILS NFR BLD: 49 %
PLATELET # BLD AUTO: 235 K/UL (ref 135–450)
PMV BLD AUTO: 10.8 FL (ref 5–10.5)
POTASSIUM SERPL-SCNC: 4.3 MMOL/L (ref 3.5–5.1)
PROT SERPL-MCNC: 7.8 G/DL (ref 6.4–8.2)
RBC # BLD AUTO: 4.4 M/UL (ref 4–5.2)
SODIUM SERPL-SCNC: 139 MMOL/L (ref 136–145)
TRIGL SERPL-MCNC: 58 MG/DL (ref 0–150)
TSH SERPL DL<=0.005 MIU/L-ACNC: 1.85 UIU/ML (ref 0.27–4.2)
VLDLC SERPL CALC-MCNC: 12 MG/DL
WBC # BLD AUTO: 6.2 K/UL (ref 4–11)

## 2025-06-04 NOTE — TELEPHONE ENCOUNTER
MA placed call to [phone] (home)  spoke with pt and she stated that she is okay to wait to discuss at her up coming appt on 7/17/2025.

## 2025-07-17 ENCOUNTER — PATIENT MESSAGE (OUTPATIENT)
Dept: FAMILY MEDICINE CLINIC | Age: 30
End: 2025-07-17

## 2025-07-21 ENCOUNTER — OFFICE VISIT (OUTPATIENT)
Dept: FAMILY MEDICINE CLINIC | Age: 30
End: 2025-07-21
Payer: MEDICAID

## 2025-07-21 VITALS
HEIGHT: 60 IN | OXYGEN SATURATION: 98 % | TEMPERATURE: 97.8 F | SYSTOLIC BLOOD PRESSURE: 100 MMHG | DIASTOLIC BLOOD PRESSURE: 60 MMHG | HEART RATE: 86 BPM | BODY MASS INDEX: 30.82 KG/M2 | WEIGHT: 157 LBS

## 2025-07-21 DIAGNOSIS — F90.2 ATTENTION DEFICIT HYPERACTIVITY DISORDER (ADHD), COMBINED TYPE: ICD-10-CM

## 2025-07-21 PROCEDURE — 99213 OFFICE O/P EST LOW 20 MIN: CPT | Performed by: FAMILY MEDICINE

## 2025-07-21 RX ORDER — METHYLPHENIDATE HYDROCHLORIDE 20 MG/1
20 TABLET ORAL 2 TIMES DAILY
Qty: 60 TABLET | Refills: 0 | Status: SHIPPED | OUTPATIENT
Start: 2025-08-21 | End: 2025-09-20

## 2025-07-21 RX ORDER — METHYLPHENIDATE HYDROCHLORIDE 20 MG/1
20 TABLET ORAL 2 TIMES DAILY
Qty: 60 TABLET | Refills: 0 | Status: SHIPPED | OUTPATIENT
Start: 2025-09-21 | End: 2025-10-21

## 2025-07-21 RX ORDER — METHYLPHENIDATE HYDROCHLORIDE 20 MG/1
20 TABLET ORAL 2 TIMES DAILY
Qty: 60 TABLET | Refills: 0 | Status: SHIPPED | OUTPATIENT
Start: 2025-07-21 | End: 2025-08-20

## 2025-07-21 ASSESSMENT — ENCOUNTER SYMPTOMS
EYE PAIN: 0
EYE ITCHING: 0
RHINORRHEA: 0
ABDOMINAL PAIN: 0
SORE THROAT: 0
SHORTNESS OF BREATH: 0
EYES NEGATIVE: 1
WHEEZING: 0
COUGH: 0
SINUS PRESSURE: 0

## 2025-07-21 NOTE — PROGRESS NOTES
Sudha Cool (:  1995) is a 30 y.o. female,Established patient, here for evaluation of the following chief complaint(s):  Medication Check          Subjective   SUBJECTIVE/OBJECTIVE:  HPI  29-year-old female patient here for follow-up  ADHD on Ritalin 20 mg twice daily, doing well no complaints.needs refils       Review of Systems   Constitutional: Negative.  Negative for fatigue.   HENT:  Negative for congestion, ear pain, rhinorrhea, sinus pressure and sore throat.    Eyes: Negative.  Negative for pain and itching.   Respiratory:  Negative for cough, shortness of breath and wheezing.    Cardiovascular:  Negative for chest pain and palpitations.   Gastrointestinal:  Negative for abdominal pain.   Genitourinary:  Negative for frequency and urgency.   Musculoskeletal:  Negative for gait problem.   Skin:  Negative for rash.   Neurological:  Negative for dizziness and headaches.   Psychiatric/Behavioral:  The patient is not nervous/anxious.           Objective   Physical Exam  Constitutional:       Appearance: Normal appearance.   HENT:      Head: Normocephalic and atraumatic.   Cardiovascular:      Rate and Rhythm: Normal rate and regular rhythm.   Pulmonary:      Effort: Pulmonary effort is normal.      Breath sounds: Normal breath sounds. No wheezing.   Neurological:      Mental Status: She is alert.   Psychiatric:         Mood and Affect: Mood normal.                Assessment & Plan   ASSESSMENT/PLAN:  1. Attention deficit hyperactivity disorder (ADHD), combined type  The following orders have not been finalized:  -     methylphenidate (RITALIN) 20 MG tablet  -     methylphenidate (RITALIN) 20 MG tablet  -     methylphenidate (RITALIN) 20 MG tablet    F/u 3 months         An electronic signature was used to authenticate this note.    --Kateryna Nevarez MD     This note was generated completely or in part utilizing Dragon dictation speech recognition software.  Occasionally, words are mistranscribed and

## 2025-09-05 ENCOUNTER — OFFICE VISIT (OUTPATIENT)
Dept: FAMILY MEDICINE CLINIC | Age: 30
End: 2025-09-05
Payer: MEDICAID

## 2025-09-05 VITALS
OXYGEN SATURATION: 99 % | TEMPERATURE: 98 F | WEIGHT: 140.4 LBS | HEIGHT: 60 IN | SYSTOLIC BLOOD PRESSURE: 119 MMHG | DIASTOLIC BLOOD PRESSURE: 75 MMHG | HEART RATE: 91 BPM | BODY MASS INDEX: 27.56 KG/M2

## 2025-09-05 DIAGNOSIS — J01.10 ACUTE NON-RECURRENT FRONTAL SINUSITIS: ICD-10-CM

## 2025-09-05 DIAGNOSIS — J40 BRONCHITIS: Primary | ICD-10-CM

## 2025-09-05 PROCEDURE — 99213 OFFICE O/P EST LOW 20 MIN: CPT | Performed by: FAMILY MEDICINE

## 2025-09-05 RX ORDER — BROMPHENIRAMINE MALEATE, PSEUDOEPHEDRINE HYDROCHLORIDE, AND DEXTROMETHORPHAN HYDROBROMIDE 2; 30; 10 MG/5ML; MG/5ML; MG/5ML
5 SYRUP ORAL 3 TIMES DAILY PRN
Qty: 120 ML | Refills: 0 | Status: SHIPPED | OUTPATIENT
Start: 2025-09-05 | End: 2025-09-13

## 2025-09-05 RX ORDER — ALBUTEROL SULFATE 90 UG/1
2 INHALANT RESPIRATORY (INHALATION) 4 TIMES DAILY PRN
Qty: 18 G | Refills: 0 | Status: SHIPPED | OUTPATIENT
Start: 2025-09-05

## 2025-09-05 RX ORDER — FLUCONAZOLE 150 MG/1
150 TABLET ORAL
Qty: 2 TABLET | Refills: 0 | Status: SHIPPED | OUTPATIENT
Start: 2025-09-05 | End: 2025-09-11

## 2025-09-05 RX ORDER — AZITHROMYCIN 250 MG/1
TABLET, FILM COATED ORAL
Qty: 6 TABLET | Refills: 0 | Status: SHIPPED | OUTPATIENT
Start: 2025-09-05 | End: 2025-09-15

## (undated) DEVICE — SURGICAL SET UP - SURE SET: Brand: MEDLINE INDUSTRIES, INC.

## (undated) DEVICE — BLADE 1882040HR 5PK M4 INF TURB 2MM ROT: Brand: STRAIGHTSHOT

## (undated) DEVICE — SHEATH 1912000 5PK 4MM/0DEG STORZ XOMED: Brand: ENDO-SCRUB®

## (undated) DEVICE — SPLINT NSL W0.6XL2IN INTNSL CHITOSAN POLYMER HYDRATED

## (undated) DEVICE — SPLINT 1524055 DOYLE II AIRWAY SET: Brand: DOYLE II ™

## (undated) DEVICE — TUBE SUCT LAPSCP

## (undated) DEVICE — SUTURE PROL SZ 3-0 L30IN NONABSORBABLE BLU L60MM KS STR REV 8622H

## (undated) DEVICE — TUBING, SUCTION, 1/4" X 12', STRAIGHT: Brand: MEDLINE

## (undated) DEVICE — SURE SET-DOUBLE BASIN-LF: Brand: MEDLINE INDUSTRIES, INC.

## (undated) DEVICE — DRAPE,INSTRUMENT,MAGNETIC,10X16: Brand: MEDLINE

## (undated) DEVICE — SUTURE CHROMIC GUT SZ 4-0 L18IN ABSRB BRN L13MM P-3 3/8 CIR 1654G

## (undated) DEVICE — STANDARD HYPODERMIC NEEDLE,POLYPROPYLENE HUB: Brand: MONOJECT

## (undated) DEVICE — GLOVE ORANGE PI 7 1/2   MSG9075

## (undated) DEVICE — SOLUTION IV 1000ML 0.9% SOD CHL

## (undated) DEVICE — JEWISH HOSPITAL TURNOVER KIT: Brand: MEDLINE INDUSTRIES, INC.

## (undated) DEVICE — BLADE 1884004 TRICUT 5PK 4MM: Brand: TRICUT®

## (undated) DEVICE — SPONGE,NEURO,1"X3",XR,STRL,LF,10/PK: Brand: MEDLINE

## (undated) DEVICE — PACK,EENT,TURBAN DRAPE,PK II: Brand: MEDLINE

## (undated) DEVICE — GARMENT,MEDLINE,DVT,INT,CALF,MED, GEN2: Brand: MEDLINE